# Patient Record
Sex: MALE | Race: WHITE | NOT HISPANIC OR LATINO | Employment: FULL TIME | URBAN - METROPOLITAN AREA
[De-identification: names, ages, dates, MRNs, and addresses within clinical notes are randomized per-mention and may not be internally consistent; named-entity substitution may affect disease eponyms.]

---

## 2019-10-03 ENCOUNTER — HOSPITAL ENCOUNTER (EMERGENCY)
Facility: HOSPITAL | Age: 54
Discharge: HOME | End: 2019-10-03
Attending: EMERGENCY MEDICINE
Payer: OTHER GOVERNMENT

## 2019-10-03 VITALS
DIASTOLIC BLOOD PRESSURE: 84 MMHG | OXYGEN SATURATION: 100 % | HEART RATE: 77 BPM | WEIGHT: 185 LBS | SYSTOLIC BLOOD PRESSURE: 125 MMHG | RESPIRATION RATE: 14 BRPM | TEMPERATURE: 98 F

## 2019-10-03 DIAGNOSIS — S93.401A SPRAIN OF RIGHT ANKLE, UNSPECIFIED LIGAMENT, INITIAL ENCOUNTER: Primary | ICD-10-CM

## 2019-10-03 DIAGNOSIS — M25.522 LEFT ELBOW PAIN: ICD-10-CM
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
## 2019-10-03 PROCEDURE — 99284 EMERGENCY DEPT VISIT MOD MDM: CPT | Mod: ,,, | Performed by: PHYSICIAN ASSISTANT

## 2019-10-03 PROCEDURE — 99284 PR EMERGENCY DEPT VISIT,LEVEL IV: ICD-10-PCS | Mod: ,,, | Performed by: PHYSICIAN ASSISTANT

## 2019-10-03 PROCEDURE — 99284 EMERGENCY DEPT VISIT MOD MDM: CPT | Mod: 25

## 2019-10-03 PROCEDURE — 29515 APPLICATION SHORT LEG SPLINT: CPT | Mod: RT

## 2019-10-03 RX ORDER — TELMISARTAN 80 MG/1
80 TABLET ORAL DAILY
COMMUNITY
End: 2022-01-14 | Stop reason: SDUPTHER

## 2019-10-03 RX ORDER — DEXTROAMPHETAMINE SACCHARATE, AMPHETAMINE ASPARTATE MONOHYDRATE, DEXTROAMPHETAMINE SULFATE AND AMPHETAMINE SULFATE 5; 5; 5; 5 MG/1; MG/1; MG/1; MG/1
20 CAPSULE, EXTENDED RELEASE ORAL
COMMUNITY
End: 2022-06-15 | Stop reason: SDUPTHER

## 2019-10-03 RX ORDER — AMLODIPINE BESYLATE 10 MG/1
10 TABLET ORAL DAILY
Status: ON HOLD | COMMUNITY
End: 2021-11-23 | Stop reason: HOSPADM

## 2019-10-04 NOTE — ED NOTES
Patient identifiers for Brice Byrd 54 y.o. male checked and correct.  Chief Complaint   Patient presents with    Fall     Pt with right ankle and left elbow pain s/p trip and fall yesterday.  Pt denies hitting head, no LOC     No past medical history on file.  Allergies reported: Review of patient's allergies indicates:  No Known Allergies      LOC: Patient is awake, alert, and aware of environment with an appropriate affect. Patient is oriented x 4 and speaking appropriately.  APPEARANCE: Patient resting comfortably and in no acute distress. Patient is clean and well groomed, patient's clothing is properly fastened.  SKIN: The skin is warm and dry. Patient has normal skin turgor and moist mucus membranes. Skin abrasion to left elbow.   MUSKULOSKELETAL: Patient is moving all extremities well, no obvious deformities noted. Pulses intact. Patient reports stepping wrong and falling yesterday after twisting his right ankle; states he heard a snap. Reports pain 8/10. Swelling noted to ankle and bruising to sides of foot. Patient also hit his left elbow when he fell; reports tenderness. Also reports tenderness to left hip and ankle as well.   RESPIRATORY: Airway is open and patent. Respirations are spontaneous and non-labored with normal effort and rate. Denies SOB.  CARDIAC: Patient has a normal rate and rhythm. No peripheral edema noted. Denies chest pain.  ABDOMEN: No distention noted.  Soft and non-tender upon palpation. Denies nausea and vomiting.  NEUROLOGICAL: PERRL. Facial expression is symmetrical. Hand grasps are equal bilaterally. Normal sensation in all extremities when touched with finger. Denies headache, feeling light-headed, dizzy. Denies hitting his head with fall.

## 2019-10-04 NOTE — DISCHARGE INSTRUCTIONS
Use Ice for ankle and elbow.  Weight-bearing as tolerated with splint to right ankle.  Follow-up with primary provider as needed for repeat films if pain continues.  Keep ankle elevated.  Do not run until ankles completely healed.  Return to the emergency department immediately if he develops significant swelling, fever or new numbness.    Our goal in the emergency department is to always give you outstanding care and exceptional service. You may receive a survey by mail or e-mail in the next week regarding your experience in our ED. We would greatly appreciate your completing and returning the survey. Your feedback provides us with a way to recognize our staff who give very good care and it helps us learn how to improve when your experience was below our aspiration of excellence.

## 2019-10-04 NOTE — ED PROVIDER NOTES
Encounter Date: 10/3/2019       History     Chief Complaint   Patient presents with    Fall     Pt with right ankle and left elbow pain s/p trip and fall yesterday.  Pt denies hitting head, no LOC     Mr Byrd is a 54yoM who presents for right ankle left elbow pain after fall; pertinent PMHx HTN.  Patient was running yesterday and tripped, causing forced inversion injury to right ankle.  Also landed on left elbow.  Endorses worsening pain to right ankle and left elbow today.  He is able to ambulate though limps due to pain. He Is taking Tylenol for his pain. Denies history of right ankle surgery or prior injury. He did not injure his head or neck.  Denies distal numbness or weakness, other musculoskeletal injury, fever, nausea or vomiting.  The patients available PMH, PSH, Social History, medications, allergies, and triage vital signs were reviewed just prior to their medical evaluation.    Please be advised this text was dictated with Twinklr software and may contain errors due to translation.           Review of patient's allergies indicates:  No Known Allergies  Past Medical History:   Diagnosis Date    Hypertension      History reviewed. No pertinent surgical history.  History reviewed. No pertinent family history.  Social History     Tobacco Use    Smoking status: Former Smoker    Smokeless tobacco: Never Used   Substance Use Topics    Alcohol use: Never     Frequency: Never    Drug use: Never     Review of Systems   Constitutional: Negative for chills and fever.   Gastrointestinal: Negative for nausea and vomiting.   Musculoskeletal: Positive for arthralgias, gait problem and joint swelling. Negative for neck pain.   Skin: Negative for pallor and rash.   Neurological: Negative for weakness and numbness.       Physical Exam     Initial Vitals [10/03/19 1824]   BP Pulse Resp Temp SpO2   (!) 142/93 92 16 97.9 °F (36.6 °C) 98 %      MAP       --         Physical Exam    Vitals reviewed.  Constitutional: He  appears well-developed and well-nourished. He is not diaphoretic. No distress.   HENT:   Head: Normocephalic and atraumatic.   Eyes: Conjunctivae and EOM are normal. Pupils are equal, round, and reactive to light.   Cardiovascular: Normal rate, regular rhythm and intact distal pulses.   Pulmonary/Chest: Breath sounds normal.   Musculoskeletal: He exhibits edema and tenderness.   Right ankle:  TTP anterior aspects bilateral malleoli, 4th metatarsal.  Mild edema with ecchymosis lateral malleoli.  No tenderness to other aspects of ankle and foot exam nor proximal fibula.  DP intact, good cap refill.  FROM of ankle limited secondary to pain, though able to perform all motions to an extent.  No sensory deficit    Left elbow.  Mild swelling of bursa with minimal localized erythema.  Tender only to olecranon.  Remainder of elbow exam is unremarkable, F ROM.  Radial pulses strong with good cap refill.  No tenderness to bursa.   Neurological: He is alert and oriented to person, place, and time. No cranial nerve deficit or sensory deficit.   Skin: Skin is warm and dry. Capillary refill takes less than 2 seconds. No rash noted. No erythema. No pallor.   Psychiatric: He has a normal mood and affect. His behavior is normal. Judgment and thought content normal.         ED Course   Procedures  Labs Reviewed - No data to display       Imaging Results          X-Ray Foot Complete Right (Final result)  Result time 10/03/19 20:33:54    Final result by Isaias Sarkar MD (10/03/19 20:33:54)                 Impression:      No acute bony abnormality.      Electronically signed by: Isaias Sarkar MD  Date:    10/03/2019  Time:    20:33             Narrative:    EXAMINATION:  XR FOOT COMPLETE 3 VIEW RIGHT    CLINICAL HISTORY:  Unspecified fall, initial encounter.    TECHNIQUE:  AP, lateral, and oblique views of the right foot were performed.    COMPARISON:  None.    FINDINGS:  No evidence of acute fracture or dislocation.  Small  calcified focus noted at the lateral aspect of the midfoot, likely chronic.  Soft tissues are symmetric.  No radiopaque foreign body.                               X-Ray Elbow Complete Left (Final result)  Result time 10/03/19 20:35:21    Final result by Isaias Sarkar MD (10/03/19 20:35:21)                 Impression:      No acute bony abnormality.      Electronically signed by: Isaias Sarkar MD  Date:    10/03/2019  Time:    20:35             Narrative:    EXAMINATION:  XR ELBOW COMPLETE 3 VIEW LEFT    CLINICAL HISTORY:  Unspecified fall, initial encounter    TECHNIQUE:  AP, lateral, and oblique views of the left elbow were performed.    COMPARISON:  None    FINDINGS:  Suboptimal positioning on the oblique radiograph somewhat limits evaluation.  No displaced fracture is identified.  No large joint effusion.  There is mild soft tissue prominence posteriorly.  Small calcifications at the olecranon may reflect enthesopathic changes.  No radiopaque foreign body.                               X-Ray Ankle Complete Right (Final result)  Result time 10/03/19 20:26:23    Final result by Michael Miranda MD (10/03/19 20:26:23)                 Impression:      1. No acute displaced fracture or dislocation of the ankle.      Electronically signed by: Michael Miranda MD  Date:    10/03/2019  Time:    20:26             Narrative:    EXAMINATION:  XR ANKLE COMPLETE 3 VIEW RIGHT    CLINICAL HISTORY:  Unspecified fall, initial encounter    TECHNIQUE:  AP, lateral, and oblique images of the right ankle were performed.    COMPARISON:  None    FINDINGS:  Three views.    There is edema about the ankle.  The ankle mortise is intact.  No acute displaced fracture or dislocation of the ankle.  No radiopaque foreign body.                                 Medical Decision Making:   History:   Old Medical Records: I decided to obtain old medical records.  Old Records Summarized: records from clinic visits and records from previous  admission(s).  Initial Assessment:   Patient presents for forced inversion injury R ankle and blunt fall injury L elbow s/p fall yesterday. Extremities neurovascularly intact, see exam for MSK detail, VSS, afebrile  Differential Diagnosis:   DDx includes ankle sprain, mild bursitis of elbow, bony fracture. Physical exam and history taking lower clinical suspicion for  Joint dislocation, significant vascular or neurologic injury, high ankle sprain, septic bursitis, septic arthritis.   Clinical Tests:   Radiological Study: Ordered and Reviewed  ED Management:  Plain films unremarkable for bony fracture or acute findings. Exam consistent with mild/mod ankle Sprain, possible traumatic bursitis though I do not suspect infection at this time. Given RICE instructions with air splint. Patient agreed to plan of care and voiced understanding. Discharged in stable condition with strict ED return precautions.    .mmed  10/04/2019    I discussed the following case, diagnosis and plan of care with attending physician.                        Clinical Impression:       ICD-10-CM ICD-9-CM   1. Sprain of right ankle, unspecified ligament, initial encounter S93.401A 845.00   2. Fall W19.XXXA E888.9   3. Left elbow pain M25.522 719.42         Disposition:   Disposition: Discharged  Condition: Stable                        Paige Brenner PA-C  10/04/19 0917

## 2019-11-20 ENCOUNTER — OFFICE VISIT (OUTPATIENT)
Dept: URGENT CARE | Facility: CLINIC | Age: 54
End: 2019-11-20
Payer: OTHER GOVERNMENT

## 2019-11-20 VITALS
OXYGEN SATURATION: 98 % | SYSTOLIC BLOOD PRESSURE: 123 MMHG | HEIGHT: 69 IN | BODY MASS INDEX: 27.4 KG/M2 | DIASTOLIC BLOOD PRESSURE: 89 MMHG | HEART RATE: 70 BPM | TEMPERATURE: 97 F | WEIGHT: 185 LBS | RESPIRATION RATE: 19 BRPM

## 2019-11-20 DIAGNOSIS — K52.9 GASTROENTERITIS: Primary | ICD-10-CM

## 2019-11-20 DIAGNOSIS — R52 BODY ACHES: ICD-10-CM

## 2019-11-20 DIAGNOSIS — R19.7 DIARRHEA, UNSPECIFIED TYPE: ICD-10-CM

## 2019-11-20 LAB
CTP QC/QA: YES
FLUAV AG NPH QL: NEGATIVE
FLUBV AG NPH QL: NEGATIVE

## 2019-11-20 PROCEDURE — 87804 POCT INFLUENZA A/B: ICD-10-PCS | Mod: QW,S$GLB,, | Performed by: NURSE PRACTITIONER

## 2019-11-20 PROCEDURE — 99203 OFFICE O/P NEW LOW 30 MIN: CPT | Mod: S$GLB,,, | Performed by: NURSE PRACTITIONER

## 2019-11-20 PROCEDURE — 99203 PR OFFICE/OUTPT VISIT, NEW, LEVL III, 30-44 MIN: ICD-10-PCS | Mod: S$GLB,,, | Performed by: NURSE PRACTITIONER

## 2019-11-20 PROCEDURE — 87804 INFLUENZA ASSAY W/OPTIC: CPT | Mod: QW,S$GLB,, | Performed by: NURSE PRACTITIONER

## 2019-11-20 RX ORDER — ONDANSETRON 4 MG/1
4 TABLET, ORALLY DISINTEGRATING ORAL EVERY 6 HOURS PRN
Qty: 12 TABLET | Refills: 0 | Status: SHIPPED | OUTPATIENT
Start: 2019-11-20 | End: 2022-01-14

## 2019-11-20 RX ORDER — DICYCLOMINE HYDROCHLORIDE 20 MG/1
20 TABLET ORAL EVERY 6 HOURS PRN
Qty: 20 TABLET | Refills: 0 | Status: SHIPPED | OUTPATIENT
Start: 2019-11-20 | End: 2019-11-25

## 2019-11-20 NOTE — PATIENT INSTRUCTIONS
PLEASE READ YOUR DISCHARGE INSTRUCTIONS ENTIRELY AS IT CONTAINS IMPORTANT INFORMATION.     Take the zofran for nausea (it dissolves under your tongue) and the bentyl for stomach cramping (may cause drowsiness).      Use gatorade/pedialyte or rehydration packets to help stay hydrated. Vitamin water and plain water do not contain rehydrating electrolytes.  Increase clear liquids (water, gatorade, pedialyte, broths, jello, etc) Hold off on solids for 12-18 hours. Then advance to BRAT diet (banana, rice, applesauce, tea, toast/crackers), then advance further as tolerated. Avoid spicy or fatty foods.   Use Peptobismol to help alleviate your diarrhea symptoms.      Avoid imodium unless you have more than 6 loose stools in 24 hours.      PLEASE CALL [phone] TO SCHEDULE YOUR GI APPT        Wash hands frequently while sick. Avoid ibuprofen or other NSAIDS until you are well.      Please go to the ER if you experience worsening pain, blood in your vomit or stool, high fever, dizziness, fainting, swelling of your abdomen, inability to pass gas or stool.      You must understand that you have received an Urgent Care treatment only and that you may be released before all of your medical problems are known or treated.      [place] Diet  Your healthcare provider may recommend a bland diet if you have an upset stomach. It consists of foods that are mild and easy to digest. It is better to eat small frequent meals rather than 3 large meals a day.    Beverages  OK: Fruit juices, non-caffeinated teas and coffee, non-carbonated ellsworth  Avoid: Carbonated beverage, caffeinated tea and coffee, all alcoholic beverages  Bread  OK: Refined white, wheat or rye bread, bolivar or soda crackers, Denise toast, plain rolls, bagels  Avoid: Whole-grain bread  Cereal  OK: Refined cereals: cooked or ready to eat  Avoid: Whole-grain cereals and granola, or those containing bran, seeds or nuts  Desserts  OK: Peanut butter and all others except those to  ""avoid"  Avoid: Chocolate, cocoa, coconut, popcorn, nuts, seeds, jam, marmalade  Fruits  OK: Canned, cooked, frozen or fresh fruits without seeds or tough skin  Avoid: Olives, skin and seeds of fruit  Meats  OK: All fresh or preserved meat, fish and fowl  Avoid: Any that are prepared with those spices to "avoid"  Cheese and eggs  OK: Eggs, cottage cheese, cream cheese, other cheeses  Avoid: All cheeses made with those spices to "avoid"  Potatoes and pasta  OK: Potato, rice, macaroni, noodles, spaghetti  Avoid: None  Soups  OK: All soups without heavy seasoning  Avoid: Soups made with those spices to "avoid"  Vegetables  OK: Canned, cooked, fresh or frozen mildly flavored vegetables without seeds, skins or coarse fiber  Avoid: Vegetables prepared with those spices to "avoid"; skin and seeds of vegetables and those with coarse fiber  Spices  OK: Salt, lemon and lime juice, vinegar, all extracts, blas, cinnamon, thyme, mace, allspice, paprika  Avoid: Chili powder, cloves, pepper, seed spices, garlic, gravy pickles, highly seasoned salad dressings  Date Last Reviewed: 11/20/2015  © 5463-9283 [employer]. [address] [place] [id number]. All rights reserved. This information is not intended as a substitute for professional medical care. Always follow your healthcare professional's instructions.        "

## 2021-11-22 ENCOUNTER — HOSPITAL ENCOUNTER (OUTPATIENT)
Facility: HOSPITAL | Age: 56
Discharge: HOME | End: 2021-11-23
Attending: EMERGENCY MEDICINE | Admitting: INTERNAL MEDICINE
Payer: OTHER GOVERNMENT

## 2021-11-22 DIAGNOSIS — R55 SYNCOPE AND COLLAPSE: Primary | ICD-10-CM

## 2021-11-22 DIAGNOSIS — R74.01 TRANSAMINITIS: ICD-10-CM

## 2021-11-22 DIAGNOSIS — R07.9 CHEST PAIN: ICD-10-CM

## 2021-11-22 DIAGNOSIS — R55 SYNCOPE: ICD-10-CM

## 2021-11-22 DIAGNOSIS — R07.89 CHEST PAIN, MIDSTERNAL: ICD-10-CM

## 2021-11-22 DIAGNOSIS — R55 SYNCOPAL EPISODES: ICD-10-CM

## 2021-11-22 LAB
ALBUMIN SERPL BCP-MCNC: 3.8 G/DL (ref 3.5–5.2)
ALP SERPL-CCNC: 115 U/L (ref 55–135)
ALT SERPL W/O P-5'-P-CCNC: 171 U/L (ref 10–44)
ANION GAP SERPL CALC-SCNC: 10 MMOL/L (ref 8–16)
AST SERPL-CCNC: 80 U/L (ref 10–40)
BASOPHILS # BLD AUTO: 0.08 K/UL (ref 0–0.2)
BASOPHILS NFR BLD: 0.9 % (ref 0–1.9)
BILIRUB SERPL-MCNC: 0.3 MG/DL (ref 0.1–1)
BILIRUB UR QL STRIP: NEGATIVE
BILIRUB UR QL STRIP: NEGATIVE
BNP SERPL-MCNC: 14 PG/ML (ref 0–99)
BUN SERPL-MCNC: 15 MG/DL (ref 6–20)
CALCIUM SERPL-MCNC: 10 MG/DL (ref 8.7–10.5)
CHLORIDE SERPL-SCNC: 101 MMOL/L (ref 95–110)
CLARITY UR REFRACT.AUTO: CLEAR
CLARITY UR REFRACT.AUTO: CLEAR
CO2 SERPL-SCNC: 27 MMOL/L (ref 23–29)
COLOR UR AUTO: YELLOW
COLOR UR AUTO: YELLOW
CREAT SERPL-MCNC: 0.9 MG/DL (ref 0.5–1.4)
CTP QC/QA: YES
DIFFERENTIAL METHOD: ABNORMAL
EOSINOPHIL # BLD AUTO: 0.1 K/UL (ref 0–0.5)
EOSINOPHIL NFR BLD: 1.3 % (ref 0–8)
ERYTHROCYTE [DISTWIDTH] IN BLOOD BY AUTOMATED COUNT: 11.6 % (ref 11.5–14.5)
EST. GFR  (AFRICAN AMERICAN): >60 ML/MIN/1.73 M^2
EST. GFR  (NON AFRICAN AMERICAN): >60 ML/MIN/1.73 M^2
GLUCOSE SERPL-MCNC: 84 MG/DL (ref 70–110)
GLUCOSE UR QL STRIP: NEGATIVE
GLUCOSE UR QL STRIP: NEGATIVE
HCT VFR BLD AUTO: 41.8 % (ref 40–54)
HGB BLD-MCNC: 14.2 G/DL (ref 14–18)
HGB UR QL STRIP: NEGATIVE
HGB UR QL STRIP: NEGATIVE
IMM GRANULOCYTES # BLD AUTO: 0.06 K/UL (ref 0–0.04)
IMM GRANULOCYTES NFR BLD AUTO: 0.7 % (ref 0–0.5)
KETONES UR QL STRIP: NEGATIVE
KETONES UR QL STRIP: NEGATIVE
LEUKOCYTE ESTERASE UR QL STRIP: NEGATIVE
LEUKOCYTE ESTERASE UR QL STRIP: NEGATIVE
LIPASE SERPL-CCNC: 27 U/L (ref 4–60)
LYMPHOCYTES # BLD AUTO: 2.4 K/UL (ref 1–4.8)
LYMPHOCYTES NFR BLD: 27.2 % (ref 18–48)
MAGNESIUM SERPL-MCNC: 2 MG/DL (ref 1.6–2.6)
MCH RBC QN AUTO: 30.9 PG (ref 27–31)
MCHC RBC AUTO-ENTMCNC: 34 G/DL (ref 32–36)
MCV RBC AUTO: 91 FL (ref 82–98)
MICROSCOPIC COMMENT: NORMAL
MICROSCOPIC COMMENT: NORMAL
MONOCYTES # BLD AUTO: 0.6 K/UL (ref 0.3–1)
MONOCYTES NFR BLD: 7.1 % (ref 4–15)
NEUTROPHILS # BLD AUTO: 5.5 K/UL (ref 1.8–7.7)
NEUTROPHILS NFR BLD: 62.8 % (ref 38–73)
NITRITE UR QL STRIP: NEGATIVE
NITRITE UR QL STRIP: NEGATIVE
NRBC BLD-RTO: 0 /100 WBC
PH UR STRIP: 6 [PH] (ref 5–8)
PH UR STRIP: 6 [PH] (ref 5–8)
PLATELET # BLD AUTO: 264 K/UL (ref 150–450)
PMV BLD AUTO: 10.1 FL (ref 9.2–12.9)
POCT GLUCOSE: 101 MG/DL (ref 70–110)
POTASSIUM SERPL-SCNC: 3.7 MMOL/L (ref 3.5–5.1)
PROT SERPL-MCNC: 6.8 G/DL (ref 6–8.4)
PROT UR QL STRIP: NEGATIVE
PROT UR QL STRIP: NEGATIVE
RBC # BLD AUTO: 4.6 M/UL (ref 4.6–6.2)
RBC #/AREA URNS AUTO: 1 /HPF (ref 0–4)
RBC #/AREA URNS AUTO: 4 /HPF (ref 0–4)
SARS-COV-2 RDRP RESP QL NAA+PROBE: NEGATIVE
SODIUM SERPL-SCNC: 138 MMOL/L (ref 136–145)
SP GR UR STRIP: 1.02 (ref 1–1.03)
SP GR UR STRIP: 1.02 (ref 1–1.03)
TROPONIN I SERPL DL<=0.01 NG/ML-MCNC: 0.01 NG/ML (ref 0–0.03)
TROPONIN I SERPL DL<=0.01 NG/ML-MCNC: 0.03 NG/ML (ref 0–0.03)
URN SPEC COLLECT METH UR: NORMAL
URN SPEC COLLECT METH UR: NORMAL
WBC # BLD AUTO: 8.71 K/UL (ref 3.9–12.7)

## 2021-11-22 PROCEDURE — 99285 EMERGENCY DEPT VISIT HI MDM: CPT | Mod: 25

## 2021-11-22 PROCEDURE — 99220 PR INITIAL OBSERVATION CARE,LEVL III: CPT | Mod: ,,, | Performed by: PHYSICIAN ASSISTANT

## 2021-11-22 PROCEDURE — 25000242 PHARM REV CODE 250 ALT 637 W/ HCPCS: Performed by: EMERGENCY MEDICINE

## 2021-11-22 PROCEDURE — 93005 ELECTROCARDIOGRAM TRACING: CPT

## 2021-11-22 PROCEDURE — 93010 ELECTROCARDIOGRAM REPORT: CPT | Mod: ,,, | Performed by: INTERNAL MEDICINE

## 2021-11-22 PROCEDURE — 83880 ASSAY OF NATRIURETIC PEPTIDE: CPT | Performed by: EMERGENCY MEDICINE

## 2021-11-22 PROCEDURE — U0002 COVID-19 LAB TEST NON-CDC: HCPCS | Performed by: EMERGENCY MEDICINE

## 2021-11-22 PROCEDURE — 86803 HEPATITIS C AB TEST: CPT | Performed by: EMERGENCY MEDICINE

## 2021-11-22 PROCEDURE — 81001 URINALYSIS AUTO W/SCOPE: CPT | Performed by: EMERGENCY MEDICINE

## 2021-11-22 PROCEDURE — 83735 ASSAY OF MAGNESIUM: CPT | Performed by: EMERGENCY MEDICINE

## 2021-11-22 PROCEDURE — 93010 EKG 12-LEAD: ICD-10-PCS | Mod: ,,, | Performed by: INTERNAL MEDICINE

## 2021-11-22 PROCEDURE — 84484 ASSAY OF TROPONIN QUANT: CPT | Mod: 91 | Performed by: EMERGENCY MEDICINE

## 2021-11-22 PROCEDURE — 25000003 PHARM REV CODE 250: Performed by: EMERGENCY MEDICINE

## 2021-11-22 PROCEDURE — 25000003 PHARM REV CODE 250: Performed by: PHYSICIAN ASSISTANT

## 2021-11-22 PROCEDURE — G0378 HOSPITAL OBSERVATION PER HR: HCPCS

## 2021-11-22 PROCEDURE — 36415 COLL VENOUS BLD VENIPUNCTURE: CPT | Performed by: PHYSICIAN ASSISTANT

## 2021-11-22 PROCEDURE — 36000 PLACE NEEDLE IN VEIN: CPT

## 2021-11-22 PROCEDURE — 82962 GLUCOSE BLOOD TEST: CPT

## 2021-11-22 PROCEDURE — 83690 ASSAY OF LIPASE: CPT | Performed by: PHYSICIAN ASSISTANT

## 2021-11-22 PROCEDURE — 99285 PR EMERGENCY DEPT VISIT,LEVEL V: ICD-10-PCS | Mod: CR,CS,, | Performed by: EMERGENCY MEDICINE

## 2021-11-22 PROCEDURE — 85025 COMPLETE CBC W/AUTO DIFF WBC: CPT | Performed by: EMERGENCY MEDICINE

## 2021-11-22 PROCEDURE — 81001 URINALYSIS AUTO W/SCOPE: CPT | Mod: 91 | Performed by: PHYSICIAN ASSISTANT

## 2021-11-22 PROCEDURE — 99285 EMERGENCY DEPT VISIT HI MDM: CPT | Mod: CR,CS,, | Performed by: EMERGENCY MEDICINE

## 2021-11-22 PROCEDURE — 84484 ASSAY OF TROPONIN QUANT: CPT | Mod: 91 | Performed by: PHYSICIAN ASSISTANT

## 2021-11-22 PROCEDURE — 99220 PR INITIAL OBSERVATION CARE,LEVL III: ICD-10-PCS | Mod: ,,, | Performed by: PHYSICIAN ASSISTANT

## 2021-11-22 PROCEDURE — 87389 HIV-1 AG W/HIV-1&-2 AB AG IA: CPT | Performed by: EMERGENCY MEDICINE

## 2021-11-22 PROCEDURE — 80053 COMPREHEN METABOLIC PANEL: CPT | Performed by: EMERGENCY MEDICINE

## 2021-11-22 RX ORDER — IBUPROFEN 200 MG
16 TABLET ORAL
Status: DISCONTINUED | OUTPATIENT
Start: 2021-11-22 | End: 2021-11-23 | Stop reason: HOSPADM

## 2021-11-22 RX ORDER — POTASSIUM CHLORIDE 20 MEQ/1
20 TABLET, EXTENDED RELEASE ORAL ONCE
Status: COMPLETED | OUTPATIENT
Start: 2021-11-22 | End: 2021-11-22

## 2021-11-22 RX ORDER — IPRATROPIUM BROMIDE AND ALBUTEROL SULFATE 2.5; .5 MG/3ML; MG/3ML
3 SOLUTION RESPIRATORY (INHALATION) EVERY 4 HOURS PRN
Status: DISCONTINUED | OUTPATIENT
Start: 2021-11-22 | End: 2021-11-23 | Stop reason: HOSPADM

## 2021-11-22 RX ORDER — IBUPROFEN 200 MG
24 TABLET ORAL
Status: DISCONTINUED | OUTPATIENT
Start: 2021-11-22 | End: 2021-11-23 | Stop reason: HOSPADM

## 2021-11-22 RX ORDER — POLYETHYLENE GLYCOL 3350 17 G/17G
17 POWDER, FOR SOLUTION ORAL DAILY PRN
Status: DISCONTINUED | OUTPATIENT
Start: 2021-11-22 | End: 2021-11-23 | Stop reason: HOSPADM

## 2021-11-22 RX ORDER — ACETAMINOPHEN 325 MG/1
650 TABLET ORAL EVERY 4 HOURS PRN
Status: DISCONTINUED | OUTPATIENT
Start: 2021-11-22 | End: 2021-11-23 | Stop reason: HOSPADM

## 2021-11-22 RX ORDER — ASPIRIN 325 MG
325 TABLET ORAL
Status: COMPLETED | OUTPATIENT
Start: 2021-11-22 | End: 2021-11-22

## 2021-11-22 RX ORDER — GLUCAGON 1 MG
1 KIT INJECTION
Status: DISCONTINUED | OUTPATIENT
Start: 2021-11-22 | End: 2021-11-23 | Stop reason: HOSPADM

## 2021-11-22 RX ORDER — BISACODYL 10 MG
10 SUPPOSITORY, RECTAL RECTAL DAILY PRN
Status: DISCONTINUED | OUTPATIENT
Start: 2021-11-22 | End: 2021-11-23 | Stop reason: HOSPADM

## 2021-11-22 RX ORDER — NITROGLYCERIN 0.4 MG/1
0.4 TABLET SUBLINGUAL EVERY 5 MIN PRN
Status: DISCONTINUED | OUTPATIENT
Start: 2021-11-22 | End: 2021-11-23 | Stop reason: HOSPADM

## 2021-11-22 RX ORDER — TALC
6 POWDER (GRAM) TOPICAL NIGHTLY PRN
Status: DISCONTINUED | OUTPATIENT
Start: 2021-11-22 | End: 2021-11-23 | Stop reason: HOSPADM

## 2021-11-22 RX ADMIN — ASPIRIN 325 MG ORAL TABLET 325 MG: 325 PILL ORAL at 04:11

## 2021-11-22 RX ADMIN — NITROGLYCERIN 0.4 MG: 0.4 TABLET, ORALLY DISINTEGRATING SUBLINGUAL at 08:11

## 2021-11-22 RX ADMIN — POTASSIUM CHLORIDE 20 MEQ: 1500 TABLET, EXTENDED RELEASE ORAL at 08:11

## 2021-11-23 ENCOUNTER — CLINICAL SUPPORT (OUTPATIENT)
Dept: CARDIOLOGY | Facility: HOSPITAL | Age: 56
End: 2021-11-23
Attending: PHYSICIAN ASSISTANT
Payer: OTHER GOVERNMENT

## 2021-11-23 VITALS
RESPIRATION RATE: 18 BRPM | TEMPERATURE: 98 F | WEIGHT: 180 LBS | HEIGHT: 69 IN | BODY MASS INDEX: 26.66 KG/M2 | SYSTOLIC BLOOD PRESSURE: 110 MMHG | DIASTOLIC BLOOD PRESSURE: 72 MMHG | OXYGEN SATURATION: 95 % | HEART RATE: 61 BPM

## 2021-11-23 DIAGNOSIS — R55 SYNCOPE AND COLLAPSE: ICD-10-CM

## 2021-11-23 LAB
ALBUMIN SERPL BCP-MCNC: 3.3 G/DL (ref 3.5–5.2)
ALP SERPL-CCNC: 110 U/L (ref 55–135)
ALT SERPL W/O P-5'-P-CCNC: 133 U/L (ref 10–44)
ANION GAP SERPL CALC-SCNC: 6 MMOL/L (ref 8–16)
ASCENDING AORTA: 2.78 CM
AST SERPL-CCNC: 65 U/L (ref 10–40)
AV INDEX (PROSTH): 0.91
AV MEAN GRADIENT: 3 MMHG
AV PEAK GRADIENT: 7 MMHG
AV VALVE AREA: 2.81 CM2
AV VELOCITY RATIO: 0.9
BASOPHILS # BLD AUTO: 0.11 K/UL (ref 0–0.2)
BASOPHILS NFR BLD: 1.5 % (ref 0–1.9)
BILIRUB SERPL-MCNC: 0.2 MG/DL (ref 0.1–1)
BSA FOR ECHO PROCEDURE: 1.99 M2
BUN SERPL-MCNC: 18 MG/DL (ref 6–20)
CALCIUM SERPL-MCNC: 9.3 MG/DL (ref 8.7–10.5)
CHLORIDE SERPL-SCNC: 103 MMOL/L (ref 95–110)
CHOLEST SERPL-MCNC: 232 MG/DL (ref 120–199)
CHOLEST/HDLC SERPL: 6.3 {RATIO} (ref 2–5)
CO2 SERPL-SCNC: 30 MMOL/L (ref 23–29)
CREAT SERPL-MCNC: 0.9 MG/DL (ref 0.5–1.4)
CV ECHO LV RWT: 0.33 CM
DIFFERENTIAL METHOD: ABNORMAL
DOP CALC AO PEAK VEL: 1.28 M/S
DOP CALC AO VTI: 26.95 CM
DOP CALC LVOT AREA: 3.1 CM2
DOP CALC LVOT DIAMETER: 1.99 CM
DOP CALC LVOT PEAK VEL: 1.15 M/S
DOP CALC LVOT STROKE VOLUME: 75.82 CM3
DOP CALCLVOT PEAK VEL VTI: 24.39 CM
E WAVE DECELERATION TIME: 215.02 MSEC
E/A RATIO: 0.92
E/E' RATIO: 5.55 M/S
ECHO LV POSTERIOR WALL: 0.7 CM (ref 0.6–1.1)
EJECTION FRACTION: 60 %
EOSINOPHIL # BLD AUTO: 0.3 K/UL (ref 0–0.5)
EOSINOPHIL NFR BLD: 3.7 % (ref 0–8)
ERYTHROCYTE [DISTWIDTH] IN BLOOD BY AUTOMATED COUNT: 11.8 % (ref 11.5–14.5)
EST. GFR  (AFRICAN AMERICAN): >60 ML/MIN/1.73 M^2
EST. GFR  (NON AFRICAN AMERICAN): >60 ML/MIN/1.73 M^2
ESTIMATED AVG GLUCOSE: 108 MG/DL (ref 68–131)
FRACTIONAL SHORTENING: 36 % (ref 28–44)
GLUCOSE SERPL-MCNC: 98 MG/DL (ref 70–110)
HBA1C MFR BLD: 5.4 % (ref 4–5.6)
HCT VFR BLD AUTO: 37.9 % (ref 40–54)
HCV AB SERPL QL IA: NEGATIVE
HDLC SERPL-MCNC: 37 MG/DL (ref 40–75)
HDLC SERPL: 15.9 % (ref 20–50)
HGB BLD-MCNC: 13.1 G/DL (ref 14–18)
HIV 1+2 AB+HIV1 P24 AG SERPL QL IA: NEGATIVE
IMM GRANULOCYTES # BLD AUTO: 0.06 K/UL (ref 0–0.04)
IMM GRANULOCYTES NFR BLD AUTO: 0.8 % (ref 0–0.5)
INR PPP: 0.9 (ref 0.8–1.2)
INTERVENTRICULAR SEPTUM: 0.92 CM (ref 0.6–1.1)
IVRT: 105.61 MSEC
LA MAJOR: 5.48 CM
LA MINOR: 5.49 CM
LA WIDTH: 4.06 CM
LDLC SERPL CALC-MCNC: ABNORMAL MG/DL (ref 63–159)
LEFT ATRIUM SIZE: 3.5 CM
LEFT ATRIUM VOLUME INDEX MOD: 26.4 ML/M2
LEFT ATRIUM VOLUME INDEX: 33.5 ML/M2
LEFT ATRIUM VOLUME MOD: 52.19 CM3
LEFT ATRIUM VOLUME: 66.25 CM3
LEFT INTERNAL DIMENSION IN SYSTOLE: 2.71 CM (ref 2.1–4)
LEFT VENTRICLE DIASTOLIC VOLUME INDEX: 39.85 ML/M2
LEFT VENTRICLE DIASTOLIC VOLUME: 78.9 ML
LEFT VENTRICLE MASS INDEX: 52 G/M2
LEFT VENTRICLE SYSTOLIC VOLUME INDEX: 13.8 ML/M2
LEFT VENTRICLE SYSTOLIC VOLUME: 27.39 ML
LEFT VENTRICULAR INTERNAL DIMENSION IN DIASTOLE: 4.21 CM (ref 3.5–6)
LEFT VENTRICULAR MASS: 103.38 G
LV LATERAL E/E' RATIO: 4.69 M/S
LV SEPTAL E/E' RATIO: 6.78 M/S
LYMPHOCYTES # BLD AUTO: 2.7 K/UL (ref 1–4.8)
LYMPHOCYTES NFR BLD: 36.4 % (ref 18–48)
MCH RBC QN AUTO: 31.5 PG (ref 27–31)
MCHC RBC AUTO-ENTMCNC: 34.6 G/DL (ref 32–36)
MCV RBC AUTO: 91 FL (ref 82–98)
MONOCYTES # BLD AUTO: 0.6 K/UL (ref 0.3–1)
MONOCYTES NFR BLD: 8 % (ref 4–15)
MV A" WAVE DURATION": 11.42 MSEC
MV PEAK A VEL: 0.66 M/S
MV PEAK E VEL: 0.61 M/S
MV STENOSIS PRESSURE HALF TIME: 62.36 MS
MV VALVE AREA P 1/2 METHOD: 3.53 CM2
NEUTROPHILS # BLD AUTO: 3.7 K/UL (ref 1.8–7.7)
NEUTROPHILS NFR BLD: 49.6 % (ref 38–73)
NONHDLC SERPL-MCNC: 195 MG/DL
NRBC BLD-RTO: 0 /100 WBC
PISA TR MAX VEL: 2.14 M/S
PLATELET # BLD AUTO: 236 K/UL (ref 150–450)
PMV BLD AUTO: 9.8 FL (ref 9.2–12.9)
POTASSIUM SERPL-SCNC: 3.8 MMOL/L (ref 3.5–5.1)
PROT SERPL-MCNC: 5.8 G/DL (ref 6–8.4)
PROTHROMBIN TIME: 9.9 SEC (ref 9–12.5)
PULM VEIN S/D RATIO: 2.5
PV PEAK D VEL: 0.22 M/S
PV PEAK S VEL: 0.55 M/S
RA MAJOR: 5.14 CM
RA PRESSURE: 3 MMHG
RA WIDTH: 3.65 CM
RBC # BLD AUTO: 4.16 M/UL (ref 4.6–6.2)
RIGHT VENTRICULAR END-DIASTOLIC DIMENSION: 3.25 CM
RV TISSUE DOPPLER FREE WALL SYSTOLIC VELOCITY 1 (APICAL 4 CHAMBER VIEW): 12.92 CM/S
SINUS: 3.08 CM
SODIUM SERPL-SCNC: 139 MMOL/L (ref 136–145)
STJ: 2.53 CM
TDI LATERAL: 0.13 M/S
TDI SEPTAL: 0.09 M/S
TDI: 0.11 M/S
TR MAX PG: 18 MMHG
TRICUSPID ANNULAR PLANE SYSTOLIC EXCURSION: 2 CM
TRIGL SERPL-MCNC: 516 MG/DL (ref 30–150)
TROPONIN I SERPL DL<=0.01 NG/ML-MCNC: 0.01 NG/ML (ref 0–0.03)
TSH SERPL DL<=0.005 MIU/L-ACNC: 2.42 UIU/ML (ref 0.4–4)
TV REST PULMONARY ARTERY PRESSURE: 21 MMHG
WBC # BLD AUTO: 7.39 K/UL (ref 3.9–12.7)

## 2021-11-23 PROCEDURE — 85025 COMPLETE CBC W/AUTO DIFF WBC: CPT | Performed by: PHYSICIAN ASSISTANT

## 2021-11-23 PROCEDURE — 99217 PR OBSERVATION CARE DISCHARGE: ICD-10-PCS | Mod: ,,, | Performed by: PHYSICIAN ASSISTANT

## 2021-11-23 PROCEDURE — 63600175 PHARM REV CODE 636 W HCPCS: Performed by: PHYSICIAN ASSISTANT

## 2021-11-23 PROCEDURE — 93270 REMOTE 30 DAY ECG REV/REPORT: CPT

## 2021-11-23 PROCEDURE — 99217 PR OBSERVATION CARE DISCHARGE: CPT | Mod: ,,, | Performed by: PHYSICIAN ASSISTANT

## 2021-11-23 PROCEDURE — 93272 ECG/REVIEW INTERPRET ONLY: CPT | Mod: ,,, | Performed by: INTERNAL MEDICINE

## 2021-11-23 PROCEDURE — 93272 CARDIAC EVENT MONITOR (CUPID ONLY): ICD-10-PCS | Mod: ,,, | Performed by: INTERNAL MEDICINE

## 2021-11-23 PROCEDURE — 84443 ASSAY THYROID STIM HORMONE: CPT | Performed by: PHYSICIAN ASSISTANT

## 2021-11-23 PROCEDURE — 80053 COMPREHEN METABOLIC PANEL: CPT | Performed by: PHYSICIAN ASSISTANT

## 2021-11-23 PROCEDURE — 36415 COLL VENOUS BLD VENIPUNCTURE: CPT | Performed by: PHYSICIAN ASSISTANT

## 2021-11-23 PROCEDURE — 85610 PROTHROMBIN TIME: CPT | Performed by: PHYSICIAN ASSISTANT

## 2021-11-23 PROCEDURE — G0378 HOSPITAL OBSERVATION PER HR: HCPCS

## 2021-11-23 PROCEDURE — 83036 HEMOGLOBIN GLYCOSYLATED A1C: CPT | Performed by: PHYSICIAN ASSISTANT

## 2021-11-23 PROCEDURE — 80061 LIPID PANEL: CPT | Performed by: PHYSICIAN ASSISTANT

## 2021-11-23 RX ADMIN — SODIUM CHLORIDE, SODIUM LACTATE, POTASSIUM CHLORIDE, AND CALCIUM CHLORIDE 1000 ML: .6; .31; .03; .02 INJECTION, SOLUTION INTRAVENOUS at 01:11

## 2021-12-10 ENCOUNTER — OFFICE VISIT (OUTPATIENT)
Dept: CARDIOLOGY | Facility: CLINIC | Age: 56
End: 2021-12-10
Payer: OTHER GOVERNMENT

## 2021-12-10 ENCOUNTER — TELEPHONE (OUTPATIENT)
Dept: CARDIOLOGY | Facility: CLINIC | Age: 56
End: 2021-12-10
Payer: OTHER GOVERNMENT

## 2021-12-10 VITALS
BODY MASS INDEX: 28.54 KG/M2 | DIASTOLIC BLOOD PRESSURE: 74 MMHG | SYSTOLIC BLOOD PRESSURE: 156 MMHG | HEART RATE: 87 BPM | WEIGHT: 192.69 LBS | HEIGHT: 69 IN

## 2021-12-10 DIAGNOSIS — I47.10 SVT (SUPRAVENTRICULAR TACHYCARDIA): Primary | ICD-10-CM

## 2021-12-10 DIAGNOSIS — I10 HYPERTENSION, UNSPECIFIED TYPE: ICD-10-CM

## 2021-12-10 DIAGNOSIS — R55 SYNCOPE AND COLLAPSE: ICD-10-CM

## 2021-12-10 PROCEDURE — 99203 PR OFFICE/OUTPT VISIT, NEW, LEVL III, 30-44 MIN: ICD-10-PCS | Mod: S$PBB,,, | Performed by: INTERNAL MEDICINE

## 2021-12-10 PROCEDURE — 99214 OFFICE O/P EST MOD 30 MIN: CPT | Mod: PBBFAC | Performed by: INTERNAL MEDICINE

## 2021-12-10 PROCEDURE — 99203 OFFICE O/P NEW LOW 30 MIN: CPT | Mod: S$PBB,,, | Performed by: INTERNAL MEDICINE

## 2021-12-10 PROCEDURE — 99999 PR PBB SHADOW E&M-EST. PATIENT-LVL IV: ICD-10-PCS | Mod: PBBFAC,,, | Performed by: INTERNAL MEDICINE

## 2021-12-10 PROCEDURE — 99999 PR PBB SHADOW E&M-EST. PATIENT-LVL IV: CPT | Mod: PBBFAC,,, | Performed by: INTERNAL MEDICINE

## 2021-12-10 RX ORDER — AMLODIPINE BESYLATE 5 MG/1
5 TABLET ORAL DAILY
COMMUNITY
End: 2022-01-14

## 2021-12-30 RX ORDER — CHLORTHALIDONE 25 MG/1
12.5 TABLET ORAL DAILY
COMMUNITY
Start: 2021-10-25 | End: 2022-01-14 | Stop reason: SDUPTHER

## 2021-12-30 RX ORDER — DEXTROAMPHETAMINE SACCHARATE, AMPHETAMINE ASPARTATE MONOHYDRATE, DEXTROAMPHETAMINE SULFATE AND AMPHETAMINE SULFATE 2.5; 2.5; 2.5; 2.5 MG/1; MG/1; MG/1; MG/1
CAPSULE, EXTENDED RELEASE ORAL EVERY MORNING
COMMUNITY
Start: 2021-10-19 | End: 2022-01-14

## 2022-01-06 ENCOUNTER — HOSPITAL ENCOUNTER (OUTPATIENT)
Dept: CARDIOLOGY | Facility: CLINIC | Age: 57
Discharge: HOME | End: 2022-01-06
Payer: OTHER GOVERNMENT

## 2022-01-06 ENCOUNTER — OFFICE VISIT (OUTPATIENT)
Dept: ELECTROPHYSIOLOGY | Facility: CLINIC | Age: 57
End: 2022-01-06
Payer: OTHER GOVERNMENT

## 2022-01-06 VITALS
HEART RATE: 90 BPM | WEIGHT: 190.69 LBS | SYSTOLIC BLOOD PRESSURE: 130 MMHG | HEIGHT: 69 IN | DIASTOLIC BLOOD PRESSURE: 80 MMHG | BODY MASS INDEX: 28.24 KG/M2

## 2022-01-06 DIAGNOSIS — R55 SYNCOPE AND COLLAPSE: Primary | ICD-10-CM

## 2022-01-06 DIAGNOSIS — I47.10 SVT (SUPRAVENTRICULAR TACHYCARDIA): ICD-10-CM

## 2022-01-06 PROCEDURE — 99999 PR PBB SHADOW E&M-EST. PATIENT-LVL IV: CPT | Mod: PBBFAC,,, | Performed by: INTERNAL MEDICINE

## 2022-01-06 PROCEDURE — 93010 ELECTROCARDIOGRAM REPORT: CPT | Mod: S$PBB,,, | Performed by: INTERNAL MEDICINE

## 2022-01-06 PROCEDURE — 93010 RHYTHM STRIP: ICD-10-PCS | Mod: S$PBB,,, | Performed by: INTERNAL MEDICINE

## 2022-01-06 PROCEDURE — 99205 PR OFFICE/OUTPT VISIT, NEW, LEVL V, 60-74 MIN: ICD-10-PCS | Mod: S$PBB,,, | Performed by: INTERNAL MEDICINE

## 2022-01-06 PROCEDURE — 93005 ELECTROCARDIOGRAM TRACING: CPT | Mod: PBBFAC | Performed by: INTERNAL MEDICINE

## 2022-01-06 PROCEDURE — 99999 PR PBB SHADOW E&M-EST. PATIENT-LVL IV: ICD-10-PCS | Mod: PBBFAC,,, | Performed by: INTERNAL MEDICINE

## 2022-01-06 PROCEDURE — 99205 OFFICE O/P NEW HI 60 MIN: CPT | Mod: S$PBB,,, | Performed by: INTERNAL MEDICINE

## 2022-01-06 PROCEDURE — 99214 OFFICE O/P EST MOD 30 MIN: CPT | Mod: PBBFAC | Performed by: INTERNAL MEDICINE

## 2022-01-06 NOTE — PROGRESS NOTES
Subjective:    Patient ID:  Brice Byrd is a 56 y.o. male who presents for evaluation of SVT and Loss of Consciousness      56 yoM here for SVT management. He has history of adenosine sensitive SVT with rates in the 180s/190s. Recently he had a syncopal episode that is consistent with a vagal spell. He had a prodrome with dizziness. He got to a chair and had LOC about 30s according to family. No prior or subsequent events. Normal EF on echo and SVT noted on event monitor. He takes adderall.     Echo 11/21:  · The left ventricle is normal in size with normal systolic function. The estimated ejection fraction is 60%.  · Normal right ventricular size with normal right ventricular systolic function.  · Normal left ventricular diastolic function.  · The estimated PA systolic pressure is 21 mmHg.  · Normal central venous pressure (3 mmHg).    Event monitor 12/21:  · Sinus rhythm corresponds to symptoms of light-headedness.  · Sinus rhythm with a run of a nonsustained SVT, likely an atrial tachycardia, corresponds to symptoms of heart racing.    Past Medical History:  No date: Hypertension  No date: SVT (supraventricular tachycardia)    Past Surgical History:  No date: VASECTOMY    Social History    Socioeconomic History      Marital status:     Tobacco Use      Smoking status: Former Smoker      Smokeless tobacco: Never Used    Substance and Sexual Activity      Alcohol use: Never      Drug use: Never      Sexual activity: Yes        Partners: Female      No family history on file.      Current Outpatient Medications:  amLODIPine (NORVASC) 5 MG tablet, Take 5 mg by mouth once daily., Disp: , Rfl:   chlorthalidone (HYGROTEN) 25 MG Tab, Take 25 mg by mouth once daily., Disp: , Rfl:   dextroamphetamine-amphetamine (ADDERALL XR) 10 MG 24 hr capsule, Take by mouth every morning., Disp: , Rfl:   dextroamphetamine-amphetamine (ADDERALL XR) 20 MG 24 hr capsule, Take 20 mg by mouth as needed., Disp: , Rfl:    ondansetron (ZOFRAN-ODT) 4 MG TbDL, Take 1 tablet (4 mg total) by mouth every 6 (six) hours as needed. (Patient not taking: Reported on 12/10/2021), Disp: 12 tablet, Rfl: 0  telmisartan (MICARDIS) 80 MG Tab, Take 80 mg by mouth once daily., Disp: , Rfl:     No current facility-administered medications for this visit.          Review of Systems   Constitutional: Negative.   HENT: Negative.    Eyes: Negative.    Cardiovascular: Positive for syncope. Negative for chest pain, dyspnea on exertion, leg swelling, near-syncope and palpitations.   Respiratory: Negative.  Negative for shortness of breath.    Endocrine: Negative.    Hematologic/Lymphatic: Negative.    Skin: Negative.    Musculoskeletal: Negative.    Gastrointestinal: Negative.    Genitourinary: Negative.    Neurological: Negative for dizziness and light-headedness.   Psychiatric/Behavioral: Negative.    Allergic/Immunologic: Negative.         Objective:    Physical Exam  Vitals reviewed.   Constitutional:       General: He is not in acute distress.     Appearance: He is well-developed and well-nourished.   HENT:      Head: Normocephalic and atraumatic.   Eyes:      Extraocular Movements: EOM normal.      Pupils: Pupils are equal, round, and reactive to light.   Neck:      Thyroid: No thyromegaly.      Vascular: No JVD.   Cardiovascular:      Rate and Rhythm: Normal rate and regular rhythm.      Chest Wall: PMI is not displaced.      Heart sounds: Normal heart sounds, S1 normal and S2 normal. No murmur heard.  No friction rub. No gallop.    Pulmonary:      Effort: Pulmonary effort is normal. No respiratory distress.      Breath sounds: Normal breath sounds. No wheezing or rales.   Abdominal:      General: Bowel sounds are normal. There is no distension.      Palpations: Abdomen is soft.      Tenderness: There is no abdominal tenderness. There is no guarding or rebound.   Musculoskeletal:         General: No tenderness or edema. Normal range of motion.       Cervical back: Normal range of motion.   Skin:     General: Skin is warm and dry.      Findings: No erythema or rash.   Neurological:      Mental Status: He is alert and oriented to person, place, and time.      Cranial Nerves: No cranial nerve deficit.   Psychiatric:         Mood and Affect: Mood and affect normal.         Behavior: Behavior normal.         Thought Content: Thought content normal.         Judgment: Judgment normal.     ECG: NSR nl GA, QRS, QTC      Assessment:       1. Syncope and collapse    2. SVT (supraventricular tachycardia)         Plan:       56 yoM here for SVT management. He has recurrent, adenosine sensitive SVT. He also had a recent syncopal spell. I suspect vasovagal episode for his syncope. I recommend EPS/RFA for definitive therapy. He wishes to consider his options. RTC 3m

## 2022-01-14 ENCOUNTER — OFFICE VISIT (OUTPATIENT)
Dept: CARDIOLOGY | Facility: CLINIC | Age: 57
End: 2022-01-14
Payer: OTHER GOVERNMENT

## 2022-01-14 VITALS
SYSTOLIC BLOOD PRESSURE: 127 MMHG | HEIGHT: 69 IN | WEIGHT: 188.5 LBS | DIASTOLIC BLOOD PRESSURE: 79 MMHG | BODY MASS INDEX: 27.92 KG/M2 | HEART RATE: 85 BPM

## 2022-01-14 DIAGNOSIS — E78.2 MIXED HYPERLIPIDEMIA: ICD-10-CM

## 2022-01-14 DIAGNOSIS — E78.5 HYPERLIPIDEMIA, UNSPECIFIED HYPERLIPIDEMIA TYPE: Primary | ICD-10-CM

## 2022-01-14 DIAGNOSIS — I10 PRIMARY HYPERTENSION: ICD-10-CM

## 2022-01-14 DIAGNOSIS — I47.10 SVT (SUPRAVENTRICULAR TACHYCARDIA): ICD-10-CM

## 2022-01-14 PROCEDURE — 99213 PR OFFICE/OUTPT VISIT, EST, LEVL III, 20-29 MIN: ICD-10-PCS | Mod: S$PBB,,, | Performed by: INTERNAL MEDICINE

## 2022-01-14 PROCEDURE — 99213 OFFICE O/P EST LOW 20 MIN: CPT | Mod: S$PBB,,, | Performed by: INTERNAL MEDICINE

## 2022-01-14 PROCEDURE — 99213 OFFICE O/P EST LOW 20 MIN: CPT | Mod: PBBFAC | Performed by: INTERNAL MEDICINE

## 2022-01-14 PROCEDURE — 99999 PR PBB SHADOW E&M-EST. PATIENT-LVL III: CPT | Mod: PBBFAC,,, | Performed by: INTERNAL MEDICINE

## 2022-01-14 PROCEDURE — 99999 PR PBB SHADOW E&M-EST. PATIENT-LVL III: ICD-10-PCS | Mod: PBBFAC,,, | Performed by: INTERNAL MEDICINE

## 2022-01-14 RX ORDER — CHLORTHALIDONE 25 MG/1
12.5 TABLET ORAL DAILY
Qty: 15 TABLET | Refills: 3 | Status: SHIPPED | OUTPATIENT
Start: 2022-01-14 | End: 2022-06-15 | Stop reason: SDUPTHER

## 2022-01-14 RX ORDER — TELMISARTAN 80 MG/1
80 TABLET ORAL DAILY
Qty: 90 TABLET | Refills: 3 | Status: SHIPPED | OUTPATIENT
Start: 2022-01-14 | End: 2022-06-15 | Stop reason: SDUPTHER

## 2022-01-14 RX ORDER — METOPROLOL TARTRATE 25 MG/1
25 TABLET, FILM COATED ORAL 2 TIMES DAILY PRN
Qty: 60 TABLET | Refills: 11 | Status: ON HOLD | OUTPATIENT
Start: 2022-01-14 | End: 2022-02-18 | Stop reason: HOSPADM

## 2022-01-14 RX ORDER — SIMVASTATIN 5 MG/1
5 TABLET, FILM COATED ORAL NIGHTLY
Qty: 90 TABLET | Refills: 3 | Status: SHIPPED | OUTPATIENT
Start: 2022-01-14 | End: 2022-03-31

## 2022-01-14 NOTE — PROGRESS NOTES
PCP - Andrade Avalos DO  Referring Physician:     Subjective:   Patient ID:  Brice Byrd is a 56 y.o. y.o. male who presents for follow up on his atrial tachycardia.  Patient had a cardiac event monitor showing sinus rhythm with 1 episode of nonsustained SVT reaching a heart rate of 150s.  Rhythm likely due to an atrial tachycardia.  He did see EP (Dr. Nash) recently who recommended a EP study with possible ablation.  At that time patient stated that he wanted to think about it however is now amenable to having the procedure.  He has not had any episodes of lightheadedness, dizziness, or syncope since his episode on his cardiac event monitor which was noted to be on December 27th 2021 at 3:00 p.m..  He did exercise yesterday whereby before run and was asymptomatic.  He denies any chest pain, tightness, pressure, or dyspnea at rest as well as on exertion.  He is compliant with his medications and is asking for refill.    Previous clinic note on 12/10/2021:  Brice Byrd is a 56 y.o. y.o. male with a history significant for hypertension, and SVT dating back to 2015 who presents to the clinic for evaluation of SVT with recent hospitalization for syncope.  In 2015 patient first reported symptoms of nausea, and lightheadedness and near-syncope.  His symptoms were short-lived however he did go to the ED at that time and his heart rate was noted to be in the 190s.  He reports being given adenosine with resolution of his arrhythmia as well as symptoms.  In 2018, patient presented to [facility] due to similar symptoms and was found to be in SVT with a heart rate of 164. Per chart, patient did drink approximately 4 cups of coffee that day and was taking Vyvanse at that time.  He was given adenosine with again resolution of his arrhythmia and symptoms.  During his recent hospital admission on 11/22/2021, patient presents to the ED secondary to syncope associated with presyncopal  lightheadedness and dizziness followed by substernal chest pain approximately 15 minutes after his syncopal episode.  He denies any diaphoresis, chest pain, dyspnea, vomiting, abdominal pain, headaches, or paresthesias prior to his event.  He does not recall losing consciousness.  At the lost consciousness he went to the store approximately 15 minutes later and noticed nonradiating substernal chest pain which prompted him to go to the ED.  patient's symptoms have resolved by the time he reached the ER.  EKG was normal sinus rhythm with PVC.  Troponins mildly elevated however down trending 0.029>>0.010. BP slightly hypertensive.  Patient is currently active in the  and exercises approximately 4 times a week without any limitations such as chest pain, dyspnea, dizziness, palpitations, lightheadedness, dizziness, or syncope.  He does report having a normal SPECT stress test a few years ago from his cardiologist Dr. Montoya.  He recently had echo that was also grossly unremarkable.  Patient denies any family history of arrhythmias/channelopathies, CAD, or sudden cardiac death.  No history of illicit drug use.  Patient drinks approximately 1 cup of coffee per day.  He is currently wearing a cardiac event monitor, which will be completed around December 25th.     Echo 11/23/21:  · The left ventricle is normal in size with normal systolic function. The estimated ejection fraction is 60%.  · Normal right ventricular size with normal right ventricular systolic function.  · Normal left ventricular diastolic function.  · The estimated PA systolic pressure is 21 mmHg.  · Normal central venous pressure (3 mmHg).    Cardiac event monitor 12/27/2021:  The baseline transmission corresponded to sinus rhythm with a rate of 67 bpm.  There was 1 activation for light-headedness corresponding to sinus rhythm with a rate of 71 bpm.  There was 1 activation for heart racing that corresponded to sinus rhythm and a 22 second run of  "a narrow complex tachycardia that accelerates to a peak rate at a rate of 150 bpm then decelerates prior to termination. Termination is with a QRS. This is most consistent with a nonsustained atrial tachycardia.  There was 1 asymptomatic activation corresponding to sinus rhythm with a rate of 100 bpm.  There were no auto-activations.      History:     Social History     Tobacco Use    Smoking status: Former Smoker    Smokeless tobacco: Never Used   Substance Use Topics    Alcohol use: Never     No family history on file.    Meds:   Review of patient's allergies indicates:  No Known Allergies    Current Outpatient Medications:     dextroamphetamine-amphetamine (ADDERALL XR) 20 MG 24 hr capsule, Take 20 mg by mouth as needed., Disp: , Rfl:     chlorthalidone (HYGROTEN) 25 MG Tab, Take 0.5 tablets (12.5 mg total) by mouth once daily., Disp: 15 tablet, Rfl: 3    metoprolol tartrate (LOPRESSOR) 25 MG tablet, Take 1 tablet (25 mg total) by mouth 2 (two) times daily as needed (Palpitations)., Disp: 60 tablet, Rfl: 11    simvastatin (ZOCOR) 5 MG tablet, Take 1 tablet (5 mg total) by mouth every evening., Disp: 90 tablet, Rfl: 3    telmisartan (MICARDIS) 80 MG Tab, Take 1 tablet (80 mg total) by mouth once daily., Disp: 90 tablet, Rfl: 3    Review of Systems   Constitutional: Negative for fever.   Eyes: Negative for blurred vision and double vision.   Respiratory: Negative for shortness of breath.    Cardiovascular: Negative for chest pain, palpitations, orthopnea, claudication, leg swelling and PND.   Gastrointestinal: Negative for abdominal pain, nausea and vomiting.   Neurological: Negative for loss of consciousness and weakness.       Objective:   /79 (BP Location: Left arm, Patient Position: Sitting, BP Method: Large (Automatic))   Pulse 85   Ht 5' 9" (1.753 m)   Wt 85.5 kg (188 lb 7.9 oz)   BMI 27.84 kg/m²   Physical Exam  Constitutional:       General: He is not in acute distress.     Appearance: He " is not diaphoretic.   HENT:      Head: Normocephalic and atraumatic.      Right Ear: External ear normal.      Left Ear: External ear normal.   Neck:      Thyroid: No thyromegaly.      Trachea: No tracheal deviation.   Cardiovascular:      Rate and Rhythm: Normal rate.      Pulses:           Carotid pulses are 2+ on the right side and 2+ on the left side.       Radial pulses are 2+ on the right side and 2+ on the left side.        Dorsalis pedis pulses are 2+ on the right side and 2+ on the left side.        Posterior tibial pulses are 2+ on the right side and 2+ on the left side.      Heart sounds: Normal heart sounds. No murmur heard.  No friction rub. No gallop.    Pulmonary:      Effort: No respiratory distress.      Breath sounds: No wheezing.   Abdominal:      General: There is no distension.      Tenderness: There is no abdominal tenderness. There is no rebound.   Musculoskeletal:         General: No tenderness, deformity or edema. Normal range of motion.   Lymphadenopathy:      Cervical: No cervical adenopathy.   Skin:     Findings: No erythema or rash.   Neurological:      Mental Status: He is alert and oriented to person, place, and time.         Labs:     Lab Results   Component Value Date     11/23/2021    K 3.8 11/23/2021     11/23/2021    CO2 30 (H) 11/23/2021    BUN 18 11/23/2021    CREATININE 0.9 11/23/2021    ANIONGAP 6 (L) 11/23/2021     Lab Results   Component Value Date    HGBA1C 5.4 11/23/2021     Lab Results   Component Value Date    BNP 14 11/22/2021       Lab Results   Component Value Date    WBC 7.39 11/23/2021    HGB 13.1 (L) 11/23/2021    HCT 37.9 (L) 11/23/2021     11/23/2021    GRAN 3.7 11/23/2021    GRAN 49.6 11/23/2021     Lab Results   Component Value Date    CHOL 232 (H) 11/23/2021    HDL 37 (L) 11/23/2021    LDLCALC Invalid, Trig>400.0 11/23/2021    TRIG 516 (H) 11/23/2021       Lab Results   Component Value Date     11/23/2021    K 3.8 11/23/2021      11/23/2021    CO2 30 (H) 11/23/2021    BUN 18 11/23/2021    CREATININE 0.9 11/23/2021    ANIONGAP 6 (L) 11/23/2021     Lab Results   Component Value Date    HGBA1C 5.4 11/23/2021     Lab Results   Component Value Date    BNP 14 11/22/2021    Lab Results   Component Value Date    WBC 7.39 11/23/2021    HGB 13.1 (L) 11/23/2021    HCT 37.9 (L) 11/23/2021     11/23/2021    GRAN 3.7 11/23/2021    GRAN 49.6 11/23/2021     Lab Results   Component Value Date    CHOL 232 (H) 11/23/2021    HDL 37 (L) 11/23/2021    LDLCALC Invalid, Trig>400.0 11/23/2021    TRIG 516 (H) 11/23/2021            Assessment & Plan:     Atrial Tachycardia  -instructed patient to follow-up with EP for schedule his procedure.  -will add metoprolol tartrate 25 mg b.i.d. p.r.n. palpitations, lightheadedness.  Patient has Apple watch, did instruct him to capture his rhythm should he notice any of these symptoms.     Syncope:  -as stated above  -if another occurrence happens, patient is instructed to go to the ER.     Hypertension:  -patient instructed to keep a log and bring with him next time he comes in.  -will continue current medication regimen.  Refills sent to his pharmacy.    Hyperlipidemia:  -simvastatin 5 mg daily       Signed:  Ric Delaney M.D.  Page # [phone]  Cardiovascular Fellow  [facility]

## 2022-01-19 ENCOUNTER — TELEPHONE (OUTPATIENT)
Dept: ELECTROPHYSIOLOGY | Facility: CLINIC | Age: 57
End: 2022-01-19
Payer: OTHER GOVERNMENT

## 2022-01-19 NOTE — TELEPHONE ENCOUNTER
----- Message from Mamta Montano sent at 1/19/2022 10:49 AM CST -----  Regarding: [facility] procedure  Pt [phone] says he's returning a missed call in ref to [facility] his procedure.    Thanks

## 2022-01-19 NOTE — TELEPHONE ENCOUNTER
Spoke with Mr. Byrd and scheduled his procedure for    2/18/22. Procedure details reviewed and instructions will be sent via patient portal as requested.

## 2022-01-24 DIAGNOSIS — I47.10 SVT (SUPRAVENTRICULAR TACHYCARDIA): Primary | ICD-10-CM

## 2022-01-31 ENCOUNTER — PATIENT MESSAGE (OUTPATIENT)
Dept: ELECTROPHYSIOLOGY | Facility: CLINIC | Age: 57
End: 2022-01-31
Payer: OTHER GOVERNMENT

## 2022-02-15 ENCOUNTER — LAB VISIT (OUTPATIENT)
Dept: LAB | Facility: HOSPITAL | Age: 57
End: 2022-02-15
Payer: OTHER GOVERNMENT

## 2022-02-15 DIAGNOSIS — I47.10 SVT (SUPRAVENTRICULAR TACHYCARDIA): ICD-10-CM

## 2022-02-15 DIAGNOSIS — E78.5 HYPERLIPIDEMIA, UNSPECIFIED HYPERLIPIDEMIA TYPE: ICD-10-CM

## 2022-02-15 LAB
ANION GAP SERPL CALC-SCNC: 9 MMOL/L (ref 8–16)
APTT BLDCRRT: 26.8 SEC (ref 21–32)
BUN SERPL-MCNC: 17 MG/DL (ref 6–20)
CALCIUM SERPL-MCNC: 10.3 MG/DL (ref 8.7–10.5)
CHLORIDE SERPL-SCNC: 100 MMOL/L (ref 95–110)
CHOLEST SERPL-MCNC: 303 MG/DL (ref 120–199)
CHOLEST/HDLC SERPL: 4.8 {RATIO} (ref 2–5)
CO2 SERPL-SCNC: 30 MMOL/L (ref 23–29)
CREAT SERPL-MCNC: 1 MG/DL (ref 0.5–1.4)
ERYTHROCYTE [DISTWIDTH] IN BLOOD BY AUTOMATED COUNT: 11.8 % (ref 11.5–14.5)
EST. GFR  (AFRICAN AMERICAN): >60 ML/MIN/1.73 M^2
EST. GFR  (NON AFRICAN AMERICAN): >60 ML/MIN/1.73 M^2
GLUCOSE SERPL-MCNC: 95 MG/DL (ref 70–110)
HCT VFR BLD AUTO: 44.8 % (ref 40–54)
HDLC SERPL-MCNC: 63 MG/DL (ref 40–75)
HDLC SERPL: 20.8 % (ref 20–50)
HGB BLD-MCNC: 14.9 G/DL (ref 14–18)
INR PPP: 0.9 (ref 0.8–1.2)
LDLC SERPL CALC-MCNC: 215.4 MG/DL (ref 63–159)
MCH RBC QN AUTO: 30.4 PG (ref 27–31)
MCHC RBC AUTO-ENTMCNC: 33.3 G/DL (ref 32–36)
MCV RBC AUTO: 91 FL (ref 82–98)
NONHDLC SERPL-MCNC: 240 MG/DL
PLATELET # BLD AUTO: 295 K/UL (ref 150–450)
PMV BLD AUTO: 10 FL (ref 9.2–12.9)
POTASSIUM SERPL-SCNC: 4.2 MMOL/L (ref 3.5–5.1)
PROTHROMBIN TIME: 9.8 SEC (ref 9–12.5)
RBC # BLD AUTO: 4.9 M/UL (ref 4.6–6.2)
SODIUM SERPL-SCNC: 139 MMOL/L (ref 136–145)
TRIGL SERPL-MCNC: 123 MG/DL (ref 30–150)
WBC # BLD AUTO: 7.38 K/UL (ref 3.9–12.7)

## 2022-02-15 PROCEDURE — 85027 COMPLETE CBC AUTOMATED: CPT | Performed by: INTERNAL MEDICINE

## 2022-02-15 PROCEDURE — 85610 PROTHROMBIN TIME: CPT | Performed by: INTERNAL MEDICINE

## 2022-02-15 PROCEDURE — 85730 THROMBOPLASTIN TIME PARTIAL: CPT | Performed by: INTERNAL MEDICINE

## 2022-02-15 PROCEDURE — 80061 LIPID PANEL: CPT | Performed by: INTERNAL MEDICINE

## 2022-02-15 PROCEDURE — 36415 COLL VENOUS BLD VENIPUNCTURE: CPT | Performed by: INTERNAL MEDICINE

## 2022-02-15 PROCEDURE — 80048 BASIC METABOLIC PNL TOTAL CA: CPT | Performed by: INTERNAL MEDICINE

## 2022-02-16 ENCOUNTER — PATIENT MESSAGE (OUTPATIENT)
Dept: PSYCHIATRY | Facility: CLINIC | Age: 57
End: 2022-02-16
Payer: OTHER GOVERNMENT

## 2022-02-17 ENCOUNTER — TELEPHONE (OUTPATIENT)
Dept: ELECTROPHYSIOLOGY | Facility: CLINIC | Age: 57
End: 2022-02-17
Payer: OTHER GOVERNMENT

## 2022-02-17 NOTE — TELEPHONE ENCOUNTER
Attempted to contact patient to confirm procedure details. No answer. Left detailed voicemail including: arrival time, NPO after midnight and medication instructions, along with callback number.     Labs done, no alerts    Covid on admit

## 2022-02-18 ENCOUNTER — HOSPITAL ENCOUNTER (OUTPATIENT)
Facility: HOSPITAL | Age: 57
Discharge: HOME | End: 2022-02-18
Attending: INTERNAL MEDICINE | Admitting: INTERNAL MEDICINE
Payer: OTHER GOVERNMENT

## 2022-02-18 ENCOUNTER — ANESTHESIA (OUTPATIENT)
Dept: MEDSURG UNIT | Facility: HOSPITAL | Age: 57
End: 2022-02-18
Payer: OTHER GOVERNMENT

## 2022-02-18 ENCOUNTER — ANESTHESIA EVENT (OUTPATIENT)
Dept: MEDSURG UNIT | Facility: HOSPITAL | Age: 57
End: 2022-02-18
Payer: OTHER GOVERNMENT

## 2022-02-18 VITALS
TEMPERATURE: 98 F | HEART RATE: 85 BPM | HEIGHT: 69 IN | SYSTOLIC BLOOD PRESSURE: 112 MMHG | BODY MASS INDEX: 27.4 KG/M2 | OXYGEN SATURATION: 97 % | DIASTOLIC BLOOD PRESSURE: 72 MMHG | WEIGHT: 185 LBS | RESPIRATION RATE: 16 BRPM

## 2022-02-18 DIAGNOSIS — I47.10 SVT (SUPRAVENTRICULAR TACHYCARDIA): ICD-10-CM

## 2022-02-18 DIAGNOSIS — I49.9 ARRHYTHMIA: ICD-10-CM

## 2022-02-18 LAB
CTP QC/QA: YES
SARS-COV-2 AG RESP QL IA.RAPID: NEGATIVE

## 2022-02-18 PROCEDURE — 63600175 PHARM REV CODE 636 W HCPCS: Performed by: STUDENT IN AN ORGANIZED HEALTH CARE EDUCATION/TRAINING PROGRAM

## 2022-02-18 PROCEDURE — 94761 N-INVAS EAR/PLS OXIMETRY MLT: CPT | Mod: 59

## 2022-02-18 PROCEDURE — 00537 ANES CARDIAC EP PROCEDURES: CPT | Performed by: INTERNAL MEDICINE

## 2022-02-18 PROCEDURE — 25000003 PHARM REV CODE 250: Performed by: INTERNAL MEDICINE

## 2022-02-18 PROCEDURE — 93010 ELECTROCARDIOGRAM REPORT: CPT | Mod: ,,, | Performed by: INTERNAL MEDICINE

## 2022-02-18 PROCEDURE — C1730 CATH, EP, 19 OR FEW ELECT: HCPCS | Performed by: INTERNAL MEDICINE

## 2022-02-18 PROCEDURE — 93653 COMPRE EP EVAL TX SVT: CPT | Mod: ,,, | Performed by: INTERNAL MEDICINE

## 2022-02-18 PROCEDURE — 37000008 HC ANESTHESIA 1ST 15 MINUTES: Performed by: INTERNAL MEDICINE

## 2022-02-18 PROCEDURE — 93010 EKG 12-LEAD: ICD-10-PCS | Mod: ,,, | Performed by: INTERNAL MEDICINE

## 2022-02-18 PROCEDURE — 37000009 HC ANESTHESIA EA ADD 15 MINS: Performed by: INTERNAL MEDICINE

## 2022-02-18 PROCEDURE — D9220A PRA ANESTHESIA: ICD-10-PCS | Mod: ANES,,, | Performed by: STUDENT IN AN ORGANIZED HEALTH CARE EDUCATION/TRAINING PROGRAM

## 2022-02-18 PROCEDURE — 93005 ELECTROCARDIOGRAM TRACING: CPT

## 2022-02-18 PROCEDURE — 93653 COMPRE EP EVAL TX SVT: CPT | Performed by: INTERNAL MEDICINE

## 2022-02-18 PROCEDURE — C1894 INTRO/SHEATH, NON-LASER: HCPCS | Performed by: INTERNAL MEDICINE

## 2022-02-18 PROCEDURE — D9220A PRA ANESTHESIA: Mod: ANES,,, | Performed by: STUDENT IN AN ORGANIZED HEALTH CARE EDUCATION/TRAINING PROGRAM

## 2022-02-18 PROCEDURE — C1733 CATH, EP, OTHR THAN COOL-TIP: HCPCS | Performed by: INTERNAL MEDICINE

## 2022-02-18 PROCEDURE — 93653 PR ELECTROPHYS EVAL, COMPREHEN, W/SUPRAVENT TACHYCARD TRMT: ICD-10-PCS | Mod: ,,, | Performed by: INTERNAL MEDICINE

## 2022-02-18 PROCEDURE — D9220A PRA ANESTHESIA: Mod: CRNA,,, | Performed by: NURSE ANESTHETIST, CERTIFIED REGISTERED

## 2022-02-18 PROCEDURE — 27201423 OPTIME MED/SURG SUP & DEVICES STERILE SUPPLY: Performed by: INTERNAL MEDICINE

## 2022-02-18 PROCEDURE — 93005 ELECTROCARDIOGRAM TRACING: CPT | Mod: 59

## 2022-02-18 PROCEDURE — 25000003 PHARM REV CODE 250: Performed by: STUDENT IN AN ORGANIZED HEALTH CARE EDUCATION/TRAINING PROGRAM

## 2022-02-18 PROCEDURE — D9220A PRA ANESTHESIA: ICD-10-PCS | Mod: CRNA,,, | Performed by: NURSE ANESTHETIST, CERTIFIED REGISTERED

## 2022-02-18 RX ORDER — ASPIRIN 81 MG/1
81 TABLET ORAL DAILY
Qty: 30 TABLET | Refills: 0 | Status: SHIPPED | OUTPATIENT
Start: 2022-02-18 | End: 2022-03-31

## 2022-02-18 RX ORDER — ACETAMINOPHEN 325 MG/1
650 TABLET ORAL EVERY 4 HOURS PRN
Status: DISCONTINUED | OUTPATIENT
Start: 2022-02-18 | End: 2022-02-18 | Stop reason: HOSPADM

## 2022-02-18 RX ORDER — LIDOCAINE HYDROCHLORIDE 20 MG/ML
INJECTION, SOLUTION INFILTRATION; PERINEURAL
Status: DISCONTINUED | OUTPATIENT
Start: 2022-02-18 | End: 2022-02-18 | Stop reason: HOSPADM

## 2022-02-18 RX ORDER — PROPOFOL 10 MG/ML
VIAL (ML) INTRAVENOUS CONTINUOUS PRN
Status: DISCONTINUED | OUTPATIENT
Start: 2022-02-18 | End: 2022-02-18

## 2022-02-18 RX ORDER — SODIUM CHLORIDE 0.9 % (FLUSH) 0.9 %
3 SYRINGE (ML) INJECTION
Status: DISCONTINUED | OUTPATIENT
Start: 2022-02-18 | End: 2022-02-18 | Stop reason: HOSPADM

## 2022-02-18 RX ORDER — PROPOFOL 10 MG/ML
VIAL (ML) INTRAVENOUS
Status: DISCONTINUED | OUTPATIENT
Start: 2022-02-18 | End: 2022-02-18

## 2022-02-18 RX ORDER — HEPARIN SOD,PORCINE/0.9 % NACL 1000/500ML
INTRAVENOUS SOLUTION INTRAVENOUS
Status: DISCONTINUED | OUTPATIENT
Start: 2022-02-18 | End: 2022-02-18 | Stop reason: HOSPADM

## 2022-02-18 RX ADMIN — PROPOFOL 150 MCG/KG/MIN: 10 INJECTION, EMULSION INTRAVENOUS at 01:02

## 2022-02-18 RX ADMIN — SODIUM CHLORIDE: 9 INJECTION, SOLUTION INTRAVENOUS at 01:02

## 2022-02-18 RX ADMIN — ACETAMINOPHEN 650 MG: 325 TABLET ORAL at 06:02

## 2022-02-18 RX ADMIN — PROPOFOL 40 MG: 10 INJECTION, EMULSION INTRAVENOUS at 01:02

## 2022-02-18 NOTE — DISCHARGE INSTRUCTIONS
Start and continue aspirin 81 mg for 30 days   Do not strain or lift anything greater than 5 lb for 1 week.  Do not drive or operate any dangerous machinery for 24 hours.   Clean the area with mild soap and water and then cover it with a bandage.   Once the skin has healed, bathing in a tub or swimming is allowed.   Inspect the groin site daily and report to the physician any swelling at the site that   cannot be controlled with manual pressure for 10 minutes, unusual pain at the   access site or affected extremity, unusual swelling at the access site, or signs or   symptoms of infection such as redness, pain, or fever.   Call 911 if you have:   Bleeding from the puncture site that you cannot stop by doing the following:   Relax and lie down right away. Keep your leg flat and apply firm pressure to the site using your fingers and a gauze pad. Keep the pressure on for 20 minutes. Continue this until the bleeding stops. This may take awhile. When bleeding stops, cover the site with a sterile bandage and keep your leg still as much as possible.

## 2022-02-18 NOTE — Clinical Note
Manual pressure was applied to the right femoral vein sheath insertion site. Gauze and tegaderm dressing applied to bilateral groins after hemostasis achieved

## 2022-02-18 NOTE — H&P
Electrophysiology Pre Procedure H&P  Reason for visit: Elective EP study and ablation     HPI:   Brice Byrd is a 56 year old male with a history significant for hypertension, and SVT dating back to 2015 .  In 2015 patient first reported symptoms of nausea, and lightheadedness and near-syncope.  His symptoms were short-lived however he did go to the ED at that time and his heart rate was noted to be in the 190s.  He reports being given adenosine with resolution of his arrhythmia as well as symptoms.  In 2018, patient presented to [facility] due to similar symptoms and was found to be in SVT with a heart rate of 164. He has had  recent hospital admission on 11/22/2021 for  syncope associated with presyncopal lightheadedness and dizziness thought to be vasovegal. He followed up with arrhythmia service and has opted for EP study and possible catheter ablation.     Today she reports feeling well. He wore a monitor that captured SVT. He has had a short spell that terminated with vagal maneuver at home. He is clinically stable. Procedure was explained to him and he expressed understanding and consented to the planned procedure.   ECG today shows normal sinus rhythm and intervals.     Past Medical History:   Diagnosis Date    Hypertension     SVT (supraventricular tachycardia)         Social History     Socioeconomic History    Marital status:    Tobacco Use    Smoking status: Former Smoker    Smokeless tobacco: Never Used   Substance and Sexual Activity    Alcohol use: Never    Drug use: Never    Sexual activity: Yes     Partners: Female        No family history on file.     No current facility-administered medications for this encounter.     Current Outpatient Medications   Medication Sig Dispense Refill    chlorthalidone (HYGROTEN) 25 MG Tab Take 0.5 tablets (12.5 mg total) by mouth once daily. 15 tablet 3    dextroamphetamine-amphetamine (ADDERALL XR) 20 MG 24 hr capsule Take 20  mg by mouth as needed.      metoprolol tartrate (LOPRESSOR) 25 MG tablet Take 1 tablet (25 mg total) by mouth 2 (two) times daily as needed (Palpitations). 60 tablet 11    simvastatin (ZOCOR) 5 MG tablet Take 1 tablet (5 mg total) by mouth every evening. 90 tablet 3    telmisartan (MICARDIS) 80 MG Tab Take 1 tablet (80 mg total) by mouth once daily. 90 tablet 3      ROS:  Constitutional: (-)fevers, (-)chills, (-)night sweats  HEENT: (-) headaches (-) lightheadedness (-) blurry vision  Cardiovascular: (-)chest pain (-)paroxysmal nocturnal dyspnea (-)orthopnea  Respiratory: (-)shortness of breath, (-)dyspnea on exertion (-)hemoptysis  Gastrointestinal: (-)abdominal pain (-)nausea (-)vomiting (-)tarry stools  Musculoskeletal: (-)arthralgias (-)limited range of motion  Neurologic: (-)parasthesias (-)mood disorder (-)anxiety  Endo: (-)polyuria (-)polydipsia (-)heat/cold intolerance  Skin: (-)rash       Physical Exam:   Gen: no acute distress, pleasant patient answering questions appropriately  HEENT: extra-ocular muscles intact, normocephalic-atraumatic  Neck: no jugular venous distension, no lymphadenopathy, no thyromegaly, no carotid bruits  CVS: regular rate and rhythm, S1S2 normal, no murmurs/rubs/gallops  CHEST: clear to auscultation bilaterally, no wheezes/rales/ronchi  ABD: Soft, non-tender, nondistended, bowel sounds present  EXT: No Edema, 2+ pulses bilaterally  NEURO: awake, alert, and oriented   Skin: No rash     Review of Labs:   Lab Results   Component Value Date    WBC 7.38 02/15/2022    HGB 14.9 02/15/2022    HCT 44.8 02/15/2022    MCV 91 02/15/2022     02/15/2022       CMP  Sodium   Date Value Ref Range Status   02/15/2022 139 136 - 145 mmol/L Final     Potassium   Date Value Ref Range Status   02/15/2022 4.2 3.5 - 5.1 mmol/L Final     Chloride   Date Value Ref Range Status   02/15/2022 100 95 - 110 mmol/L Final     CO2   Date Value Ref Range Status   02/15/2022 30 (H) 23 - 29 mmol/L Final      Glucose   Date Value Ref Range Status   02/15/2022 95 70 - 110 mg/dL Final     BUN   Date Value Ref Range Status   02/15/2022 17 6 - 20 mg/dL Final     Creatinine   Date Value Ref Range Status   02/15/2022 1.0 0.5 - 1.4 mg/dL Final     Calcium   Date Value Ref Range Status   02/15/2022 10.3 8.7 - 10.5 mg/dL Final     Total Protein   Date Value Ref Range Status   11/23/2021 5.8 (L) 6.0 - 8.4 g/dL Final     Albumin   Date Value Ref Range Status   11/23/2021 3.3 (L) 3.5 - 5.2 g/dL Final     Total Bilirubin   Date Value Ref Range Status   11/23/2021 0.2 0.1 - 1.0 mg/dL Final     Comment:     For infants and newborns, interpretation of results should be based  on gestational age, weight and in agreement with clinical  observations.    Premature Infant recommended reference ranges:  Up to 24 hours.............<8.0 mg/dL  Up to 48 hours............<12.0 mg/dL  3-5 days..................<15.0 mg/dL  6-29 days.................<15.0 mg/dL       Alkaline Phosphatase   Date Value Ref Range Status   11/23/2021 110 55 - 135 U/L Final     AST   Date Value Ref Range Status   11/23/2021 65 (H) 10 - 40 U/L Final     ALT   Date Value Ref Range Status   11/23/2021 133 (H) 10 - 44 U/L Final     Anion Gap   Date Value Ref Range Status   02/15/2022 9 8 - 16 mmol/L Final     eGFR if    Date Value Ref Range Status   02/15/2022 >60.0 >60 mL/min/1.73 m^2 Final     eGFR if non    Date Value Ref Range Status   02/15/2022 >60.0 >60 mL/min/1.73 m^2 Final     Comment:     Calculation used to obtain the estimated glomerular filtration  rate (eGFR) is the CKD-EPI equation.        Lab Results   Component Value Date    INR 0.9 02/15/2022    INR 0.9 11/23/2021         Most recent ECG  Sinus rhythm, no preexcitation    Summary:  56 year old male here for SVT management. He has recurrent, adenosine sensitive SVT. He also had a recent syncopal spell. He was seen in arrhythmia clinic and has now opted to EP study and  catheter ablation. He is clinically stable today. Procedure for EP study and ablation were discussed with him including potential risks of the procedure. He expressed understanding and has consented to the planned procedure.       Plan:  Proceed to the planned EP study and possible SVT ablation.

## 2022-02-18 NOTE — TRANSFER OF CARE
"0Anesthesia Transfer of Care Note    Patient: Brice Byrd    Procedure(s) Performed: Procedure(s) (LRB):  Ablation (N/A)    Patient location: PACU    Anesthesia Type: general    Transport from OR: Transported from OR on 6-10 L/min O2 by face mask with adequate spontaneous ventilation    Post pain: adequate analgesia    Post assessment: no apparent anesthetic complications and tolerated procedure well    Post vital signs: stable    Level of consciousness: awake and alert    Nausea/Vomiting: no nausea/vomiting    Complications: none    Transfer of care protocol was followed      Last vitals:   Visit Vitals  /82 (BP Location: Right arm, Patient Position: Lying)   Pulse 76   Temp 36.2 °C (97.1 °F) (Oral)   Resp 18   Ht 5' 9" (1.753 m)   Wt 83.9 kg (185 lb)   SpO2 99%   BMI 27.32 kg/m²     "

## 2022-02-18 NOTE — HOSPITAL COURSE
Brice Byrd is a 56 year old male with a history significant for hypertension, and SVT dating back to 2015 .  In 2015 patient first reported symptoms of nausea, and lightheadedness and near-syncope.  His symptoms were short-lived however he did go to the ED at that time and his heart rate was noted to be in the 190s.  He reports being given adenosine with resolution of his arrhythmia as well as symptoms.  In 2018, patient presented to [facility] due to similar symptoms and was found to be in SVT with a heart rate of 164. He has had  recent hospital admission on 11/22/2021 for  syncope associated with presyncopal lightheadedness and dizziness thought to be vasovegal. He followed up with arrhythmia service and has opted for EP study and possible catheter ablation.      Today she reports feeling well. He wore a monitor that captured SVT. He has had a short spell that terminated with vagal maneuver at home. He is clinically stable. Procedure was explained to him and he expressed understanding and consented to the planned procedure.   ECG today shows normal sinus rhythm and intervals.     After consenting to the procedure, Mr. 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 He tolerated the procedure well and was discharged following bedrest in good condition.

## 2022-02-18 NOTE — ANESTHESIA PREPROCEDURE EVALUATION
02/18/2022  Brice Byrd is a 56 y.o., male.    Anesthesia Evaluation    I have reviewed the Patient Summary Reports.      I have reviewed the Medications.     Review of Systems  Anesthesia Hx:  History of prior surgery of interest to airway management or planning:  Denies Personal Hx of Anesthesia complications.   Social:  Former Smoker    Cardiovascular:   Hypertension Dysrhythmias hyperlipidemia NL BiV Fxn   Hepatic/GI:   Liver Disease,        Physical Exam  General:  Well nourished    Airway/Jaw/Neck:  Airway Findings: Mouth Opening: Normal Tongue: Normal  General Airway Assessment: Adult  Mallampati: III  Improves to II with phonation.  TM Distance: Normal, at least 6 cm  Jaw/Neck Findings:  Neck ROM: Normal ROM       Chest/Lungs:  Chest/Lungs Findings: Normal Respiratory Rate     Heart/Vascular:  Heart Findings: Rate: Normal        Mental Status:  Mental Status Findings:  Alert and Oriented         Anesthesia Plan  Type of Anesthesia, risks & benefits discussed:  Anesthesia Type:  general    Patient's Preference:   Plan Factors:          Intra-op Monitoring Plan: standard ASA monitors  Intra-op Monitoring Plan Comments:   Post Op Pain Control Plan: multimodal analgesia  Post Op Pain Control Plan Comments:     Induction:   IV  Beta Blocker:         Informed Consent: Patient understands risks and agrees with Anesthesia plan.  Questions answered. Anesthesia consent signed with patient.  ASA Score: 3     Day of Surgery Review of History & Physical:    H&P update referred to the surgeon.     Anesthesia Plan Notes: NPO confirmed.   No history of anesthesia problems.         Ready For Surgery From Anesthesia Perspective.

## 2022-02-18 NOTE — BRIEF OP NOTE
: Al Guerra M.D  Date of procedure: 2/18/2022  Post-operative Diagnosis: AVNRT    Procedure Performed: RFA to the region of the slow pathway for treatment of AVNRT.     Description of Procedure: The patient was brought to the EP lab in the fasting state. Prepped and draped in sterile fashion. Safety timeout was performed. Sedation administered by anesthesia staff. Ultrasound guided venous access of the bilateral femoral veins was performed. HRA, HIS, and RV catheters placed via left femoral vein access. CS and Ablation catheters via right femoral vein access. Diagnostic EP study confirmed AVNRT. RFA to the slow pathway for treatment of AVNRT. Non inducible at end of study.     EBL: <10 mL    Specimens Removed: None  Complications: no immediate      Plan   Post op orders including bedrest x 4 hours  Resume routine home medications     Jordin Mota  EP Fellow

## 2022-02-18 NOTE — DISCHARGE SUMMARY
[facility] - [facility]iology  Cardiac Electrophysiology  Discharge Summary      Patient Name: Brice Byrd  MRN: [id number]  Admission Date: 2/18/2022  Hospital Length of Stay: 0 days  Discharge Date and Time:  02/18/2022 4:50 PM  Attending Physician: Al Guerra MD    Discharging Provider: Jordin Mota MD  Primary Care Physician: Andrade Avalos DO      Procedure(s) (LRB):  Ablation (N/A)     Indwelling Lines/Drains at time of discharge:  Lines/Drains/Airways     None                 Hospital Course:    Brice Byrd is a 56 year old male with a history significant for hypertension, and SVT dating back to 2015 .  In 2015 patient first reported symptoms of nausea, and lightheadedness and near-syncope.  His symptoms were short-lived however he did go to the ED at that time and his heart rate was noted to be in the 190s.  He reports being given adenosine with resolution of his arrhythmia as well as symptoms.  In 2018, patient presented to [facility] due to similar symptoms and was found to be in SVT with a heart rate of 164. He has had  recent hospital admission on 11/22/2021 for  syncope associated with presyncopal lightheadedness and dizziness thought to be vasovegal. He followed up with arrhythmia service and has opted for EP study and possible catheter ablation.      Today she reports feeling well. He wore a monitor that captured SVT. He has had a short spell that terminated with vagal maneuver at home. He is clinically stable. Procedure was explained to him and he expressed understanding and consented to the planned procedure.   ECG today shows normal sinus rhythm and intervals.     After consenting to the procedure, Mr. 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 He tolerated the procedure well and was discharged following bedrest in good condition.       Goals of Care Treatment Preferences:  Code Status: Full  Code      Patient instructions:  Start and continue aspirin 81 mg for 30 days   Do not strain or lift anything greater than 5 lb for 1 week.  Do not drive or operate any dangerous machinery for 24 hours.   Clean the area with mild soap and water and then cover it with a bandage.   Once the skin has healed, bathing in a tub or swimming is allowed.   Inspect the groin site daily and report to the physician any swelling at the site that   cannot be controlled with manual pressure for 10 minutes, unusual pain at the   access site or affected extremity, unusual swelling at the access site, or signs or   symptoms of infection such as redness, pain, or fever.   Call 911 if you have:   Bleeding from the puncture site that you cannot stop by doing the following:   Relax and lie down right away. Keep your leg flat and apply firm pressure to the site using your fingers and a gauze pad. Keep the pressure on for 20 minutes. Continue this until the bleeding stops. This may take awhile. When bleeding stops, cover the site with a sterile bandage and keep your leg still as much as possible.        Consults: None     Significant Diagnostic Studies:   Lab Results   Component Value Date    WBC 7.38 02/15/2022    HGB 14.9 02/15/2022    HCT 44.8 02/15/2022    MCV 91 02/15/2022     02/15/2022       CMP  Sodium   Date Value Ref Range Status   02/15/2022 139 136 - 145 mmol/L Final     Potassium   Date Value Ref Range Status   02/15/2022 4.2 3.5 - 5.1 mmol/L Final     Chloride   Date Value Ref Range Status   02/15/2022 100 95 - 110 mmol/L Final     CO2   Date Value Ref Range Status   02/15/2022 30 (H) 23 - 29 mmol/L Final     Glucose   Date Value Ref Range Status   02/15/2022 95 70 - 110 mg/dL Final     BUN   Date Value Ref Range Status   02/15/2022 17 6 - 20 mg/dL Final     Creatinine   Date Value Ref Range Status   02/15/2022 1.0 0.5 - 1.4 mg/dL Final     Calcium   Date Value Ref Range Status   02/15/2022 10.3 8.7 - 10.5 mg/dL Final      Total Protein   Date Value Ref Range Status   11/23/2021 5.8 (L) 6.0 - 8.4 g/dL Final     Albumin   Date Value Ref Range Status   11/23/2021 3.3 (L) 3.5 - 5.2 g/dL Final     Total Bilirubin   Date Value Ref Range Status   11/23/2021 0.2 0.1 - 1.0 mg/dL Final     Comment:     For infants and newborns, interpretation of results should be based  on gestational age, weight and in agreement with clinical  observations.    Premature Infant recommended reference ranges:  Up to 24 hours.............<8.0 mg/dL  Up to 48 hours............<12.0 mg/dL  3-5 days..................<15.0 mg/dL  6-29 days.................<15.0 mg/dL       Alkaline Phosphatase   Date Value Ref Range Status   11/23/2021 110 55 - 135 U/L Final     AST   Date Value Ref Range Status   11/23/2021 65 (H) 10 - 40 U/L Final     ALT   Date Value Ref Range Status   11/23/2021 133 (H) 10 - 44 U/L Final     Anion Gap   Date Value Ref Range Status   02/15/2022 9 8 - 16 mmol/L Final     eGFR if    Date Value Ref Range Status   02/15/2022 >60.0 >60 mL/min/1.73 m^2 Final     eGFR if non    Date Value Ref Range Status   02/15/2022 >60.0 >60 mL/min/1.73 m^2 Final     Comment:     Calculation used to obtain the estimated glomerular filtration  rate (eGFR) is the CKD-EPI equation.            Pending Diagnostic Studies:     None          Final Active Diagnoses:    Diagnosis Date Noted POA    PRINCIPAL PROBLEM:  SVT (supraventricular tachycardia) [I47.1]  Yes      Problems Resolved During this Admission:     No new Assessment & Plan notes have been filed under this hospital service since the last note was generated.  Service: Arrhythmia      Discharged Condition: good    Disposition: home     Follow Up:   Follow-up Information     Al Guerra MD In 3 months.    Specialties: Electrophysiology, Cardiovascular Disease  Contact information:  [address] [place] [id number] [phone]                       Patient  Instructions:   No discharge procedures on file.  Medications:  Reconciled Home Medications:      Medication List      START taking these medications    aspirin 81 MG EC tablet  Commonly known as: ECOTRIN  Take 1 tablet (81 mg total) by mouth once daily.        CONTINUE taking these medications    chlorthalidone 25 MG Tab  Commonly known as: HYGROTEN  Take 0.5 tablets (12.5 mg total) by mouth once daily.     dextroamphetamine-amphetamine 20 MG 24 hr capsule  Commonly known as: ADDERALL XR  Take 20 mg by mouth as needed.     simvastatin 5 MG tablet  Commonly known as: ZOCOR  Take 1 tablet (5 mg total) by mouth every evening.     telmisartan 80 MG Tab  Commonly known as: MICARDIS  Take 1 tablet (80 mg total) by mouth once daily.        STOP taking these medications    metoprolol tartrate 25 MG tablet  Commonly known as: LOPRESSOR            Time spent on the discharge of patient: 20 minutes    Jordin Mota MD  Cardiac Electrophysiology  [facility] - Cardiology

## 2022-02-19 NOTE — NURSING TRANSFER
Nursing Transfer Note      2/18/2022     Reason patient is being transferred: Discharge    Transfer To: Parking Garage    Transfer via wheelchair    Transfer with : pair of eyeglasses with patient, personal belongings with wife    Transported by : PCT    Medicines sent: aspirin    Any special needs or follow-up needed: discharge instruction given    Chart send with patient: No    Notified: spouse--at bedside, will take patient home    Patient reassessed at: 2-18-22

## 2022-02-19 NOTE — PLAN OF CARE
Recovered from anesthesia,alert and oriented. VS stable in NSR. Bilateral groin dressing intact and dry, soft on palpation, no hematoma. Completed 4 hours post procedure bedsrest. Had jello and crackers and juice, no nausea. Walked along PACU corridor, no bleeding or hematoma post ambulation. Patient urinated. Wife at bedside, discharge instruction discussed, questions answered. IV cannula removed.

## 2022-02-21 NOTE — ANESTHESIA POSTPROCEDURE EVALUATION
Anesthesia Post Evaluation    Patient: Brice Byrd    Procedure(s) Performed: Procedure(s) (LRB):  Ablation (N/A)    Final Anesthesia Type: general      Patient location during evaluation: PACU  Patient participation: Yes- Able to Participate  Level of consciousness: awake and alert  Post-procedure vital signs: reviewed and stable  Pain management: adequate  Airway patency: patent  GISSELLE mitigation strategies: Extubation while patient is awake  PONV status at discharge: No PONV  Anesthetic complications: no      Cardiovascular status: stable  Respiratory status: spontaneous ventilation and face mask  Hydration status: euvolemic  Follow-up not needed.          Vitals Value Taken Time   /72 02/18/22 1907   Temp 36.7 °C (98.1 °F) 02/18/22 1700   Pulse 91 02/18/22 1920   Resp 25 02/18/22 1920   SpO2 97 % 02/18/22 1920   Vitals shown include unvalidated device data.      Event Time   Out of Recovery 19:33:00         Pain/Linh Score: No data recorded

## 2022-02-23 ENCOUNTER — TELEPHONE (OUTPATIENT)
Dept: ELECTROPHYSIOLOGY | Facility: CLINIC | Age: 57
End: 2022-02-23
Payer: OTHER GOVERNMENT

## 2022-02-23 NOTE — TELEPHONE ENCOUNTER
Spoke to: Brice Felipe    Does the patient have any concerns, questions about post op instructions? No     Does the patient have any concerns, questions about wound site? no just reports some soreness and bruising, no redness or swelling.       Has the patient resumed medications and/or picked up new prescriptions ordered at discharge? Yes taking 81 mg asa daily    Does the patient have any other questions regarding their procedure? No    Patient reports HR at night in the 50's.  Maximum HR during the day 100 with activity.   Needs letter for the [facility] regarding medication.  Will discuss with Dr. Guerra and send letter to patient portal for patient to send from there.       Patient doing well and had no further questions. He appreciated the call.

## 2022-02-24 ENCOUNTER — PATIENT MESSAGE (OUTPATIENT)
Dept: ELECTROPHYSIOLOGY | Facility: CLINIC | Age: 57
End: 2022-02-24
Payer: OTHER GOVERNMENT

## 2022-02-24 ENCOUNTER — TELEPHONE (OUTPATIENT)
Dept: ELECTROPHYSIOLOGY | Facility: CLINIC | Age: 57
End: 2022-02-24
Payer: OTHER GOVERNMENT

## 2022-02-24 NOTE — TELEPHONE ENCOUNTER
----- Message from Al Guerra MD sent at 2/23/2022  7:39 PM CST -----  Regarding: RE: needs letter for [place]  Yes fine with me thanks  ----- Message -----  From: Katie Barlow RN  Sent: 2/23/2022  11:26 AM CST  To: Al Guerra MD  Subject: needs letter for [employer]                            Patient states he needs a letter from you to send to the [facility] stating that it is ok for him to continue taking his adderall 20 mg PRN  and that it will not effect his SVT.    I can draft a letter if you are OK with this.    Please advise.    Thank you, Katie

## 2022-03-14 ENCOUNTER — OFFICE VISIT (OUTPATIENT)
Dept: URGENT CARE | Facility: CLINIC | Age: 57
End: 2022-03-14
Payer: OTHER GOVERNMENT

## 2022-03-14 VITALS
OXYGEN SATURATION: 98 % | SYSTOLIC BLOOD PRESSURE: 108 MMHG | BODY MASS INDEX: 27.4 KG/M2 | RESPIRATION RATE: 18 BRPM | DIASTOLIC BLOOD PRESSURE: 69 MMHG | HEIGHT: 69 IN | WEIGHT: 185 LBS | TEMPERATURE: 99 F | HEART RATE: 92 BPM

## 2022-03-14 DIAGNOSIS — R19.7 DIARRHEA, UNSPECIFIED TYPE: ICD-10-CM

## 2022-03-14 DIAGNOSIS — J06.9 VIRAL URI: Primary | ICD-10-CM

## 2022-03-14 DIAGNOSIS — R50.9 FEVER, UNSPECIFIED FEVER CAUSE: ICD-10-CM

## 2022-03-14 DIAGNOSIS — H92.03 OTALGIA OF BOTH EARS: ICD-10-CM

## 2022-03-14 DIAGNOSIS — Z11.52 ENCOUNTER FOR SCREENING LABORATORY TESTING FOR COVID-19 VIRUS: ICD-10-CM

## 2022-03-14 DIAGNOSIS — R53.83 FATIGUE, UNSPECIFIED TYPE: ICD-10-CM

## 2022-03-14 LAB
CTP QC/QA: YES
CTP QC/QA: YES
POC MOLECULAR INFLUENZA A AGN: NEGATIVE
POC MOLECULAR INFLUENZA B AGN: NEGATIVE
SARS-COV-2 RDRP RESP QL NAA+PROBE: NEGATIVE

## 2022-03-14 PROCEDURE — 87502 INFLUENZA DNA AMP PROBE: CPT | Mod: QW,S$GLB,, | Performed by: NURSE PRACTITIONER

## 2022-03-14 PROCEDURE — 87502 POCT INFLUENZA A/B MOLECULAR: ICD-10-PCS | Mod: QW,S$GLB,, | Performed by: NURSE PRACTITIONER

## 2022-03-14 PROCEDURE — U0002: ICD-10-PCS | Mod: QW,S$GLB,, | Performed by: NURSE PRACTITIONER

## 2022-03-14 PROCEDURE — U0002 COVID-19 LAB TEST NON-CDC: HCPCS | Mod: QW,S$GLB,, | Performed by: NURSE PRACTITIONER

## 2022-03-14 PROCEDURE — 99213 OFFICE O/P EST LOW 20 MIN: CPT | Mod: S$GLB,,, | Performed by: NURSE PRACTITIONER

## 2022-03-14 PROCEDURE — 99213 PR OFFICE/OUTPT VISIT, EST, LEVL III, 20-29 MIN: ICD-10-PCS | Mod: S$GLB,,, | Performed by: NURSE PRACTITIONER

## 2022-03-14 RX ORDER — FLUTICASONE PROPIONATE 50 MCG
1 SPRAY, SUSPENSION (ML) NASAL DAILY PRN
Qty: 16 G | Refills: 0 | Status: SHIPPED | OUTPATIENT
Start: 2022-03-14 | End: 2022-06-30

## 2022-03-14 NOTE — PATIENT INSTRUCTIONS
INSTRUCTIONS:  - Rest.  - Drink plenty of fluids.  - Take Tylenol and/or Ibuprofen as directed as needed for fever/pain.  Do not take more than the recommended dose.  - follow up with your PCP within the next 1-2 weeks as needed.  - You must understand that you have received an Urgent Care treatment only and that you may be released before all of your medical problems are known or treated.   - You, the patient, will arrange for follow up care as instructed.   - If your condition worsens or fails to improve we recommend that you receive another evaluation at the ER immediately or contact your PCP to discuss your concerns.   - You can call [phone] or [phone] to help schedule an appointment with the appropriate provider.       OVER THE COUNTER RECOMMENDATIONS/SUGGESTIONS.     Make sure to stay well hydrated.     Use Nasal Saline to mechanically move any post nasal drip from your eustachian tube or from the back of your throat.     Use warm salt water gargles to ease your throat pain. Warm salt water gargles as needed for sore throat-  1/2 tsp salt to 1 cup warm water, gargle as desired.     Use an antihistamine such as Claritin, Zyrtec or Allegra to dry you out.      Use pseudoephedrine (behind the counter) to decongest. Pseudoephedrine  30 mg up to 240 mg /day. It can raise your blood pressure and give you palpitations.     Use mucinex (guaifenisin) to break up mucous up to 2400mg/day to loosen any mucous.   The mucinex DM pill has a cough suppressant that can be sedating. It can be used at night to stop the tickle at the back of your throat.  You can use Mucinex D (it has guaifenesin and a high dose of pseudoephedrine) in the mornings to help decongest.        Use Afrin in each nare for no longer than 3 days, as it is addictive. It can also dry out your mucous membranes and cause elevated blood pressure. This is especially useful if you are flying.     Use Flonase 1-2 sprays/nostril per day. It is a  local acting steroid nasal spray, if you develop a bloody nose, stop using the medication immediately.     Sometimes Nyquil at night is beneficial to help you get some rest, however it is sedating and it does have an antihistamine, and tylenol.     Honey is a natural cough suppressant that can be used.     Tylenol up to 4,000 mg a day is safe for short periods and can be used for body aches, pain, and fever. However in high doses and prolonged use it can cause liver irritation.     Ibuprofen is a non-steroidal anti-inflammatory that can be used for body aches, pain, and fever.However it can also cause stomach irritation if over used.

## 2022-03-14 NOTE — PROGRESS NOTES
"Subjective:       Patient ID: Brice Byrd is a 56 y.o. male.    Vitals:  height is 5' 9" (1.753 m) and weight is 83.9 kg (185 lb). His oral temperature is 98.7 °F (37.1 °C). His blood pressure is 108/69 and his pulse is 92. His respiration is 18 and oxygen saturation is 98%.     Chief Complaint: Sinus Problem    Patient reports chills, nasal congestion/pressure, bilateral ear pain, body aches, fatigue, shortness of breath, sneezing, and headaches for 2 days.  Patient also reports diarrhea since last night.  Patient reports to taking Advil for his discomfort at home.  Denies fever, vomiting, chest pain, dysuria, or abdominal pain.  Patient reports that he is in the  and was out in the woods over the weekend.    Sinus Problem  This is a new problem. The current episode started yesterday. The problem has been gradually worsening since onset. There has been no fever. His pain is at a severity of 9/10. The pain is severe. Associated symptoms include chills, congestion, ear pain, headaches, neck pain, shortness of breath, sinus pressure and sneezing. Pertinent negatives include no coughing or sore throat. Treatments tried: advil. The treatment provided no relief.       Constitution: Positive for chills. Negative for fatigue and fever.   HENT: Positive for ear pain, congestion and sinus pressure. Negative for ear discharge, facial swelling and sore throat.    Neck: Positive for neck pain. Negative for neck stiffness and painful lymph nodes.   Cardiovascular: Negative.  Negative for chest pain and sob on exertion.   Eyes: Negative.  Negative for eye itching, eye pain and eye redness.   Respiratory: Positive for shortness of breath. Negative for chest tightness, cough, wheezing and asthma.    Gastrointestinal: Negative.  Negative for abdominal pain, nausea and vomiting.   Endocrine: negative. excessive thirst.   Genitourinary: Negative.  Negative for dysuria, frequency, urgency and flank pain.   Musculoskeletal: " Negative for pain, trauma, joint pain and joint swelling.   Skin: Negative.  Negative for rash, wound, lesion and hives.   Allergic/Immunologic: Negative.  Positive for sneezing. Negative for eczema, asthma, hives and itching.   Neurological: Positive for headaches. Negative for dizziness, passing out, disorientation and altered mental status.   Hematologic/Lymphatic: Negative.  Negative for swollen lymph nodes.   Psychiatric/Behavioral: Negative.  Negative for altered mental status, disorientation and confusion.       Objective:      Physical Exam   Constitutional: He is oriented to person, place, and time. He appears well-developed. He is cooperative.  Non-toxic appearance. He does not appear ill. No distress.   HENT:   Head: Normocephalic and atraumatic.   Ears:   Right Ear: Hearing, tympanic membrane, external ear and ear canal normal.   Left Ear: Hearing, tympanic membrane, external ear and ear canal normal.   Nose: Rhinorrhea and congestion present. No mucosal edema or nasal deformity. No epistaxis. Right sinus exhibits no maxillary sinus tenderness and no frontal sinus tenderness. Left sinus exhibits no maxillary sinus tenderness and no frontal sinus tenderness.   Mouth/Throat: Uvula is midline, oropharynx is clear and moist and mucous membranes are normal. No trismus in the jaw. Normal dentition. No uvula swelling. No oropharyngeal exudate, posterior oropharyngeal edema or posterior oropharyngeal erythema.   Eyes: Conjunctivae and lids are normal. No scleral icterus.   Neck: Trachea normal and phonation normal. Neck supple. No edema present. No erythema present. No neck rigidity present.   Cardiovascular: Normal rate, regular rhythm, normal heart sounds and normal pulses.   Pulmonary/Chest: Effort normal and breath sounds normal. No stridor. No respiratory distress. He has no decreased breath sounds. He has no wheezes. He has no rhonchi. He has no rales. He exhibits no tenderness.   Abdominal: Normal  appearance. Soft. flat abdomenThere is no abdominal tenderness. There is no guarding, no left CVA tenderness and no right CVA tenderness.   Musculoskeletal: Normal range of motion.         General: No deformity. Normal range of motion.   Lymphadenopathy:     He has no cervical adenopathy.   Neurological: no focal deficit. He is alert and oriented to person, place, and time. He exhibits normal muscle tone. Coordination normal.   Skin: Skin is warm, dry, intact, not diaphoretic and not pale.   Psychiatric: His speech is normal and behavior is normal. Mood, judgment and thought content normal.   Nursing note and vitals reviewed.     The following results have been reviewed with the patient:  LABS-  Results for orders placed or performed in visit on 03/14/22   POCT COVID-19 Rapid Screening   Result Value Ref Range    POC Rapid COVID Negative Negative     Acceptable Yes    POCT Influenza A/B Molecular   Result Value Ref Range    POC Molecular Influenza A Ag Negative Negative, Not Reported    POC Molecular Influenza B Ag Negative Negative, Not Reported     Acceptable Yes           Assessment:       1. Viral URI    2. Fever, unspecified fever cause    3. Encounter for screening laboratory testing for COVID-19 virus    4. Otalgia of both ears    5. Fatigue, unspecified type    6. Diarrhea, unspecified type          Plan:       FOLLOWUP  Follow up if symptoms worsen or fail to improve, for PLEASE CONTACT PCP OR CONTACT THE EMERGENCY ROOM..     PATIENT INSTRUCTIONS  Patient Instructions   INSTRUCTIONS:  - Rest.  - Drink plenty of fluids.  - Take Tylenol and/or Ibuprofen as directed as needed for fever/pain.  Do not take more than the recommended dose.  - follow up with your PCP within the next 1-2 weeks as needed.  - You must understand that you have received an Urgent Care treatment only and that you may be released before all of your medical problems are known or treated.   - You, the patient,  will arrange for follow up care as instructed.   - If your condition worsens or fails to improve we recommend that you receive another evaluation at the ER immediately or contact your PCP to discuss your concerns.   - You can call [phone] or [phone] to help schedule an appointment with the appropriate provider.       OVER THE COUNTER RECOMMENDATIONS/SUGGESTIONS.     Make sure to stay well hydrated.     Use Nasal Saline to mechanically move any post nasal drip from your eustachian tube or from the back of your throat.     Use warm salt water gargles to ease your throat pain. Warm salt water gargles as needed for sore throat-  1/2 tsp salt to 1 cup warm water, gargle as desired.     Use an antihistamine such as Claritin, Zyrtec or Allegra to dry you out.      Use pseudoephedrine (behind the counter) to decongest. Pseudoephedrine  30 mg up to 240 mg /day. It can raise your blood pressure and give you palpitations.     Use mucinex (guaifenisin) to break up mucous up to 2400mg/day to loosen any mucous.   The mucinex DM pill has a cough suppressant that can be sedating. It can be used at night to stop the tickle at the back of your throat.  You can use Mucinex D (it has guaifenesin and a high dose of pseudoephedrine) in the mornings to help decongest.        Use Afrin in each nare for no longer than 3 days, as it is addictive. It can also dry out your mucous membranes and cause elevated blood pressure. This is especially useful if you are flying.     Use Flonase 1-2 sprays/nostril per day. It is a local acting steroid nasal spray, if you develop a bloody nose, stop using the medication immediately.     Sometimes Nyquil at night is beneficial to help you get some rest, however it is sedating and it does have an antihistamine, and tylenol.     Honey is a natural cough suppressant that can be used.     Tylenol up to 4,000 mg a day is safe for short periods and can be used for body aches, pain, and fever.  However in high doses and prolonged use it can cause liver irritation.     Ibuprofen is a non-steroidal anti-inflammatory that can be used for body aches, pain, and fever.However it can also cause stomach irritation if over used.           THANK YOU FOR ALLOWING ME TO PARTICIPATE IN YOUR HEALTHCARE,     Al Esparza, NP   Viral URI  -     fluticasone propionate (FLONASE) 50 mcg/actuation nasal spray; 1 spray (50 mcg total) by Each Nostril route daily as needed for Rhinitis.  Dispense: 16 g; Refill: 0    Fever, unspecified fever cause  -     POCT Influenza A/B Molecular    Encounter for screening laboratory testing for COVID-19 virus  -     POCT COVID-19 Rapid Screening    Otalgia of both ears    Fatigue, unspecified type    Diarrhea, unspecified type
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
## 2022-03-31 ENCOUNTER — OFFICE VISIT (OUTPATIENT)
Dept: PSYCHIATRY | Facility: CLINIC | Age: 57
End: 2022-03-31
Payer: OTHER GOVERNMENT

## 2022-03-31 VITALS
BODY MASS INDEX: 27.36 KG/M2 | WEIGHT: 184.75 LBS | SYSTOLIC BLOOD PRESSURE: 123 MMHG | HEART RATE: 91 BPM | DIASTOLIC BLOOD PRESSURE: 85 MMHG | HEIGHT: 69 IN

## 2022-03-31 DIAGNOSIS — F41.1 GENERALIZED ANXIETY DISORDER: ICD-10-CM

## 2022-03-31 DIAGNOSIS — F32.A DEPRESSION, UNSPECIFIED DEPRESSION TYPE: ICD-10-CM

## 2022-03-31 DIAGNOSIS — F43.10 PTSD (POST-TRAUMATIC STRESS DISORDER): Primary | ICD-10-CM

## 2022-03-31 PROCEDURE — 90792 PR PSYCHIATRIC DIAGNOSTIC EVALUATION W/MEDICAL SERVICES: ICD-10-PCS | Mod: ,,, | Performed by: NURSE PRACTITIONER

## 2022-03-31 PROCEDURE — 99999 PR PBB SHADOW E&M-EST. PATIENT-LVL III: ICD-10-PCS | Mod: PBBFAC,,, | Performed by: NURSE PRACTITIONER

## 2022-03-31 PROCEDURE — 99999 PR PBB SHADOW E&M-EST. PATIENT-LVL III: CPT | Mod: PBBFAC,,, | Performed by: NURSE PRACTITIONER

## 2022-03-31 PROCEDURE — 99213 OFFICE O/P EST LOW 20 MIN: CPT | Mod: PBBFAC | Performed by: NURSE PRACTITIONER

## 2022-03-31 PROCEDURE — 90792 PSYCH DIAG EVAL W/MED SRVCS: CPT | Mod: ,,, | Performed by: NURSE PRACTITIONER

## 2022-05-17 PROBLEM — Z86.79 STATUS POST CATHETER ABLATION OF SLOW PATHWAY: Status: ACTIVE | Noted: 2022-05-17

## 2022-05-17 PROBLEM — Z98.890 STATUS POST CATHETER ABLATION OF SLOW PATHWAY: Status: ACTIVE | Noted: 2022-05-17

## 2022-05-17 NOTE — PROGRESS NOTES
"Mr. Byrd is a patient of Dr. Guerra and was last seen in clinic 1/26/2022.      Subjective:   Patient ID:  Brice Byrd is a 56 y.o. male who presents for follow-up of SVT  .     HPI:    Mr. Byrd is a 56 y.o. male with SVT, syncope, HTN here for follow up after ablation.     Background:    56 yoM here for SVT management. He has history of adenosine sensitive SVT with rates in the 180s/190s. Recently he had a syncopal episode that is consistent with a vagal spell. He had a prodrome with dizziness. He got to a chair and had LOC about 30s according to family. No prior or subsequent events. Normal EF on echo and SVT noted on event monitor. He takes adderall.  56 yoM here for SVT management. He has recurrent, adenosine sensitive SVT. He also had a recent syncopal spell. I suspect vasovagal episode for his syncope. I recommend EPS/RFA for definitive therapy. He wishes to consider his options. [facility]m    Update (05/26/2022):    2/18/2022:  · Induction of typical AVNRT  · SVT ablation (slow pathway modification).    Today he says he feels great! "Haven't felt this good in years." No cardiac complaints. Mr. Byrd reports no chest pain with exertion or at rest, palpitations, SOB, BURDEN, dizziness, or syncope.    I have personally reviewed the patient's EKG today, which shows sinus rhythm at 74bpm. SC interval is 140. QRS is 96. QTc is 410.    Relevant Cardiac Test Results:    2D Echo (11/23/2021):  · The left ventricle is normal in size with normal systolic function. The estimated ejection fraction is 60%.  · Normal right ventricular size with normal right ventricular systolic function.  · Normal left ventricular diastolic function.  · The estimated PA systolic pressure is 21 mmHg.  · Normal central venous pressure (3 mmHg).    Current Outpatient Medications   Medication Sig    chlorthalidone (HYGROTEN) 25 MG Tab Take 0.5 tablets (12.5 mg total) by mouth once daily.    COVID-19 vacc,mRNA,Moderna,/PF (MODERNA COVID-19 " "VACCINE, EUA, IM) MODERNA COVID-19 VACCINE (EUA) (COVID-19 vaccine, mRNA, [id number], LNP-S (Moderna)/PF), 100MCG/0.5,  Start Date: 12/12/21  Status: Ordered    dextroamphetamine-amphetamine (ADDERALL XR) 20 MG 24 hr capsule Take 20 mg by mouth as needed.    flu vacc [id number] 6mos up,PF, (FLUARIX QUAD 1510-5900, PF,) 60 mcg (15 mcg x 4)/0.5 mL Syrg FLUARIX QUAD [id number] (influenza virus vaccine quadrival 5780-8435(6 mos and up)/PF), 60MCG/.5ML,  Start Date: 10/13/21  Status: Ordered    telmisartan (MICARDIS) 80 MG Tab Take 1 tablet (80 mg total) by mouth once daily.    fluticasone propionate (FLONASE) 50 mcg/actuation nasal spray 1 spray (50 mcg total) by Each Nostril route daily as needed for Rhinitis.     No current facility-administered medications for this visit.     Facility-Administered Medications Ordered in Other Visits   Medication    sodium chloride 0.9% bolus 1,000 mL       Review of Systems   Constitutional: Negative for malaise/fatigue.   Cardiovascular: Negative for chest pain, dyspnea on exertion, irregular heartbeat, leg swelling and palpitations.   Respiratory: Negative for shortness of breath.    Hematologic/Lymphatic: Negative for bleeding problem.   Skin: Negative for rash.   Musculoskeletal: Negative for myalgias.   Gastrointestinal: Negative for hematemesis, hematochezia and nausea.   Genitourinary: Negative for hematuria.   Neurological: Negative for light-headedness.   Psychiatric/Behavioral: Negative for altered mental status.   Allergic/Immunologic: Negative for persistent infections.       Objective:          /82   Pulse 74   Ht 5' 9" (1.753 m)   Wt 82.7 kg (182 lb 5.1 oz)   BMI 26.92 kg/m²     Physical Exam  Vitals and nursing note reviewed.   Constitutional:       Appearance: Normal appearance. He is well-developed.   HENT:      Head: Normocephalic.      Nose: Nose normal.   Eyes:      Pupils: Pupils are equal, round, and reactive to light.   Cardiovascular:      Rate and " Rhythm: Normal rate and regular rhythm.   Pulmonary:      Effort: No respiratory distress.      Breath sounds: Normal breath sounds.   Musculoskeletal:         General: Normal range of motion.   Skin:     General: Skin is warm and dry.      Findings: No erythema.   Neurological:      Mental Status: He is alert and oriented to person, place, and time.   Psychiatric:         Speech: Speech normal.         Behavior: Behavior normal.           Lab Results   Component Value Date     02/15/2022    K 4.2 02/15/2022    MG 2.0 11/22/2021    BUN 17 02/15/2022    CREATININE 1.0 02/15/2022     (H) 11/23/2021    AST 65 (H) 11/23/2021    HGB 14.9 02/15/2022    HCT 44.8 02/15/2022    TSH 2.419 11/23/2021    LDLCALC 215.4 (H) 02/15/2022       Recent Labs   Lab 11/23/21  0548 02/15/22  0800   INR 0.9 0.9       Assessment:     1. Status post catheter ablation of slow pathway    2. SVT (supraventricular tachycardia)    3. Primary hypertension    4. Encounter to establish care      Plan:     In summary, Mr. Byrd is a 56 y.o. male with SVT, syncope here for follow up after ablation.   He is 3 months s/p slow pathway modification. He is doing well from a rhythm standpoint, with no documented or symptomatic recurrence of arrhythmia since procedure.  Feels great. Retiring soon and would like to establish care with PCP. Referral placed.    Internal medicine referral  RTC PRN    *A copy of this note has been sent to Dr. Guerra*    Follow up if symptoms worsen or fail to improve.    ------------------------------------------------------------------    JINNY Sloan, NP-C  Cardiac Electrophysiology

## 2022-05-26 ENCOUNTER — HOSPITAL ENCOUNTER (OUTPATIENT)
Dept: CARDIOLOGY | Facility: CLINIC | Age: 57
Discharge: HOME | End: 2022-05-26
Payer: OTHER GOVERNMENT

## 2022-05-26 ENCOUNTER — OFFICE VISIT (OUTPATIENT)
Dept: ELECTROPHYSIOLOGY | Facility: CLINIC | Age: 57
End: 2022-05-26
Payer: OTHER GOVERNMENT

## 2022-05-26 VITALS
SYSTOLIC BLOOD PRESSURE: 122 MMHG | WEIGHT: 182.31 LBS | BODY MASS INDEX: 27 KG/M2 | HEIGHT: 69 IN | DIASTOLIC BLOOD PRESSURE: 82 MMHG | HEART RATE: 74 BPM

## 2022-05-26 DIAGNOSIS — I47.10 SVT (SUPRAVENTRICULAR TACHYCARDIA): ICD-10-CM

## 2022-05-26 DIAGNOSIS — I10 PRIMARY HYPERTENSION: ICD-10-CM

## 2022-05-26 DIAGNOSIS — Z98.890 STATUS POST CATHETER ABLATION OF SLOW PATHWAY: Primary | ICD-10-CM

## 2022-05-26 DIAGNOSIS — Z86.79 STATUS POST CATHETER ABLATION OF SLOW PATHWAY: Primary | ICD-10-CM

## 2022-05-26 DIAGNOSIS — Z76.89 ENCOUNTER TO ESTABLISH CARE: ICD-10-CM

## 2022-05-26 PROCEDURE — 99214 PR OFFICE/OUTPT VISIT, EST, LEVL IV, 30-39 MIN: ICD-10-PCS | Mod: S$PBB,,, | Performed by: NURSE PRACTITIONER

## 2022-05-26 PROCEDURE — 99999 PR PBB SHADOW E&M-EST. PATIENT-LVL III: CPT | Mod: PBBFAC,,, | Performed by: NURSE PRACTITIONER

## 2022-05-26 PROCEDURE — 99999 PR PBB SHADOW E&M-EST. PATIENT-LVL III: ICD-10-PCS | Mod: PBBFAC,,, | Performed by: NURSE PRACTITIONER

## 2022-05-26 PROCEDURE — 93010 RHYTHM STRIP: ICD-10-PCS | Mod: S$PBB,,, | Performed by: INTERNAL MEDICINE

## 2022-05-26 PROCEDURE — 93005 ELECTROCARDIOGRAM TRACING: CPT | Mod: PBBFAC | Performed by: INTERNAL MEDICINE

## 2022-05-26 PROCEDURE — 99214 OFFICE O/P EST MOD 30 MIN: CPT | Mod: S$PBB,,, | Performed by: NURSE PRACTITIONER

## 2022-05-26 PROCEDURE — 93010 ELECTROCARDIOGRAM REPORT: CPT | Mod: S$PBB,,, | Performed by: INTERNAL MEDICINE

## 2022-05-26 PROCEDURE — 99213 OFFICE O/P EST LOW 20 MIN: CPT | Mod: PBBFAC | Performed by: NURSE PRACTITIONER

## 2022-05-26 RX ORDER — INFLUENZA VIRUS VACCINE 15; 15; 15; 15 UG/.5ML; UG/.5ML; UG/.5ML; UG/.5ML
SUSPENSION INTRAMUSCULAR
COMMUNITY
Start: 2021-10-13 | End: 2022-05-26

## 2022-06-15 ENCOUNTER — OFFICE VISIT (OUTPATIENT)
Dept: INTERNAL MEDICINE | Facility: CLINIC | Age: 57
End: 2022-06-15
Payer: OTHER GOVERNMENT

## 2022-06-15 VITALS
HEART RATE: 84 BPM | HEIGHT: 69 IN | SYSTOLIC BLOOD PRESSURE: 122 MMHG | DIASTOLIC BLOOD PRESSURE: 84 MMHG | BODY MASS INDEX: 26.97 KG/M2 | OXYGEN SATURATION: 99 % | WEIGHT: 182.13 LBS

## 2022-06-15 DIAGNOSIS — Z98.890 STATUS POST CATHETER ABLATION OF SLOW PATHWAY: ICD-10-CM

## 2022-06-15 DIAGNOSIS — E78.2 MIXED HYPERLIPIDEMIA: ICD-10-CM

## 2022-06-15 DIAGNOSIS — F90.2 ATTENTION DEFICIT HYPERACTIVITY DISORDER (ADHD), COMBINED TYPE: ICD-10-CM

## 2022-06-15 DIAGNOSIS — Z86.79 STATUS POST CATHETER ABLATION OF SLOW PATHWAY: ICD-10-CM

## 2022-06-15 DIAGNOSIS — Z76.89 ESTABLISHING CARE WITH NEW DOCTOR, ENCOUNTER FOR: Primary | ICD-10-CM

## 2022-06-15 DIAGNOSIS — I47.10 SVT (SUPRAVENTRICULAR TACHYCARDIA): ICD-10-CM

## 2022-06-15 DIAGNOSIS — R74.01 TRANSAMINITIS: ICD-10-CM

## 2022-06-15 DIAGNOSIS — I10 PRIMARY HYPERTENSION: ICD-10-CM

## 2022-06-15 DIAGNOSIS — Z76.89 ENCOUNTER TO ESTABLISH CARE: ICD-10-CM

## 2022-06-15 PROBLEM — K75.81 NONALCOHOLIC STEATOHEPATITIS: Status: ACTIVE | Noted: 2022-06-15

## 2022-06-15 PROBLEM — H40.013 OPEN ANGLE WITH BORDERLINE FINDINGS, LOW RISK, BILATERAL: Status: ACTIVE | Noted: 2018-12-03

## 2022-06-15 PROCEDURE — 99205 OFFICE O/P NEW HI 60 MIN: CPT | Mod: S$PBB,,, | Performed by: FAMILY MEDICINE

## 2022-06-15 PROCEDURE — 99214 OFFICE O/P EST MOD 30 MIN: CPT | Mod: PBBFAC | Performed by: FAMILY MEDICINE

## 2022-06-15 PROCEDURE — 99999 PR PBB SHADOW E&M-EST. PATIENT-LVL IV: ICD-10-PCS | Mod: PBBFAC,,, | Performed by: FAMILY MEDICINE

## 2022-06-15 PROCEDURE — 99205 PR OFFICE/OUTPT VISIT, NEW, LEVL V, 60-74 MIN: ICD-10-PCS | Mod: S$PBB,,, | Performed by: FAMILY MEDICINE

## 2022-06-15 PROCEDURE — 99999 PR PBB SHADOW E&M-EST. PATIENT-LVL IV: CPT | Mod: PBBFAC,,, | Performed by: FAMILY MEDICINE

## 2022-06-15 RX ORDER — DEXTROAMPHETAMINE SACCHARATE, AMPHETAMINE ASPARTATE MONOHYDRATE, DEXTROAMPHETAMINE SULFATE AND AMPHETAMINE SULFATE 2.5; 2.5; 2.5; 2.5 MG/1; MG/1; MG/1; MG/1
CAPSULE, EXTENDED RELEASE ORAL
Qty: 30 CAPSULE | Refills: 0 | Status: SHIPPED | OUTPATIENT
Start: 2022-08-15 | End: 2022-09-14

## 2022-06-15 RX ORDER — DEXTROAMPHETAMINE SACCHARATE, AMPHETAMINE ASPARTATE MONOHYDRATE, DEXTROAMPHETAMINE SULFATE AND AMPHETAMINE SULFATE 5; 5; 5; 5 MG/1; MG/1; MG/1; MG/1
20 CAPSULE, EXTENDED RELEASE ORAL EVERY MORNING
Qty: 30 CAPSULE | Refills: 0 | Status: SHIPPED | OUTPATIENT
Start: 2022-08-15 | End: 2022-09-14

## 2022-06-15 RX ORDER — DEXTROAMPHETAMINE SACCHARATE, AMPHETAMINE ASPARTATE MONOHYDRATE, DEXTROAMPHETAMINE SULFATE AND AMPHETAMINE SULFATE 2.5; 2.5; 2.5; 2.5 MG/1; MG/1; MG/1; MG/1
CAPSULE, EXTENDED RELEASE ORAL
Qty: 30 CAPSULE | Refills: 0 | Status: SHIPPED | OUTPATIENT
Start: 2022-07-15 | End: 2022-06-30

## 2022-06-15 RX ORDER — CHLORTHALIDONE 25 MG/1
12.5 TABLET ORAL DAILY
Qty: 45 TABLET | Refills: 0 | Status: SHIPPED | OUTPATIENT
Start: 2022-06-15 | End: 2022-09-18

## 2022-06-15 RX ORDER — DEXTROAMPHETAMINE SACCHARATE, AMPHETAMINE ASPARTATE MONOHYDRATE, DEXTROAMPHETAMINE SULFATE AND AMPHETAMINE SULFATE 5; 5; 5; 5 MG/1; MG/1; MG/1; MG/1
20 CAPSULE, EXTENDED RELEASE ORAL EVERY MORNING
Qty: 30 CAPSULE | Refills: 0 | Status: SHIPPED | OUTPATIENT
Start: 2022-07-15 | End: 2022-06-30

## 2022-06-15 RX ORDER — TELMISARTAN 80 MG/1
80 TABLET ORAL DAILY
Qty: 90 TABLET | Refills: 3 | Status: SHIPPED | OUTPATIENT
Start: 2022-06-15 | End: 2023-03-01 | Stop reason: SDUPTHER

## 2022-06-15 NOTE — PROGRESS NOTES
"Subjective:       Patient ID: Brice Byrd is a 56 y.o. male.    Chief Complaint: Establish Care    Patient is here to [facility]  Retired from  recently  In his 40s had extensive testing and Dx ADHD and on adderall 20 AM, 10 afternoon with excellent results on thinking and QoL, has about 30d left on his terminal Rx form [employer]. So will give him 30d Rx to fill on 7/15 and another for 8/15. Then will see him on 9/15 and q 3 mo thereafter.  There is a 2/24/22 letter from [facility] saying:   "Mr. Brice Byrd is a patient of [facility] and his physician Dr. Al Guerra is aware that he takes Adderall 20 mg as needed and notes that this medication will not affect his SVT heart condition."   empty as he got this form [employer]    Major medical issues that need addressing are:  1. Elevated LFTs, fatty liver - will refer to hepatology. We discussed perhaps 1% eventually goes to liver cirrhosis/cancer needing transplant so important to be followed by hepatology. Hepatic impairment may also decrease adderall elimination, though his adderall dose is about half the maximum in a day so perhaps not an immediate issue unless hepatologist thinks differently.    2. Elevated EFE=463 on 2/15/22. should be on a statin drug but he said [employer] tried over and over, he could never tolerate them. We discussed he should see cardiology to consider repatha. In addition best if cardiology evaluates his cardiac risk with adderal and consider if he needs a CCS or stress echo or pet to assure he can safely continue stimulants as he ages. We also discussed that when he turns 65 in 9 more years the stimulants are generally not continued, and he should should consider either tapering off years before that or visit with psychiatry to consider alternatives or if a psychiatrist wanted to continue past 65 under their supervision. He has a particular cardiologist he is interested in seeing but forgets his name    3. ?Glaucoma " on DOD list as of 12/3/2018 listed as borderline, low risk, but not on any medications for it - he wants to hold off on an eye doc referral till his new eye insurance kicks in - pending eye doc evaluation this could be a eventual contraindication for adderall. DOD felt it was not a problem and they continued it.      Social History     Tobacco Use    Smoking status: Former Smoker    Smokeless tobacco: Never Used   Substance Use Topics    Alcohol use: Never    Drug use: Never       No family history on file.    Past Surgical History:   Procedure Laterality Date    ABLATION N/A 2/18/2022    Procedure: Ablation;  Surgeon: Al Guerra MD;  Location: [facility];  Service: Cardiology;  Laterality: N/A;  SVT, RFA, JERONIMO, anes, MB, 3prep    VASECTOMY         Patient Active Problem List   Diagnosis    Transaminitis    Syncope and collapse    Primary hypertension    SVT (supraventricular tachycardia)    Mixed hyperlipidemia    Status post catheter ablation of slow pathway    Attention deficit hyperactivity disorder (ADHD), combined type    Nonalcoholic steatohepatitis       Current Outpatient Medications on File Prior to Visit   Medication Sig Dispense Refill    dextroamphetamine-amphetamine (ADDERALL XR) 20 MG 24 hr capsule Take 20 mg by mouth as needed.      fluticasone propionate (FLONASE) 50 mcg/actuation nasal spray 1 spray (50 mcg total) by Each Nostril route daily as needed for Rhinitis. 16 g 0    chlorthalidone (HYGROTEN) 25 MG Tab Take 0.5 tablets (12.5 mg total) by mouth once daily. 15 tablet 3    telmisartan (MICARDIS) 80 MG Tab Take 1 tablet (80 mg total) by mouth once daily. 90 tablet 3     Current Facility-Administered Medications on File Prior to Visit   Medication Dose Route Frequency Provider Last Rate Last Admin    sodium chloride 0.9% bolus 1,000 mL  1,000 mL Intravenous Once Li Stephen NP               Review of Systems   Constitutional: Negative for chills and fever.   HENT:  "Negative for ear pain.    Eyes: Negative for pain.   Respiratory: Negative for chest tightness.    Cardiovascular: Negative for chest pain.   Gastrointestinal: Negative for abdominal pain.   Genitourinary: Negative for flank pain.   Musculoskeletal: Negative for gait problem.   Neurological: Negative for syncope.   Psychiatric/Behavioral: Negative for behavioral problems.       Objective:     /84 (BP Location: Left arm, Patient Position: Sitting, BP Method: Large (Manual))   Pulse 84   Ht 5' 9" (1.753 m)   Wt 82.6 kg (182 lb 1.6 oz)   SpO2 99%   BMI 26.89 kg/m²     Physical Exam  Constitutional:       Appearance: He is well-developed.   HENT:      Head: Normocephalic and atraumatic.   Cardiovascular:      Rate and Rhythm: Normal rate.      Heart sounds: Normal heart sounds.   Pulmonary:      Effort: No respiratory distress.      Breath sounds: Normal breath sounds. No wheezing or rales.   Abdominal:      Palpations: Abdomen is soft.   Musculoskeletal:      Cervical back: Neck supple.   Skin:     General: Skin is dry.   Neurological:      Mental Status: He is alert.   Psychiatric:         Behavior: Behavior normal.         Assessment:       1. Establishing care with new doctor, encounter for    2. Encounter to establish care    3. Primary hypertension    4. Mixed hyperlipidemia    5. SVT (supraventricular tachycardia)    6. Status post catheter ablation of slow pathway    7. Transaminitis    8. Attention deficit hyperactivity disorder (ADHD), combined type        Plan:       Brice was seen today for establish care.    Diagnoses and all orders for this visit:    Establishing care with new doctor, encounter for    Encounter to establish care  -     Ambulatory referral/consult to Internal Medicine  -     CBC Auto Differential; Future  -     Comprehensive Metabolic Panel; Future  -     PSA, Screening; Future  -     LDL Cholesterol, Direct Measurement; Future    Primary hypertension  -     CBC Auto " Differential; Future  -     Comprehensive Metabolic Panel; Future  -     LDL Cholesterol, Direct Measurement; Future  -     telmisartan (MICARDIS) 80 MG Tab; Take 1 tablet (80 mg total) by mouth once daily.  -     chlorthalidone (HYGROTEN) 25 MG Tab; Take 0.5 tablets (12.5 mg total) by mouth once daily.    Mixed hyperlipidemia  -     LDL Cholesterol, Direct Measurement; Future  -should see cardiology for Elevated TBA=007 on 2/15/22. should be on a statin drug but he said  tried over and over, he could never tolerate them. We discussed he should see cardiology to consider repatha. In addition best if cardiology evaluates his cardiac risk with adderal and consider if he needs a CCS or stress echo or pet to assure he can safely continue stimulants as he ages. We also discussed that when he turns 65 in 9 more years the stimulants are generally not continued, and he should should consider either tapering off years before that or visit with psychiatry to consider alternatives or if a psychiatrist wanted to continue past 65 under their supervision. He has a particular cardiologist he is interested in seeing but forgets his name    SVT (supraventricular tachycardia)  Status post catheter ablation of slow pathway  -followed by [facility]    Transaminitis  -     Ambulatory referral/consult to Hepatology; Future  -     Comprehensive Metabolic Panel; Future    Attention deficit hyperactivity disorder (ADHD), combined type  -     dextroamphetamine-amphetamine (ADDERALL XR) 20 MG 24 hr capsule; Take 1 capsule (20 mg total) by mouth every morning.  -     dextroamphetamine-amphetamine (ADDERALL XR) 20 MG 24 hr capsule; Take 1 capsule (20 mg total) by mouth every morning.  -     dextroamphetamine-amphetamine (ADDERALL XR) 10 MG 24 hr capsule; 1 capsule every afternoon  -     dextroamphetamine-amphetamine (ADDERALL XR) 10 MG 24 hr capsule; 1 capsule every afternoon  has about 30d left on his terminal Rx form [employer]. So will give  him 30d Rx to fill on 7/15 and another for 8/15. Then will see him on 9/15 and q 3 mo thereafter.    Follow up in about 13 weeks (around 9/14/2022) for adhd.  60 min spent in this visit 90% of which was in discussion with pt about all the above and documenting it

## 2022-06-20 ENCOUNTER — PATIENT MESSAGE (OUTPATIENT)
Dept: ADMINISTRATIVE | Facility: HOSPITAL | Age: 57
End: 2022-06-20
Payer: OTHER GOVERNMENT

## 2022-06-22 ENCOUNTER — OFFICE VISIT (OUTPATIENT)
Dept: PSYCHIATRY | Facility: CLINIC | Age: 57
End: 2022-06-22
Payer: OTHER GOVERNMENT

## 2022-06-22 VITALS
DIASTOLIC BLOOD PRESSURE: 95 MMHG | WEIGHT: 185.19 LBS | SYSTOLIC BLOOD PRESSURE: 126 MMHG | HEART RATE: 92 BPM | BODY MASS INDEX: 27.35 KG/M2

## 2022-06-22 DIAGNOSIS — Z63.4 GRIEF AT LOSS OF CHILD: Primary | ICD-10-CM

## 2022-06-22 DIAGNOSIS — F43.21 GRIEF AT LOSS OF CHILD: Primary | ICD-10-CM

## 2022-06-22 PROCEDURE — 99999 PR PBB SHADOW E&M-EST. PATIENT-LVL III: CPT | Mod: PBBFAC,,, | Performed by: NURSE PRACTITIONER

## 2022-06-22 PROCEDURE — 99999 PR PBB SHADOW E&M-EST. PATIENT-LVL III: ICD-10-PCS | Mod: PBBFAC,,, | Performed by: NURSE PRACTITIONER

## 2022-06-22 PROCEDURE — 99213 PR OFFICE/OUTPT VISIT, EST, LEVL III, 20-29 MIN: ICD-10-PCS | Mod: S$PBB,,, | Performed by: NURSE PRACTITIONER

## 2022-06-22 PROCEDURE — 99213 OFFICE O/P EST LOW 20 MIN: CPT | Mod: S$PBB,,, | Performed by: NURSE PRACTITIONER

## 2022-06-22 PROCEDURE — 90833 PR PSYCHOTHERAPY W/PATIENT W/E&M, 30 MIN (ADD ON): ICD-10-PCS | Mod: S$PBB,,, | Performed by: NURSE PRACTITIONER

## 2022-06-22 PROCEDURE — 90833 PSYTX W PT W E/M 30 MIN: CPT | Mod: S$PBB,,, | Performed by: NURSE PRACTITIONER

## 2022-06-22 PROCEDURE — 99213 OFFICE O/P EST LOW 20 MIN: CPT | Mod: PBBFAC | Performed by: NURSE PRACTITIONER

## 2022-06-22 SDOH — SOCIAL DETERMINANTS OF HEALTH (SDOH): DISSAPEARANCE AND DEATH OF FAMILY MEMBER: Z63.4

## 2022-06-22 NOTE — PROGRESS NOTES
6/22/2022   Brice Byrd  1965  [id number]    Outpatient Psychiatry Follow-Up Visit (MD/NP)         Chief Complaint:  Brice Byrd, a 56 y.o. male,who presents today for follow up of grief.  Met with patient.        Interval History/Subjective Report/Content of Current Session:       Today the pt reports he thought he was here today for a psychotherapy session. States his appt with the LCSW was cancelled and he was rescheduled with me. States he is not interested in medication. Reports his daughter had a still birth at 7 months on June 11th. They had all just gotten back from a trip to [place]. He and his family have been grieving this. He for a referral for counseling for her. I gave him info on [facility] and [facility]. I also provided him with names of some of the therapists here.     Pt denies recurrent thoughts of death and denies any suicidal or homicidal plans or intentions.     Denies any sxs of linda or hypomania. Denies AVH, paranoia and delusions. No objective s/sx of psychosis or linda.       Psychotherapy:  · Target symptoms: grief  · Why chosen therapy is appropriate versus another modality: relevant to diagnosis, patient responds to this modality  · Outcome monitoring methods: self-report, observation  · Therapeutic intervention type: supportive psychotherapy  · Topics discussed/themes: illness/death of a loved one, building skills sets for symptom management  · The patient's response to the intervention is accepting. The patient's progress toward treatment goals is good.   · Duration of intervention: 22 minutes.            Review of Systems       Review of Systems   Constitutional: Negative for chills, fever and malaise/fatigue.   Respiratory: Negative for cough and shortness of breath.    Cardiovascular: Negative for chest pain and palpitations.   Gastrointestinal: Negative for abdominal pain, diarrhea and vomiting.   Genitourinary: Negative for dysuria and hematuria.    Musculoskeletal: Negative for falls and myalgias.   Skin: Negative for rash.   Neurological: Negative for tremors, seizures and headaches.   Psychiatric/Behavioral:        See HPI         Past Medical, Family and Social History: The patient's past medical, family and social history, allergies, current medications, past surgical history, and problem list have been reviewed and updated as appropriate within the electronic medical record.    Compliance: n/a    Side effects: n/a    Risk Parameters:  Patient reports no suicidal ideation  Patient reports no homicidal ideation  Patient reports no self-injurious behavior  Patient reports no violent behavior    Exam (detailed: at least 9 elements; comprehensive: all 15 elements)   Constitutional  Vitals:  Most recent vital signs, dated less than 90 days prior to this appointment, were reviewed.   Vitals:    06/22/22 1455   BP: (!) 126/95   Pulse: 92   Weight: 84 kg (185 lb 3 oz)        General:  unremarkable, age appropriate, well nourished, casually dressed, neatly groomed, overweight       Musculoskeletal  Muscle Strength/Tone:  no dyskinesia, no dystonia, no tremor, no tic   Gait & Station:  non-ataxic     Psychiatric      Appearance:  unremarkable, age appropriate, well nourished, casually dressed, neatly groomed, overweight   Behavior:  normal, friendly and cooperative, eye contact normal     Speech:  no latency; no press   Mood & Affect:  dysthymic  congruent and appropriate   Thought Process:  normal and logical   Associations:  intact   Thought Content:  normal, no suicidality, no homicidality, delusions, or paranoia   Insight:  intact   Judgement: behavior is adequate to circumstances, age appropriate   Orientation:  grossly intact   Memory: intact for content of interview   Language: grossly intact   Attention Span & Concentration:  able to focus   Fund of Knowledge:  intact and appropriate to age and level of education     Medications:  Outpatient Encounter  Medications as of 6/22/2022   Medication Sig Dispense Refill    chlorthalidone (HYGROTEN) 25 MG Tab Take 0.5 tablets (12.5 mg total) by mouth once daily. 45 tablet 0    [START ON 7/15/2022] dextroamphetamine-amphetamine (ADDERALL XR) 10 MG 24 hr capsule 1 capsule every afternoon 30 capsule 0    [START ON 8/15/2022] dextroamphetamine-amphetamine (ADDERALL XR) 10 MG 24 hr capsule 1 capsule every afternoon 30 capsule 0    [START ON 7/15/2022] dextroamphetamine-amphetamine (ADDERALL XR) 20 MG 24 hr capsule Take 1 capsule (20 mg total) by mouth every morning. 30 capsule 0    [START ON 8/15/2022] dextroamphetamine-amphetamine (ADDERALL XR) 20 MG 24 hr capsule Take 1 capsule (20 mg total) by mouth every morning. 30 capsule 0    fluticasone propionate (FLONASE) 50 mcg/actuation nasal spray 1 spray (50 mcg total) by Each Nostril route daily as needed for Rhinitis. 16 g 0    telmisartan (MICARDIS) 80 MG Tab Take 1 tablet (80 mg total) by mouth once daily. 90 tablet 3     Facility-Administered Encounter Medications as of 6/22/2022   Medication Dose Route Frequency Provider Last Rate Last Admin    sodium chloride 0.9% bolus 1,000 mL  1,000 mL Intravenous Once Li Stephen NP           Allergy:  Review of patient's allergies indicates:  No Known Allergies      Assessment and Diagnosis       General Impression:       ICD-10-CM ICD-9-CM   1. Grief at loss of child  F43.21 309.0    Z63.4        Intervention/Counseling/Treatment Plan     · Medication Management:  · Pt is not interested in any medication at this time.  · Refer for counsleing  · Labs: reviewed most recent  · The treatment plan and follow up plan were reviewed with the patient.  · Discussed with patient informed consent, risks vs. benefits, alternative treatments, side effect profile and the inherent unpredictability of individual responses to treatments and all medications prescribed. The patient expresses understanding of the above and displays the  capacity to agree with this current plan and had no other questions.  · Encouraged Patient to keep future appointments.   · Take medications as prescribed and abstain from substance abuse.   · Pt was told to present to ED or call 911 for SI/HI plan or intent, psychosis, or other psychiatric or medical emergency, and pt agrees to this and verbalized understanding.        Return to Clinic: as needed      Face-to-face time with patient:  22 minutes  Total time:  30 minutes of total time spent on the encounter, which includes face to face time and non-face to face time preparing to see the patient (eg, review of tests), Obtaining and/or reviewing separately obtained history, Documenting clinical information in the electronic or other health record, Independently interpreting results (not separately reported) and communicating results to the patient/family/caregiver, or Care coordination (not separately reported).       Lauryn Morales, MSN, APRN, PMHNP-BC [facility]

## 2022-06-23 ENCOUNTER — LAB VISIT (OUTPATIENT)
Dept: LAB | Facility: HOSPITAL | Age: 57
End: 2022-06-23
Attending: FAMILY MEDICINE
Payer: OTHER GOVERNMENT

## 2022-06-23 DIAGNOSIS — E78.2 MIXED HYPERLIPIDEMIA: ICD-10-CM

## 2022-06-23 DIAGNOSIS — I10 PRIMARY HYPERTENSION: ICD-10-CM

## 2022-06-23 DIAGNOSIS — R74.01 TRANSAMINITIS: ICD-10-CM

## 2022-06-23 DIAGNOSIS — Z76.89 ENCOUNTER TO ESTABLISH CARE: ICD-10-CM

## 2022-06-23 LAB
ALBUMIN SERPL BCP-MCNC: 3.9 G/DL (ref 3.5–5.2)
ALP SERPL-CCNC: 134 U/L (ref 55–135)
ALT SERPL W/O P-5'-P-CCNC: 101 U/L (ref 10–44)
ANION GAP SERPL CALC-SCNC: 8 MMOL/L (ref 8–16)
AST SERPL-CCNC: 42 U/L (ref 10–40)
BASOPHILS # BLD AUTO: 0.12 K/UL (ref 0–0.2)
BASOPHILS NFR BLD: 1.8 % (ref 0–1.9)
BILIRUB SERPL-MCNC: 0.5 MG/DL (ref 0.1–1)
BUN SERPL-MCNC: 18 MG/DL (ref 6–20)
CALCIUM SERPL-MCNC: 9.6 MG/DL (ref 8.7–10.5)
CHLORIDE SERPL-SCNC: 102 MMOL/L (ref 95–110)
CO2 SERPL-SCNC: 29 MMOL/L (ref 23–29)
COMPLEXED PSA SERPL-MCNC: 0.59 NG/ML (ref 0–4)
CREAT SERPL-MCNC: 1.1 MG/DL (ref 0.5–1.4)
DIFFERENTIAL METHOD: ABNORMAL
EOSINOPHIL # BLD AUTO: 0.2 K/UL (ref 0–0.5)
EOSINOPHIL NFR BLD: 2.4 % (ref 0–8)
ERYTHROCYTE [DISTWIDTH] IN BLOOD BY AUTOMATED COUNT: 12 % (ref 11.5–14.5)
EST. GFR  (AFRICAN AMERICAN): >60 ML/MIN/1.73 M^2
EST. GFR  (NON AFRICAN AMERICAN): >60 ML/MIN/1.73 M^2
GLUCOSE SERPL-MCNC: 97 MG/DL (ref 70–110)
HCT VFR BLD AUTO: 44.4 % (ref 40–54)
HGB BLD-MCNC: 14.5 G/DL (ref 14–18)
IMM GRANULOCYTES # BLD AUTO: 0.05 K/UL (ref 0–0.04)
IMM GRANULOCYTES NFR BLD AUTO: 0.7 % (ref 0–0.5)
LYMPHOCYTES # BLD AUTO: 2 K/UL (ref 1–4.8)
LYMPHOCYTES NFR BLD: 30.2 % (ref 18–48)
MCH RBC QN AUTO: 30.8 PG (ref 27–31)
MCHC RBC AUTO-ENTMCNC: 32.7 G/DL (ref 32–36)
MCV RBC AUTO: 94 FL (ref 82–98)
MONOCYTES # BLD AUTO: 0.5 K/UL (ref 0.3–1)
MONOCYTES NFR BLD: 7.8 % (ref 4–15)
NEUTROPHILS # BLD AUTO: 3.8 K/UL (ref 1.8–7.7)
NEUTROPHILS NFR BLD: 57.1 % (ref 38–73)
NRBC BLD-RTO: 0 /100 WBC
PLATELET # BLD AUTO: 298 K/UL (ref 150–450)
PMV BLD AUTO: 10.5 FL (ref 9.2–12.9)
POTASSIUM SERPL-SCNC: 3.9 MMOL/L (ref 3.5–5.1)
PROT SERPL-MCNC: 7.1 G/DL (ref 6–8.4)
RBC # BLD AUTO: 4.71 M/UL (ref 4.6–6.2)
SODIUM SERPL-SCNC: 139 MMOL/L (ref 136–145)
WBC # BLD AUTO: 6.68 K/UL (ref 3.9–12.7)

## 2022-06-23 PROCEDURE — 36415 COLL VENOUS BLD VENIPUNCTURE: CPT | Performed by: FAMILY MEDICINE

## 2022-06-23 PROCEDURE — 83701 LIPOPROTEIN BLD HR FRACTION: CPT | Performed by: FAMILY MEDICINE

## 2022-06-23 PROCEDURE — 85025 COMPLETE CBC W/AUTO DIFF WBC: CPT | Performed by: FAMILY MEDICINE

## 2022-06-23 PROCEDURE — 84153 ASSAY OF PSA TOTAL: CPT | Performed by: FAMILY MEDICINE

## 2022-06-23 PROCEDURE — 80053 COMPREHEN METABOLIC PANEL: CPT | Performed by: FAMILY MEDICINE

## 2022-06-26 LAB — LDLC SERPL-MCNC: 179 MG/DL

## 2022-06-28 ENCOUNTER — TELEPHONE (OUTPATIENT)
Dept: HEPATOLOGY | Facility: CLINIC | Age: 57
End: 2022-06-28
Payer: OTHER GOVERNMENT

## 2022-06-28 NOTE — TELEPHONE ENCOUNTER
Pt did not attend consult visit. Please contact pt to schedule consult visit with me for elevated liver enzymes    Thanks

## 2022-06-30 ENCOUNTER — LAB VISIT (OUTPATIENT)
Dept: LAB | Facility: HOSPITAL | Age: 57
End: 2022-06-30
Payer: OTHER GOVERNMENT

## 2022-06-30 ENCOUNTER — OFFICE VISIT (OUTPATIENT)
Dept: HEPATOLOGY | Facility: CLINIC | Age: 57
End: 2022-06-30
Payer: OTHER GOVERNMENT

## 2022-06-30 VITALS
OXYGEN SATURATION: 98 % | RESPIRATION RATE: 18 BRPM | BODY MASS INDEX: 27.2 KG/M2 | WEIGHT: 183.63 LBS | HEART RATE: 87 BPM | SYSTOLIC BLOOD PRESSURE: 121 MMHG | DIASTOLIC BLOOD PRESSURE: 81 MMHG | TEMPERATURE: 97 F | HEIGHT: 69 IN

## 2022-06-30 DIAGNOSIS — E78.2 MIXED HYPERLIPIDEMIA: ICD-10-CM

## 2022-06-30 DIAGNOSIS — I10 PRIMARY HYPERTENSION: ICD-10-CM

## 2022-06-30 DIAGNOSIS — R74.01 TRANSAMINITIS: ICD-10-CM

## 2022-06-30 DIAGNOSIS — R74.01 TRANSAMINITIS: Primary | ICD-10-CM

## 2022-06-30 PROBLEM — K75.81 NONALCOHOLIC STEATOHEPATITIS: Status: RESOLVED | Noted: 2022-06-15 | Resolved: 2022-06-30

## 2022-06-30 LAB
A1AT SERPL-MCNC: 124 MG/DL (ref 100–190)
ALBUMIN SERPL BCP-MCNC: 4.4 G/DL (ref 3.5–5.2)
ALP SERPL-CCNC: 136 U/L (ref 55–135)
ALT SERPL W/O P-5'-P-CCNC: 158 U/L (ref 10–44)
ANION GAP SERPL CALC-SCNC: 8 MMOL/L (ref 8–16)
AST SERPL-CCNC: 83 U/L (ref 10–40)
BILIRUB SERPL-MCNC: 0.6 MG/DL (ref 0.1–1)
BUN SERPL-MCNC: 20 MG/DL (ref 6–20)
CALCIUM SERPL-MCNC: 10.4 MG/DL (ref 8.7–10.5)
CERULOPLASMIN SERPL-MCNC: 29 MG/DL (ref 15–45)
CHLORIDE SERPL-SCNC: 101 MMOL/L (ref 95–110)
CK SERPL-CCNC: 240 U/L (ref 20–200)
CO2 SERPL-SCNC: 31 MMOL/L (ref 23–29)
CREAT SERPL-MCNC: 1 MG/DL (ref 0.5–1.4)
EST. GFR  (AFRICAN AMERICAN): >60 ML/MIN/1.73 M^2
EST. GFR  (NON AFRICAN AMERICAN): >60 ML/MIN/1.73 M^2
FERRITIN SERPL-MCNC: 179 NG/ML (ref 20–300)
GLUCOSE SERPL-MCNC: 91 MG/DL (ref 70–110)
IGG SERPL-MCNC: 1030 MG/DL (ref 650–1600)
IRON SERPL-MCNC: 105 UG/DL (ref 45–160)
POTASSIUM SERPL-SCNC: 3.9 MMOL/L (ref 3.5–5.1)
PROT SERPL-MCNC: 7.6 G/DL (ref 6–8.4)
SATURATED IRON: 21 % (ref 20–50)
SODIUM SERPL-SCNC: 140 MMOL/L (ref 136–145)
TOTAL IRON BINDING CAPACITY: 499 UG/DL (ref 250–450)
TRANSFERRIN SERPL-MCNC: 337 MG/DL (ref 200–375)

## 2022-06-30 PROCEDURE — 99999 PR PBB SHADOW E&M-EST. PATIENT-LVL V: ICD-10-PCS | Mod: PBBFAC,,, | Performed by: NURSE PRACTITIONER

## 2022-06-30 PROCEDURE — 99203 PR OFFICE/OUTPT VISIT, NEW, LEVL III, 30-44 MIN: ICD-10-PCS | Mod: S$PBB,,, | Performed by: NURSE PRACTITIONER

## 2022-06-30 PROCEDURE — 86038 ANTINUCLEAR ANTIBODIES: CPT | Performed by: NURSE PRACTITIONER

## 2022-06-30 PROCEDURE — 82728 ASSAY OF FERRITIN: CPT | Performed by: NURSE PRACTITIONER

## 2022-06-30 PROCEDURE — 99215 OFFICE O/P EST HI 40 MIN: CPT | Mod: PBBFAC | Performed by: NURSE PRACTITIONER

## 2022-06-30 PROCEDURE — 80321 ALCOHOLS BIOMARKERS 1OR 2: CPT | Performed by: NURSE PRACTITIONER

## 2022-06-30 PROCEDURE — 36415 COLL VENOUS BLD VENIPUNCTURE: CPT | Performed by: NURSE PRACTITIONER

## 2022-06-30 PROCEDURE — 86790 VIRUS ANTIBODY NOS: CPT | Performed by: NURSE PRACTITIONER

## 2022-06-30 PROCEDURE — 99999 PR PBB SHADOW E&M-EST. PATIENT-LVL V: CPT | Mod: PBBFAC,,, | Performed by: NURSE PRACTITIONER

## 2022-06-30 PROCEDURE — 86706 HEP B SURFACE ANTIBODY: CPT | Performed by: NURSE PRACTITIONER

## 2022-06-30 PROCEDURE — 82550 ASSAY OF CK (CPK): CPT | Performed by: NURSE PRACTITIONER

## 2022-06-30 PROCEDURE — 86235 NUCLEAR ANTIGEN ANTIBODY: CPT | Performed by: NURSE PRACTITIONER

## 2022-06-30 PROCEDURE — 80074 ACUTE HEPATITIS PANEL: CPT | Performed by: NURSE PRACTITIONER

## 2022-06-30 PROCEDURE — 80053 COMPREHEN METABOLIC PANEL: CPT | Performed by: NURSE PRACTITIONER

## 2022-06-30 PROCEDURE — 86704 HEP B CORE ANTIBODY TOTAL: CPT | Performed by: NURSE PRACTITIONER

## 2022-06-30 PROCEDURE — 99203 OFFICE O/P NEW LOW 30 MIN: CPT | Mod: S$PBB,,, | Performed by: NURSE PRACTITIONER

## 2022-06-30 PROCEDURE — 82390 ASSAY OF CERULOPLASMIN: CPT | Performed by: NURSE PRACTITIONER

## 2022-06-30 PROCEDURE — 82784 ASSAY IGA/IGD/IGG/IGM EACH: CPT | Performed by: NURSE PRACTITIONER

## 2022-06-30 PROCEDURE — 82103 ALPHA-1-ANTITRYPSIN TOTAL: CPT | Performed by: NURSE PRACTITIONER

## 2022-06-30 PROCEDURE — 86256 FLUORESCENT ANTIBODY TITER: CPT | Mod: 91 | Performed by: NURSE PRACTITIONER

## 2022-06-30 PROCEDURE — 84466 ASSAY OF TRANSFERRIN: CPT | Performed by: NURSE PRACTITIONER

## 2022-06-30 NOTE — PATIENT INSTRUCTIONS
1. Fibroscan to look for fat or scar tissue in the liver with return to clinic   2. Will check immunity markers for Hepatitis A and B and arrange for vaccination if needed  3. Labs today to  check for multiple causes of liver disease. These labs will release to you as soon as they are resulted but we will discuss them in detail at your upcoming visit to discuss what the lab results mean.   4.  Follow up in 1 week with fibroscan same day

## 2022-06-30 NOTE — PROGRESS NOTES
[facility] VISIT NEW PT NOTE    REFERRING PROVIDER:  Dr. Troy Heart  PCP: Troy Heart MD     CHIEF COMPLAINT: elevated liver enzymes     HPI: This is a 56 y.o.      White male with PMH noted below, presenting for evaluation of   elevated liver enzymes    Previous serologic w/u negative for viral hepatitis C - will obtain full sero w/u    Prior serologic workup:   Lab Results   Component Value Date    HEPCAB Negative 11/22/2021     Liver fibrosis staging:  -- fibroscan with RTC    Risk factors for fatty liver include overweight, HTN, HLD    Interval HPI: Presents today alone. No Herbal medications, No New medications or med changes   Previously taking Creatine for a few months, stopped January    Labs done 6/2022 show elevated transaminase levels (ALT > AST, elevated since at least 11/2021)  Platelets WNL, alk phos WNL  Synthetic liver functioning WNL    Lab Results   Component Value Date     (H) 06/23/2022    AST 42 (H) 06/23/2022    ALKPHOS 134 06/23/2022    BILITOT 0.5 06/23/2022    ALBUMIN 3.9 06/23/2022    INR 0.9 02/15/2022     06/23/2022     Abd U/S done in 2013 noted hepatomegaly - will repeat     Denies family history of liver disease . Denies alcohol consumption currently or in the past    Immunity to Hep A and B - will check with next labs        Allergy and medication list reviewed and updated     PMHX:  has a past medical history of ADHD (attention deficit hyperactivity disorder), Anxiety, psychiatric care, Hypertension, Psychiatric problem, PTSD (post-traumatic stress disorder), SVT (supraventricular tachycardia), and Therapy.    PSHX:  has a past surgical history that includes Vasectomy and Ablation (N/A, 2/18/2022).    FAMILY HISTORY: Updated and reviewed in [employer]    SOCIAL HISTORY:   Social History     Substance and Sexual Activity   Alcohol Use Never       Social History     Substance and Sexual Activity   Drug Use Never       ROS:   GENERAL: Denies  "fatigue  CARDIOVASCULAR: Denies edema  GI: Denies abdominal pain  SKIN: Denies rash, itching   NEURO: Denies confusion, memory loss, or mood changes    PHYSICAL EXAM:   Friendly      White male, in no acute distress; alert and oriented to person, place and time  VITALS: /81 (BP Location: Left arm, Patient Position: Sitting, BP Method: Medium (Automatic))   Pulse 87   Temp 97.1 °F (36.2 °C) (Oral)   Resp 18   Ht 5' 9" (1.753 m)   Wt 83.3 kg (183 lb 10.3 oz)   SpO2 98%   BMI 27.12 kg/m²   EYES: Sclerae anicteric  GI: Soft, non-tender, non-distended. No ascites.  EXTREMITIES:  No edema.  SKIN: Warm and dry. No jaundice. No telangectasias noted. No palmar erythema.  NEURO:  No asterixis.  PSYCH:  Thought and speech pattern appropriate. Behavior normal      EDUCATION:  See instructions discussed with patient in Instructions section of the After Visit Summary     ASSESSMENT & PLAN:  56 y.o.      White male with:  1. Elevated liver enzymes   -- Labs note elevated transaminase levels (ALT > AST), elevated since 11/2021  --- synthetic liver function WNL  --- Abd US will repeat   --- previous serological work up : will obtain   --- Hep A and B immunity: will check today, will arrange Hep A and B vaccines if needed    -- labs today  Orders Placed This Encounter   Procedures    FibroScan (Vibration Controlled Transient Elastography)    US Abdomen Complete    Alpha-1-Antitrypsin    CRISTOPHER Screen w/Reflex    Antimitochondrial Antibody    Anti-Smooth Muscle Antibody    Ceruloplasmin    CK    Comprehensive Metabolic Panel    Ferritin    Hepatitis Panel, Acute    IgG    Iron and TIBC    Phosphatidylethanol (PETH)    Hepatitis A antibody, IgG    Hepatitis B Core Antibody, Total    Hepatitis B Surface Ab, Qualitative      -- fibroscan with RTC  -- US soon    2. Overweight, HTN, HLD  Body mass index is 27.12 kg/m².   -- can increase fatty liver risk factors  -- recommend HLD treatment, pt to discuss " with PCP, reports he is statin intolerant in the past so may benefit from a PCSK-9        Follow up in about 1 week (around 7/7/2022). with US, labs and fibroscan before  Orders Placed This Encounter   Procedures    FibroScan (Vibration Controlled Transient Elastography)    US Abdomen Complete    Alpha-1-Antitrypsin    CRISTOPHER Screen w/Reflex    Antimitochondrial Antibody    Anti-Smooth Muscle Antibody    Ceruloplasmin    CK    Comprehensive Metabolic Panel    Ferritin    Hepatitis Panel, Acute    IgG    Iron and TIBC    Phosphatidylethanol (PETH)    Hepatitis A antibody, IgG    Hepatitis B Core Antibody, Total    Hepatitis B Surface Ab, Qualitative        Thank you for allowing me to participate in the care of Brice FelipeNANCY MirandaC    I spent a total of 30 minutes on the day of the visit.This includes face to face time and non-face to face time preparing to see the patient (eg, review of tests), obtaining and/or reviewing separately obtained history, documenting clinical information in the electronic or other health record, independently interpreting results and communicating results to the patient/family/caregiver, and coordinating care.         CC'ed note to:   Troy Heart MD

## 2022-07-01 LAB
ANA SER QL IF: NORMAL
HAV IGG SER QL IA: POSITIVE
HBV CORE AB SERPL QL IA: NEGATIVE
HBV SURFACE AB SER-ACNC: POSITIVE M[IU]/ML

## 2022-07-04 LAB
PETH 16:0/18.1 (POPETH): <10 NG/ML
PETH 16:0/18.2 (PLPETH): <10 NG/ML

## 2022-07-05 LAB
MITOCHONDRIA AB TITR SER IF: NORMAL {TITER}
SMOOTH MUSCLE AB TITR SER IF: NORMAL {TITER}

## 2022-07-08 LAB
HAV IGM SERPL QL IA: NEGATIVE
HBV CORE IGM SERPL QL IA: NEGATIVE
HBV SURFACE AG SERPL QL IA: NEGATIVE
HCV AB SERPL QL IA: NEGATIVE

## 2022-07-18 ENCOUNTER — HOSPITAL ENCOUNTER (OUTPATIENT)
Dept: RADIOLOGY | Facility: HOSPITAL | Age: 57
Discharge: HOME | End: 2022-07-18
Attending: NURSE PRACTITIONER
Payer: OTHER GOVERNMENT

## 2022-07-18 DIAGNOSIS — R74.01 TRANSAMINITIS: ICD-10-CM

## 2022-07-18 PROCEDURE — 76700 US EXAM ABDOM COMPLETE: CPT | Mod: 26,,, | Performed by: RADIOLOGY

## 2022-07-18 PROCEDURE — 76700 US EXAM ABDOM COMPLETE: CPT | Mod: TC

## 2022-07-18 PROCEDURE — 76700 US ABDOMEN COMPLETE: ICD-10-PCS | Mod: 26,,, | Performed by: RADIOLOGY

## 2022-07-19 ENCOUNTER — PROCEDURE VISIT (OUTPATIENT)
Dept: HEPATOLOGY | Facility: CLINIC | Age: 57
End: 2022-07-19
Payer: OTHER GOVERNMENT

## 2022-07-19 ENCOUNTER — OFFICE VISIT (OUTPATIENT)
Dept: HEPATOLOGY | Facility: CLINIC | Age: 57
End: 2022-07-19
Payer: OTHER GOVERNMENT

## 2022-07-19 VITALS
RESPIRATION RATE: 18 BRPM | WEIGHT: 185.88 LBS | HEART RATE: 78 BPM | TEMPERATURE: 97 F | OXYGEN SATURATION: 100 % | SYSTOLIC BLOOD PRESSURE: 140 MMHG | DIASTOLIC BLOOD PRESSURE: 85 MMHG | HEIGHT: 69 IN | BODY MASS INDEX: 27.53 KG/M2

## 2022-07-19 DIAGNOSIS — R74.01 TRANSAMINITIS: ICD-10-CM

## 2022-07-19 DIAGNOSIS — E78.2 MIXED HYPERLIPIDEMIA: ICD-10-CM

## 2022-07-19 DIAGNOSIS — I10 PRIMARY HYPERTENSION: ICD-10-CM

## 2022-07-19 DIAGNOSIS — K76.0 FATTY LIVER: Primary | ICD-10-CM

## 2022-07-19 PROCEDURE — 99214 PR OFFICE/OUTPT VISIT, EST, LEVL IV, 30-39 MIN: ICD-10-PCS | Mod: S$PBB,,, | Performed by: NURSE PRACTITIONER

## 2022-07-19 PROCEDURE — 91200 LIVER ELASTOGRAPHY: CPT | Mod: PBBFAC | Performed by: NURSE PRACTITIONER

## 2022-07-19 PROCEDURE — 99999 PR PBB SHADOW E&M-EST. PATIENT-LVL IV: CPT | Mod: PBBFAC,,, | Performed by: NURSE PRACTITIONER

## 2022-07-19 PROCEDURE — 91200 LIVER ELASTOGRAPHY: CPT | Mod: 26,S$PBB,, | Performed by: NURSE PRACTITIONER

## 2022-07-19 PROCEDURE — 99999 PR PBB SHADOW E&M-EST. PATIENT-LVL IV: ICD-10-PCS | Mod: PBBFAC,,, | Performed by: NURSE PRACTITIONER

## 2022-07-19 PROCEDURE — 99214 OFFICE O/P EST MOD 30 MIN: CPT | Mod: S$PBB,,, | Performed by: NURSE PRACTITIONER

## 2022-07-19 PROCEDURE — 91200 FIBROSCAN (VIBRATION CONTROLLED TRANSIENT ELASTOGRAPHY): ICD-10-PCS | Mod: 26,S$PBB,, | Performed by: NURSE PRACTITIONER

## 2022-07-19 PROCEDURE — 99214 OFFICE O/P EST MOD 30 MIN: CPT | Mod: PBBFAC | Performed by: NURSE PRACTITIONER

## 2022-07-19 NOTE — PATIENT INSTRUCTIONS
1. Fibroscan to look for fat or scar tissue in the liver showed >67% fat in the liver, no scar tissue damage   2.  Follow up in 1 year with labs a few days before, fibroscan same day     There is no FDA approved therapy for fatty liver disease. Therefore, these things are important:  Limit alcohol consumption, no more than 1-2 serving(s) of alcohol in any day (1 serving is 5 ounces of wine, 12 ounces of beer, or 1.5 ounces of liquor) and do NOT recommend any daily alcohol use    2 Weight loss goal of 5-10 lbs, referral for [facility] if interested. Also, if interested in a dietician visit to create a weight loss plan, contact the dietician team at [facility] at [email or web address] to schedule a visit to you can call [facility] in [place]: [phone] and the  will transfer the call to one of the dieticians to schedule an appointment. Or you can also call [phone] to schedule. They do offer video visits   3. Low carb/sugar, high fiber and protein diet.Try to limit your carb intake to LESS than 30-45 grams of carbs with a meal or LESS than 5-10 grams with any snack (total of any snack foods eaten during that time). Use [employer] or Lose It daniel to add up your carbs through the day. Do NOT drink any beverages with calories or carbs (this can lead to high blood sugar and weight gain). Also, some of our patients have been very successful with weight loss using the pre made/planned meal planning services that limit calories and portion size (one company in [place] is called [employer] but there are also many others out there. The main thing to look for is low calorie, high protein, low carb)  4. Exercise, 5 days per week, 30 minutes per day, as tolerated  5. Recommend good cholesterol, blood pressure, blood sugar levels       In some people, fatty liver can progress to steatohepatitis (inflamatory fatty liver) and possibly to cirrhosis, increasing the risk for liver  cancer, liver failure, and death. Therefore, the lifestyle changes are very important to decrease this risk.     Website with information about fatty liver and inflammation related to fatty liver (SEGURA) = [email or web address]  AND [email or web address]

## 2022-07-19 NOTE — PROCEDURES
FibroScan (Vibration Controlled Transient Elastography)    Date/Time: 7/19/2022 9:15 AM  Performed by: Neeru Luna NP  Authorized by: Neeru Luna NP     Diagnosis:  NAFLD    Probe:  M    Universal Protocol: Patient's identity, procedure and site were verified, confirmatory pause was performed.  Discussed procedure including risks and potential complications.  Questions answered.  Patient verbalizes understanding and wishes to proceed with VCTE.     Procedure: After providing explanations of the procedure, patient was placed in the supine position with right arm in maximum abduction to allow optimal exposure of right lateral abdomen.  Patient was briefly assessed, Testing was performed in the mid-axillary location, 50Hz Shear Wave pulses were applied and the resulting Shear Wave and Propagation Speed detected with a 3.5 MHz ultrasonic signal, using the FibroScan probe, Skin to liver capsule distance and liver parenchyma were accessed during the entire examination with the FibroScan probe, Patient was instructed to breathe normally and to abstain from sudden movements during the procedure, allowing for random measurements of liver stiffness. At least 10 Shear Waves were produced, Individual measurements of each Shear Wave were calculated.  Patient tolerated the procedure well with no complications.  Meets discharge criteria as was dismissed.  Rates pain 0 out of 10.  Patient will follow up with ordering provider to review results.      Findings  Median liver stiffness score:  6.3  CAP Reading: dB/m:  319    IQR/med %:  10  Interpretation  Fibrosis interpretation is based on medial liver stiffness - Kilopascal (kPa).    Fibrosis Stage:  F 0-1  Steatosis interpretation is based on controlled attenuation parameter - (dB/m).    Steatosis Grade:  S3

## 2022-07-19 NOTE — PROGRESS NOTES
[facility] VISIT FOLLOW UP NOTE    PCP: Troy Heart MD     CHIEF COMPLAINT: elevated liver enzymes, fatty liver     HPI: This is a 56 y.o.      White male with PMH noted below, presenting for follow up of fatty liver     Serological workup was negative for Won's, alpha-1 antitrypsin deficiency, hemochromatosis, autoimmune etiology, and viral hepatitis.   PETH negative    Prior serologic workup:   Lab Results   Component Value Date    SMOOTHMUSCAB Negative 1:40 06/30/2022    AMAIFA Negative 1:40 06/30/2022    IGGSERUM 1030 06/30/2022    ANASCREEN Negative <1:80 06/30/2022    FERRITIN 179 06/30/2022    FESATURATED 21 06/30/2022    CERULOPLSM 29.0 06/30/2022    HEPBSAG Negative 06/30/2022    HEPCAB Negative 06/30/2022    HEPAIGM Negative 06/30/2022     Liver fibrosis staging:  -- fibroscan 7/2022 noted F0, S3 (kPA 6.3, )    Risk factors for fatty liver include overweight, HTN, HLD    Interval HPI: Presents today alone. No Herbal medications, No New medications or med changes   Previously taking Creatine for a few months, stopped January  No DILI risk with micardis or chlorithalidone   Taking NSAIDs intermittently, does not exceed max daily dose   Did have a significant amount of stress/grief after loss of grandchild recently     Labs done 6/2022 show elevated transaminase levels (ALT > AST, elevated since at least 11/2021)  Platelets WNL, alk phos WNL  Synthetic liver functioning WNL    Lab Results   Component Value Date     (H) 06/30/2022    AST 83 (H) 06/30/2022    ALKPHOS 136 (H) 06/30/2022    BILITOT 0.6 06/30/2022    ALBUMIN 4.4 06/30/2022    INR 0.9 02/15/2022     06/23/2022     Abd U/S done 7/2022 noted hepatomegaly, fatty liver      Denies family history of liver disease . Denies alcohol consumption currently or in the past    + Immunity to Hep A and B        Allergy and medication list reviewed and updated     PMHX:  has a past medical history of ADHD  "(attention deficit hyperactivity disorder), Anxiety, psychiatric care, Hypertension, Psychiatric problem, PTSD (post-traumatic stress disorder), SVT (supraventricular tachycardia), and Therapy.    PSHX:  has a past surgical history that includes Vasectomy and Ablation (N/A, 2/18/2022).    FAMILY HISTORY: Updated and reviewed in [facility]    SOCIAL HISTORY:   Social History     Substance and Sexual Activity   Alcohol Use Never       Social History     Substance and Sexual Activity   Drug Use Never       ROS:   GENERAL: Denies fatigue  CARDIOVASCULAR: Denies edema  GI: Denies abdominal pain  SKIN: Denies rash, itching   NEURO: Denies confusion, memory loss, or mood changes    PHYSICAL EXAM:   Friendly male, in no acute distress; alert and oriented to person, place and time  VITALS: BP (!) 140/85 (BP Location: Right arm, Patient Position: Sitting, BP Method: Medium (Automatic))   Pulse 78   Temp 96.8 °F (36 °C) (Oral)   Resp 18   Ht 5' 9" (1.753 m)   Wt 84.3 kg (185 lb 13.6 oz)   SpO2 100%   BMI 27.44 kg/m²   EYES: Sclerae anicteric  GI: Soft, non-tender, non-distended. No ascites.  EXTREMITIES:  No edema.  SKIN: Warm and dry. No jaundice. No telangectasias noted. No palmar erythema.  NEURO:  No asterixis.  PSYCH:  Thought and speech pattern appropriate. Behavior normal      EDUCATION:  See instructions discussed with patient in Instructions section of the After Visit Summary     ASSESSMENT & PLAN:  56 y.o.      White male with:  1. Fatty liver, likely related to metabolic risk factors   - see HPI  -- Fibroscan 7/2022 noted F0, S3 - will repeat yearly   -- Recommendations discussed with patient:  1. Limit alcohol consumption  2 Weight loss goal of 5-10 lbs  3. Low carb/sugar, high fiber and protein diet, limit your carb intake to LESS than 30-45 grams of carbs with a meal or LESS than 5-10 grams with any snack   4. Exercise, 5 days per week, 30 minutes per day, as tolerated  5. Recommend good cholesterol, blood " pressure, blood sugar levels   6. Consider enrolling in [facility] fibrosis clinical trails - not a candidate, no fibrosis     2. Elevated liver enzymes  -- significant elevation not charactistic for fatty liver, may be r/t stress? No DILI concern  -- Serological workup was negative for Won's, alpha-1 antitrypsin deficiency, hemochromatosis, autoimmune etiology, and viral hepatitis.   PETH negative    3. Overweight, HTN, HLD  -- Body mass index is 27.44 kg/m².   -- increases risk for fatty liver  -- recommend HLD treatment, pt to discuss with PCP, reports he is statin intolerant in the past so may benefit from a PCSK-9, pt to discuss with his cardiologist       Labs in 3 months then   Follow up in about 1 year (around 7/19/2023). with labs and fibroscan before  Orders Placed This Encounter   Procedures    FibroScan (Vibration Controlled Transient Elastography)    Hepatic Function Panel    Hepatic Function Panel        Thank you for allowing me to participate in the care of Brice Law CORRIE Alves    I spent a total of 30 minutes on the day of the visit.This includes face to face time and non-face to face time preparing to see the patient (eg, review of tests), obtaining and/or reviewing separately obtained history, documenting clinical information in the electronic or other health record, independently interpreting results and communicating results to the patient/family/caregiver, and coordinating care.         CC'ed note to:   Troy Heart MD

## 2022-08-31 ENCOUNTER — OFFICE VISIT (OUTPATIENT)
Dept: PSYCHIATRY | Facility: CLINIC | Age: 57
End: 2022-08-31
Payer: OTHER GOVERNMENT

## 2022-08-31 DIAGNOSIS — Z63.4 GRIEF AT LOSS OF CHILD: ICD-10-CM

## 2022-08-31 DIAGNOSIS — F43.21 GRIEF AT LOSS OF CHILD: ICD-10-CM

## 2022-08-31 DIAGNOSIS — F41.1 GENERALIZED ANXIETY DISORDER: ICD-10-CM

## 2022-08-31 DIAGNOSIS — F90.2 ATTENTION DEFICIT HYPERACTIVITY DISORDER (ADHD), COMBINED TYPE: ICD-10-CM

## 2022-08-31 DIAGNOSIS — F43.10 PTSD (POST-TRAUMATIC STRESS DISORDER): Primary | ICD-10-CM

## 2022-08-31 PROCEDURE — 90791 PR PSYCHIATRIC DIAGNOSTIC EVALUATION: ICD-10-PCS | Mod: ,,, | Performed by: SOCIAL WORKER

## 2022-08-31 PROCEDURE — 99999 PR PBB SHADOW E&M-EST. PATIENT-LVL I: ICD-10-PCS | Mod: PBBFAC,,, | Performed by: SOCIAL WORKER

## 2022-08-31 PROCEDURE — 90785 PSYTX COMPLEX INTERACTIVE: CPT | Mod: ,,, | Performed by: SOCIAL WORKER

## 2022-08-31 PROCEDURE — 90785 PR INTERACTIVE COMPLEXITY: ICD-10-PCS | Mod: ,,, | Performed by: SOCIAL WORKER

## 2022-08-31 PROCEDURE — 99211 OFF/OP EST MAY X REQ PHY/QHP: CPT | Mod: PBBFAC | Performed by: SOCIAL WORKER

## 2022-08-31 PROCEDURE — 99999 PR PBB SHADOW E&M-EST. PATIENT-LVL I: CPT | Mod: PBBFAC,,, | Performed by: SOCIAL WORKER

## 2022-08-31 PROCEDURE — 90791 PSYCH DIAGNOSTIC EVALUATION: CPT | Mod: ,,, | Performed by: SOCIAL WORKER

## 2022-08-31 SDOH — SOCIAL DETERMINANTS OF HEALTH (SDOH): DISSAPEARANCE AND DEATH OF FAMILY MEMBER: Z63.4
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
## 2022-09-07 NOTE — PATIENT INSTRUCTIONS
Welcome to [facility] and thank you for choosing to work with me. It is very important to me that you get the results you want in therapy and that your experience is positive. This letter is to provide you with an overview of some of our policies.     First sessions are generally 1 hr and follow up sessions will last 45 minutes. If you need to cancel an appointment, please give us at least 24 hours' notice so that we can offer your appointment time to another client. To schedule appointments, please contact our schedulers at [phone], or you can book online through the [facility] portal, [email or web address].    If missing more than 3 scheduled sessions without 24 hour notice or providing appropriate excuse ahead of time you will be asked to seek care elsewhere.     The best way to reach me is through the messaging option on the [facility] portal. I strongly encourage you to sign on to the [facility] portal if you have not already done this. I typically answer messages within 24 hours, but it is not to be used for psychiatric emergencies. If you experience a psychiatric emergency, please go to the nearest emergency room or call our office if you need to talk to someone during business hours. If you need to talk to someone after business hours, call our office [phone], and someone will page the resident on-call.   Your sessions are confidential with some exceptions. Some limitations to confidentiality include:  If a patient poses an imminent risk to self or others (ex: intent to commit suicide),  If there is a reasonable concern that a child or dependent/elder adult is being abused or neglected,  If collaboration with other professionals would help you (this involves your signed consent if that professional is not employed by [facility]),  Though access is restricted, psychiatric records and emails are a part of your medical record. I do limit what I disclose in records.    Please let me know if you  have any questions. I look forward to working with you.     Warmly,       Valerie Atkins LCSW  (she/her)

## 2022-09-14 ENCOUNTER — OFFICE VISIT (OUTPATIENT)
Dept: INTERNAL MEDICINE | Facility: CLINIC | Age: 57
End: 2022-09-14
Payer: OTHER GOVERNMENT

## 2022-09-14 ENCOUNTER — LAB VISIT (OUTPATIENT)
Dept: LAB | Facility: HOSPITAL | Age: 57
End: 2022-09-14
Payer: OTHER GOVERNMENT

## 2022-09-14 VITALS
DIASTOLIC BLOOD PRESSURE: 88 MMHG | BODY MASS INDEX: 27.75 KG/M2 | HEIGHT: 69 IN | WEIGHT: 187.38 LBS | HEART RATE: 83 BPM | SYSTOLIC BLOOD PRESSURE: 138 MMHG | OXYGEN SATURATION: 100 %

## 2022-09-14 DIAGNOSIS — Z23 NEEDS FLU SHOT: ICD-10-CM

## 2022-09-14 DIAGNOSIS — K76.0 FATTY LIVER: ICD-10-CM

## 2022-09-14 DIAGNOSIS — Z23 NEED FOR PNEUMOCOCCAL VACCINATION: ICD-10-CM

## 2022-09-14 DIAGNOSIS — R74.01 TRANSAMINITIS: ICD-10-CM

## 2022-09-14 DIAGNOSIS — F90.2 ATTENTION DEFICIT HYPERACTIVITY DISORDER (ADHD), COMBINED TYPE: Primary | ICD-10-CM

## 2022-09-14 LAB
ALBUMIN SERPL BCP-MCNC: 4.2 G/DL (ref 3.5–5.2)
ALP SERPL-CCNC: 99 U/L (ref 55–135)
ALT SERPL W/O P-5'-P-CCNC: 114 U/L (ref 10–44)
AST SERPL-CCNC: 39 U/L (ref 10–40)
BILIRUB DIRECT SERPL-MCNC: 0.2 MG/DL (ref 0.1–0.3)
BILIRUB SERPL-MCNC: 0.6 MG/DL (ref 0.1–1)
PROT SERPL-MCNC: 7.1 G/DL (ref 6–8.4)

## 2022-09-14 PROCEDURE — 80076 HEPATIC FUNCTION PANEL: CPT | Performed by: NURSE PRACTITIONER

## 2022-09-14 PROCEDURE — 99213 OFFICE O/P EST LOW 20 MIN: CPT | Mod: S$PBB,,, | Performed by: FAMILY MEDICINE

## 2022-09-14 PROCEDURE — 36415 COLL VENOUS BLD VENIPUNCTURE: CPT | Performed by: NURSE PRACTITIONER

## 2022-09-14 PROCEDURE — 99999 PR PBB SHADOW E&M-EST. PATIENT-LVL IV: ICD-10-PCS | Mod: PBBFAC,,, | Performed by: FAMILY MEDICINE

## 2022-09-14 PROCEDURE — 99214 OFFICE O/P EST MOD 30 MIN: CPT | Mod: PBBFAC | Performed by: FAMILY MEDICINE

## 2022-09-14 PROCEDURE — 99213 PR OFFICE/OUTPT VISIT, EST, LEVL III, 20-29 MIN: ICD-10-PCS | Mod: S$PBB,,, | Performed by: FAMILY MEDICINE

## 2022-09-14 PROCEDURE — 99999 PR PBB SHADOW E&M-EST. PATIENT-LVL IV: CPT | Mod: PBBFAC,,, | Performed by: FAMILY MEDICINE

## 2022-09-14 RX ORDER — DEXTROAMPHETAMINE SACCHARATE, AMPHETAMINE ASPARTATE MONOHYDRATE, DEXTROAMPHETAMINE SULFATE AND AMPHETAMINE SULFATE 5; 5; 5; 5 MG/1; MG/1; MG/1; MG/1
20 CAPSULE, EXTENDED RELEASE ORAL EVERY MORNING
Qty: 30 CAPSULE | Refills: 0 | Status: SHIPPED | OUTPATIENT
Start: 2022-09-16 | End: 2022-12-14

## 2022-09-14 RX ORDER — DEXTROAMPHETAMINE SACCHARATE, AMPHETAMINE ASPARTATE MONOHYDRATE, DEXTROAMPHETAMINE SULFATE AND AMPHETAMINE SULFATE 5; 5; 5; 5 MG/1; MG/1; MG/1; MG/1
20 CAPSULE, EXTENDED RELEASE ORAL EVERY MORNING
Qty: 30 CAPSULE | Refills: 0 | Status: SHIPPED | OUTPATIENT
Start: 2022-10-16 | End: 2022-12-14

## 2022-09-14 RX ORDER — DEXTROAMPHETAMINE SACCHARATE, AMPHETAMINE ASPARTATE MONOHYDRATE, DEXTROAMPHETAMINE SULFATE AND AMPHETAMINE SULFATE 2.5; 2.5; 2.5; 2.5 MG/1; MG/1; MG/1; MG/1
CAPSULE, EXTENDED RELEASE ORAL
Qty: 30 CAPSULE | Refills: 0 | Status: SHIPPED | OUTPATIENT
Start: 2022-09-16 | End: 2022-12-14

## 2022-09-14 RX ORDER — DEXTROAMPHETAMINE SACCHARATE, AMPHETAMINE ASPARTATE MONOHYDRATE, DEXTROAMPHETAMINE SULFATE AND AMPHETAMINE SULFATE 5; 5; 5; 5 MG/1; MG/1; MG/1; MG/1
20 CAPSULE, EXTENDED RELEASE ORAL EVERY MORNING
Qty: 30 CAPSULE | Refills: 0 | Status: SHIPPED | OUTPATIENT
Start: 2022-11-15 | End: 2022-12-14

## 2022-09-14 RX ORDER — DEXTROAMPHETAMINE SACCHARATE, AMPHETAMINE ASPARTATE MONOHYDRATE, DEXTROAMPHETAMINE SULFATE AND AMPHETAMINE SULFATE 2.5; 2.5; 2.5; 2.5 MG/1; MG/1; MG/1; MG/1
CAPSULE, EXTENDED RELEASE ORAL
Qty: 30 CAPSULE | Refills: 0 | Status: SHIPPED | OUTPATIENT
Start: 2022-11-15 | End: 2022-12-14

## 2022-09-14 RX ORDER — DEXTROAMPHETAMINE SACCHARATE, AMPHETAMINE ASPARTATE MONOHYDRATE, DEXTROAMPHETAMINE SULFATE AND AMPHETAMINE SULFATE 2.5; 2.5; 2.5; 2.5 MG/1; MG/1; MG/1; MG/1
CAPSULE, EXTENDED RELEASE ORAL
Qty: 30 CAPSULE | Refills: 0 | Status: SHIPPED | OUTPATIENT
Start: 2022-10-16 | End: 2022-12-14 | Stop reason: SDUPTHER

## 2022-09-14 NOTE — PROGRESS NOTES
"Subjective:       Patient ID: Brice Byrd is a 57 y.o. male.    Chief Complaint: ADHD and Medication Refill    Patient is here for follow-up of ADHD  Retired from  recently  In his 40s had extensive testing and Dx ADHD and on adderall 20 AM, 10 afternoon with excellent results on thinking and QoL, has about 30d left on his terminal Rx form [employer]. So will give him 30d Rx to fill on 7/15 and another for 8/15. Then will see him on 9/15 and q 3 mo thereafter.  There is a 2/24/22 letter from [facility] saying:   "Mr. Brice Byrd is a patient of [facility] and his physician Dr. Al Guerra is aware that he takes Adderall 20 mg as needed and notes that this medication will not affect his SVT heart condition."   empty as he got this form     Was set up to see liver doc, cardiology, EYE doc    The 10-year ASCVD risk score (Vimal GARCIA, et al., 2019) is: 11.3%    Values used to calculate the score:      Age: 57 years      Sex: Male      Is Non- : No      Diabetic: No      Tobacco smoker: No      Systolic Blood Pressure: 138 mmHg      Is BP treated: Yes      HDL Cholesterol: 63 mg/dL      Total Cholesterol: 303 mg/dL      Review of patient's allergies indicates:  No Known Allergies    Social History     Tobacco Use    Smoking status: Former    Smokeless tobacco: Never   Substance Use Topics    Alcohol use: Never    Drug use: Never       No family history on file.    Past Surgical History:   Procedure Laterality Date    ABLATION N/A 2/18/2022    Procedure: Ablation;  Surgeon: Al Guerra MD;  Location: [facility];  Service: Cardiology;  Laterality: N/A;  SVT, RFA, JERONIMO, anes, MB, 3prep    VASECTOMY         Patient Active Problem List   Diagnosis    Transaminitis    Syncope and collapse    Primary hypertension    SVT (supraventricular tachycardia)    Mixed hyperlipidemia    Status post catheter ablation of slow pathway    Attention deficit hyperactivity disorder " "(ADHD), combined type    Open angle with borderline findings, low risk, bilateral    Fatty liver       Current Outpatient Medications on File Prior to Visit   Medication Sig Dispense Refill    dextroamphetamine-amphetamine (ADDERALL XR) 10 MG 24 hr capsule 1 capsule every afternoon 30 capsule 0    dextroamphetamine-amphetamine (ADDERALL XR) 20 MG 24 hr capsule Take 1 capsule (20 mg total) by mouth every morning. 30 capsule 0    telmisartan (MICARDIS) 80 MG Tab Take 1 tablet (80 mg total) by mouth once daily. 90 tablet 3    chlorthalidone (HYGROTEN) 25 MG Tab Take 0.5 tablets (12.5 mg total) by mouth once daily. 45 tablet 0     Current Facility-Administered Medications on File Prior to Visit   Medication Dose Route Frequency Provider Last Rate Last Admin    sodium chloride 0.9% bolus 1,000 mL  1,000 mL Intravenous Once Li Stephen NP               Review of Systems   Constitutional:  Negative for chills and fever.   HENT:  Negative for ear pain.    Eyes:  Negative for pain.   Respiratory:  Negative for chest tightness.    Cardiovascular:  Negative for chest pain.   Gastrointestinal:  Negative for abdominal pain.   Genitourinary:  Negative for flank pain.   Musculoskeletal:  Negative for gait problem.   Neurological:  Negative for syncope.   Psychiatric/Behavioral:  Negative for behavioral problems.      Objective:    /88   Pulse 83   Ht 5' 9" (1.753 m)   Wt 85 kg (187 lb 6.3 oz)   SpO2 100%   BMI 27.67 kg/m²     Physical Exam  Constitutional:       Appearance: He is well-developed.   HENT:      Head: Normocephalic and atraumatic.   Cardiovascular:      Rate and Rhythm: Normal rate.      Heart sounds: Normal heart sounds.   Pulmonary:      Effort: No respiratory distress.      Breath sounds: Normal breath sounds. No wheezing or rales.   Abdominal:      Palpations: Abdomen is soft.   Musculoskeletal:      Cervical back: Neck supple.   Skin:     General: Skin is dry.   Neurological:      Mental Status: He " is alert.   Psychiatric:         Behavior: Behavior normal.       Assessment:       1. Attention deficit hyperactivity disorder (ADHD), combined type    2. Need for pneumococcal vaccination    3. Needs flu shot        Plan:       Brice was seen today for adhd and medication refill.    Diagnoses and all orders for this visit:    Attention deficit hyperactivity disorder (ADHD), combined type  -     dextroamphetamine-amphetamine (ADDERALL XR) 20 MG 24 hr capsule; Take 1 capsule (20 mg total) by mouth every morning.  -     dextroamphetamine-amphetamine (ADDERALL XR) 10 MG 24 hr capsule; 1 capsule every afternoon  -     dextroamphetamine-amphetamine (ADDERALL XR) 20 MG 24 hr capsule; Take 1 capsule (20 mg total) by mouth every morning.  -     dextroamphetamine-amphetamine (ADDERALL XR) 10 MG 24 hr capsule; 1 capsule every afternoon  -     dextroamphetamine-amphetamine (ADDERALL XR) 20 MG 24 hr capsule; Take 1 capsule (20 mg total) by mouth every morning.  -     dextroamphetamine-amphetamine (ADDERALL XR) 10 MG 24 hr capsule; 1 capsule every afternoon  Each of the above #30, no RF for 30mg daily. One Rx for 30d supply was printed for filling 9/16/22. A second was printed for filling 10/16/22. A third was printed for filling 11/15/22 . Each Rx was dated today. F/u 12/14/22. No refills are possible by telephone, must be seen in person.     Need for pneumococcal vaccination  PCV-20 today    Needs flu shot  -flu shot today    Joo get omicron booster and shingles at a later time    Follow up in about 3 months (around 12/14/2022) for q 3 mo ADD meds.  He has not yet meet with his cardiologist but says he will do so soon - likely needs psk9 as intolerant to statin

## 2022-09-15 ENCOUNTER — OFFICE VISIT (OUTPATIENT)
Dept: PSYCHIATRY | Facility: CLINIC | Age: 57
End: 2022-09-15
Payer: OTHER GOVERNMENT

## 2022-09-15 DIAGNOSIS — F43.10 PTSD (POST-TRAUMATIC STRESS DISORDER): Primary | ICD-10-CM

## 2022-09-15 DIAGNOSIS — F41.1 GENERALIZED ANXIETY DISORDER: ICD-10-CM

## 2022-09-15 PROCEDURE — 90837 PSYTX W PT 60 MINUTES: CPT | Mod: ,,, | Performed by: SOCIAL WORKER

## 2022-09-15 PROCEDURE — 90837 PR PSYCHOTHERAPY W/PATIENT, 60 MIN: ICD-10-PCS | Mod: ,,, | Performed by: SOCIAL WORKER

## 2022-09-15 NOTE — PROGRESS NOTES
Individual Psychotherapy (PhD/LCSW)    9/15/2022    Site:  [facility]         Therapeutic Intervention: Met with patient.  Outpatient - Insight oriented psychotherapy 60 min - CPT code 29497, Outpatient - Behavior modifying psychotherapy 60 min - CPT code 90932, Outpatient - Supportive psychotherapy 60 min - CPT Code 09954, and Outpatient - Interactive psychotherapy 60 min - CPT code 21589    Chief complaint/reason for encounter: ptsd, anxiety     Interval History of present illness: Pt is a 56 yo male who presents today for f/u visit with LCSW. Pt initially established psychotherapy in order to address his dx of PTSD, ADHD, anxiety Pt reports mental health history hx of has participated in counseling/psychotherapy on an outpatient basis in the past and currently under psychiatric care. Pt currently prescribed Adderall. Pt is established with NICOLETTE Marks. No SI/HI    Pt presents in office.  Pt states he felt better after last session. He has been able to reflect more on his reactions with his PTSD and be more accepting of himelsef. Pt discussed stigma of mental health follow up within the  and is continually getting more comfortable with the thought. Engaged in discussion on pt's hx and and events that he felt were traumatic. Assisted with processing and discussed triggers. Engaged in active discussion, constructive processing, support, feedback, and re-framing. Pt fully engaged.  Pt receptive to psychotherapy. Wishes to return as scheduled.       Treatment plan:  Target symptoms:  PTSD, anxiety  Why chosen therapy is appropriate versus another modality: relevant to diagnosis, patient responds to this modality, evidence based practice  Outcome monitoring methods: self-report, observation  Therapeutic intervention type: insight oriented psychotherapy, behavior modifying psychotherapy, supportive psychotherapy, interactive psychotherapy    Risk parameters:  Patient reports no suicidal  ideation  Patient reports no homicidal ideation  Patient reports no self-injurious behavior  Patient reports no violent behavior    Verbal deficits: None    Patient's response to intervention:  The patient's response to intervention is accepting.    Progress toward goals and other mental status changes:  The patient's progress toward goals is good.    Diagnosis:     ICD-10-CM ICD-9-CM   1. PTSD (post-traumatic stress disorder)  F43.10 309.81   2. Generalized anxiety disorder  F41.1 300.02       Plan:  individual psychotherapy    Return to Clinic: as scheduled    Length of Service (minutes): 60

## 2022-09-29 ENCOUNTER — TELEPHONE (OUTPATIENT)
Dept: ORTHOPEDICS | Facility: CLINIC | Age: 57
End: 2022-09-29
Payer: OTHER GOVERNMENT

## 2022-09-29 ENCOUNTER — OFFICE VISIT (OUTPATIENT)
Dept: ORTHOPEDICS | Facility: CLINIC | Age: 57
End: 2022-09-29
Payer: OTHER GOVERNMENT

## 2022-09-29 ENCOUNTER — OFFICE VISIT (OUTPATIENT)
Dept: PSYCHIATRY | Facility: CLINIC | Age: 57
End: 2022-09-29
Payer: OTHER GOVERNMENT

## 2022-09-29 ENCOUNTER — HOSPITAL ENCOUNTER (OUTPATIENT)
Dept: RADIOLOGY | Facility: HOSPITAL | Age: 57
Discharge: HOME | End: 2022-09-29
Attending: ORTHOPAEDIC SURGERY
Payer: OTHER GOVERNMENT

## 2022-09-29 VITALS — HEIGHT: 69 IN | BODY MASS INDEX: 27.75 KG/M2 | WEIGHT: 187.38 LBS

## 2022-09-29 DIAGNOSIS — F43.10 PTSD (POST-TRAUMATIC STRESS DISORDER): Primary | ICD-10-CM

## 2022-09-29 DIAGNOSIS — M79.89 FINGER SWELLING: ICD-10-CM

## 2022-09-29 DIAGNOSIS — F90.2 ATTENTION DEFICIT HYPERACTIVITY DISORDER (ADHD), COMBINED TYPE: ICD-10-CM

## 2022-09-29 DIAGNOSIS — F41.1 GENERALIZED ANXIETY DISORDER: ICD-10-CM

## 2022-09-29 DIAGNOSIS — M20.011 MALLET FINGER OF RIGHT HAND: ICD-10-CM

## 2022-09-29 DIAGNOSIS — M79.89 FINGER SWELLING: Primary | ICD-10-CM

## 2022-09-29 PROCEDURE — 99203 OFFICE O/P NEW LOW 30 MIN: CPT | Mod: S$PBB,,, | Performed by: ORTHOPAEDIC SURGERY

## 2022-09-29 PROCEDURE — 90837 PR PSYCHOTHERAPY W/PATIENT, 60 MIN: ICD-10-PCS | Mod: ,,, | Performed by: SOCIAL WORKER

## 2022-09-29 PROCEDURE — 90837 PSYTX W PT 60 MINUTES: CPT | Mod: ,,, | Performed by: SOCIAL WORKER

## 2022-09-29 PROCEDURE — 99213 OFFICE O/P EST LOW 20 MIN: CPT | Mod: PBBFAC,PN | Performed by: ORTHOPAEDIC SURGERY

## 2022-09-29 PROCEDURE — 99999 PR PBB SHADOW E&M-EST. PATIENT-LVL I: ICD-10-PCS | Mod: PBBFAC,,, | Performed by: SOCIAL WORKER

## 2022-09-29 PROCEDURE — 73140 X-RAY EXAM OF FINGER(S): CPT | Mod: 26,RT,, | Performed by: RADIOLOGY

## 2022-09-29 PROCEDURE — 73140 XR FINGER 2 OR MORE VIEWS: ICD-10-PCS | Mod: 26,RT,, | Performed by: RADIOLOGY

## 2022-09-29 PROCEDURE — 99211 OFF/OP EST MAY X REQ PHY/QHP: CPT | Mod: PBBFAC,27 | Performed by: SOCIAL WORKER

## 2022-09-29 PROCEDURE — 99999 PR PBB SHADOW E&M-EST. PATIENT-LVL I: CPT | Mod: PBBFAC,,, | Performed by: SOCIAL WORKER

## 2022-09-29 PROCEDURE — 99203 PR OFFICE/OUTPT VISIT, NEW, LEVL III, 30-44 MIN: ICD-10-PCS | Mod: S$PBB,,, | Performed by: ORTHOPAEDIC SURGERY

## 2022-09-29 PROCEDURE — 73140 X-RAY EXAM OF FINGER(S): CPT | Mod: TC,PN

## 2022-09-29 PROCEDURE — 99999 PR PBB SHADOW E&M-EST. PATIENT-LVL III: CPT | Mod: PBBFAC,,, | Performed by: ORTHOPAEDIC SURGERY

## 2022-09-29 PROCEDURE — 99999 PR PBB SHADOW E&M-EST. PATIENT-LVL III: ICD-10-PCS | Mod: PBBFAC,,, | Performed by: ORTHOPAEDIC SURGERY
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
## 2022-09-29 NOTE — PROGRESS NOTES
Subjective:      Patient ID: Brice Byrd is a 57 y.o. male.    Chief Complaint: Consult, Hand Pain (bilateral ), and Finger Pain (left Thumb )      HPI  Brice Byrd is a  57 y.o. male presenting today for sudden loss of extension of the right small finger.  There was not a history of trauma.  Onset of symptoms began 3 weeks ago   Minimal pain   Slight swelling reported   .      Review of patient's allergies indicates:  No Known Allergies      Current Outpatient Medications   Medication Sig Dispense Refill    dextroamphetamine-amphetamine (ADDERALL XR) 10 MG 24 hr capsule 1 capsule every afternoon 30 capsule 0    telmisartan (MICARDIS) 80 MG Tab Take 1 tablet (80 mg total) by mouth once daily. 90 tablet 3    chlorthalidone (HYGROTEN) 25 MG Tab TAKE 1/2 TABLET(12.5 MG) BY MOUTH EVERY DAY (Patient not taking: Reported on 9/29/2022) 45 tablet 3    [START ON 10/16/2022] dextroamphetamine-amphetamine (ADDERALL XR) 10 MG 24 hr capsule 1 capsule every afternoon (Patient not taking: Reported on 9/29/2022) 30 capsule 0    [START ON 11/15/2022] dextroamphetamine-amphetamine (ADDERALL XR) 10 MG 24 hr capsule 1 capsule every afternoon 30 capsule 0    dextroamphetamine-amphetamine (ADDERALL XR) 20 MG 24 hr capsule Take 1 capsule (20 mg total) by mouth every morning. (Patient not taking: Reported on 9/29/2022) 30 capsule 0    [START ON 10/16/2022] dextroamphetamine-amphetamine (ADDERALL XR) 20 MG 24 hr capsule Take 1 capsule (20 mg total) by mouth every morning. (Patient not taking: Reported on 9/29/2022) 30 capsule 0    [START ON 11/15/2022] dextroamphetamine-amphetamine (ADDERALL XR) 20 MG 24 hr capsule Take 1 capsule (20 mg total) by mouth every morning. (Patient not taking: Reported on 9/29/2022) 30 capsule 0     No current facility-administered medications for this visit.     Facility-Administered Medications Ordered in Other Visits   Medication Dose Route Frequency Provider Last Rate Last Admin    sodium chloride  "0.9% bolus 1,000 mL  1,000 mL Intravenous Once Li Stephen NP           Past Medical History:   Diagnosis Date    ADHD (attention deficit hyperactivity disorder)     Anxiety     Hx of psychiatric care     Hypertension     Psychiatric problem     PTSD (post-traumatic stress disorder)     SVT (supraventricular tachycardia)     Therapy        Past Surgical History:   Procedure Laterality Date    ABLATION N/A 2/18/2022    Procedure: Ablation;  Surgeon: Al Guerra MD;  Location: [facility] EP LAB;  Service: Cardiology;  Laterality: N/A;  SVT, RFA, JERONIMO, anes, MB, 3prep    VASECTOMY         Review of Systems:  ROS    OBJECTIVE:     PHYSICAL EXAM:  Height: 5' 9" (175.3 cm) Weight: 85 kg (187 lb 6.3 oz)  Vitals:    09/29/22 1311   Weight: 85 kg (187 lb 6.3 oz)   Height: 5' 9" (1.753 m)   PainSc:   6   PainLoc: Generalized     Well developed, well nourished male in no acute distress  Alert and oriented x 3  HEENT- Normal exam  Lungs- Clear to auscultation  Heart- Regular rate and rhythm  Abdomen- Soft nontender  Extremity exam- examination right hand demonstrates flexed position to the right small finger at the D IP level   No active extension is possible   Passive extension is intact but slightly painful  Sensation intact skin intact    RADIOGRAPHS:  AP lateral x-ray right small finger demonstrate no bony abnormalities  Comments: I have personally reviewed the imaging and I agree with the above radiologist's report.    ASSESSMENT/PLAN:     IMPRESSION:  Mallet injury right small finger    PLAN:  I explained the nature of the injury to the patient   Recommended a Stax splint full-time for the next 2-3 weeks including bathing   I have shown him how to deal with the water and skin issue   No heavy lifting  Follow-up 2-3 weeks       - We talked at length about the anatomy and pathophysiology of   Encounter Diagnosis   Name Primary?    Mallet finger of right hand            Disclaimer: This note has been generated using " voice-recognition software. There may be typographical errors that have been missed during proof-reading.

## 2022-10-06 ENCOUNTER — OFFICE VISIT (OUTPATIENT)
Dept: PSYCHIATRY | Facility: CLINIC | Age: 57
End: 2022-10-06
Payer: OTHER GOVERNMENT

## 2022-10-06 DIAGNOSIS — F41.1 GENERALIZED ANXIETY DISORDER: ICD-10-CM

## 2022-10-06 DIAGNOSIS — F43.10 PTSD (POST-TRAUMATIC STRESS DISORDER): Primary | ICD-10-CM

## 2022-10-06 PROCEDURE — 90837 PSYTX W PT 60 MINUTES: CPT | Mod: ,,, | Performed by: SOCIAL WORKER

## 2022-10-06 PROCEDURE — 90837 PR PSYCHOTHERAPY W/PATIENT, 60 MIN: ICD-10-PCS | Mod: ,,, | Performed by: SOCIAL WORKER
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
## 2022-10-07 ENCOUNTER — TELEPHONE (OUTPATIENT)
Dept: ORTHOPEDICS | Facility: CLINIC | Age: 57
End: 2022-10-07
Payer: OTHER GOVERNMENT

## 2022-10-07 NOTE — TELEPHONE ENCOUNTER
Spoke with Denny Rochelle regarding his Stax finger splint that he received on 9/29. Patient states the splint is too big and would like to have splint adjusted or fitted for a new splint. I informed the patient that he may have some wiggle room in the splint, but if he would like he was welcomed to come in to have the splint adjusted. Patient stated he will be in this afternoon. All questions answered and patient verbalized understanding.

## 2022-10-13 ENCOUNTER — OFFICE VISIT (OUTPATIENT)
Dept: PSYCHIATRY | Facility: CLINIC | Age: 57
End: 2022-10-13
Payer: OTHER GOVERNMENT

## 2022-10-13 DIAGNOSIS — F41.1 GENERALIZED ANXIETY DISORDER: ICD-10-CM

## 2022-10-13 DIAGNOSIS — F90.2 ATTENTION DEFICIT HYPERACTIVITY DISORDER (ADHD), COMBINED TYPE: ICD-10-CM

## 2022-10-13 DIAGNOSIS — F43.10 PTSD (POST-TRAUMATIC STRESS DISORDER): Primary | ICD-10-CM

## 2022-10-13 PROCEDURE — 90837 PR PSYCHOTHERAPY W/PATIENT, 60 MIN: ICD-10-PCS | Mod: ,,, | Performed by: SOCIAL WORKER

## 2022-10-13 PROCEDURE — 90837 PSYTX W PT 60 MINUTES: CPT | Mod: ,,, | Performed by: SOCIAL WORKER

## 2022-10-13 NOTE — PROGRESS NOTES
Individual Psychotherapy (PhD/KURTISW)    10/13/2022    Site:  [facility]         Therapeutic Intervention: Met with patient.  Outpatient - Insight oriented psychotherapy 60 min - CPT code 66580, Outpatient - Behavior modifying psychotherapy 60 min - CPT code 24455, Outpatient - Supportive psychotherapy 60 min - CPT Code 18651, and Outpatient - Interactive psychotherapy 60 min - CPT code 65755    Chief complaint/reason for encounter: ptsd, anxiety     Interval History of present illness: Pt is a 56 yo male who [facility] Pt initially established psychotherapy in order to address his dx of PTSD, ADHD, anxiety Pt reports mental health history hx of has participated in counseling/psychotherapy on an outpatient basis in the past and currently under psychiatric care. Pt currently prescribed Adderall. Pt is established with NICOLETTE Marks.     Pt presents in office.  Continued to process hx of trauma. Pt was able to open up more and talk about some of the experiences in his past. He notes he only had 2 nightmares and only recalled one. Reviewed nightmares and themes. Engaged in active discussion an assisted with processing emotion, thought processes, and the identification of triggers. Provided support, feedback, validation and re-framing. Pt fully engaged.  Discussed IRT therapy for nightmares. Pt receptive. Log outline provide Pt remains receptive to psychotherapy. Wishes to return as scheduled.       Treatment plan:  Target symptoms:  PTSD, anxiety  Why chosen therapy is appropriate versus another modality: relevant to diagnosis, patient responds to this modality, evidence based practice  Outcome monitoring methods: self-report, observation  Therapeutic intervention type: insight oriented psychotherapy, behavior modifying psychotherapy, supportive psychotherapy, interactive psychotherapy    Risk parameters:  Patient reports no suicidal ideation  Patient reports no homicidal  ideation  Patient reports no self-injurious behavior  Patient reports no violent behavior    Verbal deficits: None    Patient's response to intervention:  The patient's response to intervention is accepting.    Progress toward goals and other mental status changes:  The patient's progress toward goals is good.    Diagnosis:     ICD-10-CM ICD-9-CM   1. PTSD (post-traumatic stress disorder)  F43.10 309.81   2. Generalized anxiety disorder  F41.1 300.02   3. Attention deficit hyperactivity disorder (ADHD), combined type  F90.2 314.01           Plan:  individual psychotherapy    Return to Clinic: as scheduled    Length of Service (minutes): 60

## 2022-10-20 ENCOUNTER — OFFICE VISIT (OUTPATIENT)
Dept: PSYCHIATRY | Facility: CLINIC | Age: 57
End: 2022-10-20
Payer: OTHER GOVERNMENT

## 2022-10-20 DIAGNOSIS — F43.10 PTSD (POST-TRAUMATIC STRESS DISORDER): Primary | ICD-10-CM

## 2022-10-20 DIAGNOSIS — F90.2 ATTENTION DEFICIT HYPERACTIVITY DISORDER (ADHD), COMBINED TYPE: ICD-10-CM

## 2022-10-20 DIAGNOSIS — F41.1 GENERALIZED ANXIETY DISORDER: ICD-10-CM

## 2022-10-20 PROCEDURE — 90837 PSYTX W PT 60 MINUTES: CPT | Mod: ,,, | Performed by: SOCIAL WORKER

## 2022-10-20 PROCEDURE — 90837 PR PSYCHOTHERAPY W/PATIENT, 60 MIN: ICD-10-PCS | Mod: ,,, | Performed by: SOCIAL WORKER

## 2022-10-20 NOTE — PROGRESS NOTES
Individual Psychotherapy (PhD/KURTISW)    10/20/2022    Site:  [facility]         Therapeutic Intervention: Met with patient.  Outpatient - Insight oriented psychotherapy 60 min - CPT code 41642, Outpatient - Behavior modifying psychotherapy 60 min - CPT code 15028, Outpatient - Supportive psychotherapy 60 min - CPT Code 10705, and Outpatient - Interactive psychotherapy 60 min - CPT code 83624    Chief complaint/reason for encounter: ptsd, anxiety     Interval History of present illness: Pt is a 56 yo male who [facility] Pt initially established psychotherapy in order to address his dx of PTSD, ADHD, anxiety Pt reports mental health history hx of has participated in counseling/psychotherapy on an outpatient basis in the past and currently under psychiatric care. Pt currently prescribed Adderall. Pt is established with NICOLETTE Marks.     Pt presents in office.  Continued to process hx of trauma. Pt was able to open up more and talk about some of the experiences in his past. He notes he only had 2 nightmares and only recalled one. Engaged in trauma processing and discussed dream. Nightmares consist of images from the time he served.  Engaged in active discussion an assisted with processing emotion, thought processes, and the identification of triggers. Provided support, feedback, validation and re-framing. Pt fully engaged.  Discussed IRT therapy for nightmares. Pt receptive. Log outline provide Pt remains receptive to psychotherapy. Wishes to return as scheduled.       Treatment plan:  Target symptoms:  PTSD, anxiety  Why chosen therapy is appropriate versus another modality: relevant to diagnosis, patient responds to this modality, evidence based practice  Outcome monitoring methods: self-report, observation  Therapeutic intervention type: insight oriented psychotherapy, behavior modifying psychotherapy, supportive psychotherapy, interactive psychotherapy    Risk  parameters:  Patient reports no suicidal ideation  Patient reports no homicidal ideation  Patient reports no self-injurious behavior  Patient reports no violent behavior    Verbal deficits: None    Patient's response to intervention:  The patient's response to intervention is accepting.    Progress toward goals and other mental status changes:  The patient's progress toward goals is good.    Diagnosis:     ICD-10-CM ICD-9-CM   1. PTSD (post-traumatic stress disorder)  F43.10 309.81   2. Generalized anxiety disorder  F41.1 300.02   3. Attention deficit hyperactivity disorder (ADHD), combined type  F90.2 314.01           Plan:  individual psychotherapy    Return to Clinic: as scheduled    Length of Service (minutes): 60                   WDL

## 2022-10-24 ENCOUNTER — OFFICE VISIT (OUTPATIENT)
Dept: ORTHOPEDICS | Facility: CLINIC | Age: 57
End: 2022-10-24
Payer: OTHER GOVERNMENT

## 2022-10-24 DIAGNOSIS — M20.011 MALLET FINGER OF RIGHT HAND: Primary | ICD-10-CM

## 2022-10-24 PROCEDURE — 99213 OFFICE O/P EST LOW 20 MIN: CPT | Mod: S$PBB,,, | Performed by: ORTHOPAEDIC SURGERY

## 2022-10-24 PROCEDURE — 99999 PR PBB SHADOW E&M-EST. PATIENT-LVL III: CPT | Mod: PBBFAC,,, | Performed by: ORTHOPAEDIC SURGERY

## 2022-10-24 PROCEDURE — 99999 PR PBB SHADOW E&M-EST. PATIENT-LVL III: ICD-10-PCS | Mod: PBBFAC,,, | Performed by: ORTHOPAEDIC SURGERY

## 2022-10-24 PROCEDURE — 99213 OFFICE O/P EST LOW 20 MIN: CPT | Mod: PBBFAC,PN | Performed by: ORTHOPAEDIC SURGERY

## 2022-10-24 PROCEDURE — 99213 PR OFFICE/OUTPT VISIT, EST, LEVL III, 20-29 MIN: ICD-10-PCS | Mod: S$PBB,,, | Performed by: ORTHOPAEDIC SURGERY

## 2022-10-24 NOTE — PROGRESS NOTES
Subjective:      Patient ID: Brice Byrd is a 57 y.o. male.    Chief Complaint: Pain of the Left Hand and Pain of the Right Hand      HPI  Brice Byrd is a  57 y.o. male presenting today for follow-up of mallet finger.  There was not a history of trauma.  Onset of symptoms began 7 weeks ago.  Patient previously saw Paramjit Combs junior, MD 3 weeks ago who placed him in a DIP splint which he has worn at all times for the past 4 weeks.  Minimal pain   Slight swelling reported   .      Review of patient's allergies indicates:  No Known Allergies      Current Outpatient Medications   Medication Sig Dispense Refill    chlorthalidone (HYGROTEN) 25 MG Tab TAKE 1/2 TABLET(12.5 MG) BY MOUTH EVERY DAY (Patient not taking: No sig reported) 45 tablet 3    dextroamphetamine-amphetamine (ADDERALL XR) 10 MG 24 hr capsule 1 capsule every afternoon (Patient not taking: Reported on 10/24/2022) 30 capsule 0    dextroamphetamine-amphetamine (ADDERALL XR) 10 MG 24 hr capsule 1 capsule every afternoon (Patient not taking: No sig reported) 30 capsule 0    [START ON 11/15/2022] dextroamphetamine-amphetamine (ADDERALL XR) 10 MG 24 hr capsule 1 capsule every afternoon (Patient not taking: Reported on 10/24/2022) 30 capsule 0    dextroamphetamine-amphetamine (ADDERALL XR) 20 MG 24 hr capsule Take 1 capsule (20 mg total) by mouth every morning. (Patient not taking: No sig reported) 30 capsule 0    dextroamphetamine-amphetamine (ADDERALL XR) 20 MG 24 hr capsule Take 1 capsule (20 mg total) by mouth every morning. (Patient not taking: No sig reported) 30 capsule 0    [START ON 11/15/2022] dextroamphetamine-amphetamine (ADDERALL XR) 20 MG 24 hr capsule Take 1 capsule (20 mg total) by mouth every morning. (Patient not taking: No sig reported) 30 capsule 0    telmisartan (MICARDIS) 80 MG Tab Take 1 tablet (80 mg total) by mouth once daily. 90 tablet 3     No current facility-administered medications for this visit.      Facility-Administered Medications Ordered in Other Visits   Medication Dose Route Frequency Provider Last Rate Last Admin    sodium chloride 0.9% bolus 1,000 mL  1,000 mL Intravenous Once Li Stephen NP           Past Medical History:   Diagnosis Date    ADHD (attention deficit hyperactivity disorder)     Anxiety     Hx of psychiatric care     Hypertension     Psychiatric problem     PTSD (post-traumatic stress disorder)     SVT (supraventricular tachycardia)     Therapy        Past Surgical History:   Procedure Laterality Date    ABLATION N/A 2/18/2022    Procedure: Ablation;  Surgeon: Al Guerra MD;  Location: [facility] EP LAB;  Service: Cardiology;  Laterality: N/A;  SVT, RFA, JERONIMO, anes, MB, 3prep    VASECTOMY         Review of Systems:  ROS    OBJECTIVE:     PHYSICAL EXAM:       Vitals:    10/24/22 1328   PainSc:   4     Well developed, well nourished male in no acute distress  Alert and oriented x 3  HEENT- Normal exam  Lungs- Clear to auscultation  Heart- Regular rate and rhythm  Abdomen- Soft nontender  Extremity exam- examination right hand demonstrates full extension of the right small finger at the DIP level.  Limited flexion of DIP  No active extension is possible   Passive extension is intact but slightly painful  Sensation intact skin intact    RADIOGRAPHS:  Previous AP lateral x-ray right small finger demonstrate no bony abnormalities  Comments: I have personally reviewed the imaging and I agree with the above radiologist's report.    ASSESSMENT/PLAN:     IMPRESSION:  Mallet injury right small finger    PLAN:  I explained the nature of the injury to the patient   Recommended weaning out of the splint over the next few days and work on passive ROM to achieve full flexion.  He may utilize his other hand to help achieve this.  No heavy lifting  Follow-up 2 to reassess ROM.       - We talked at length about the anatomy and pathophysiology of   No diagnosis found.          Disclaimer: This note  has been generated using voice-recognition software. There may be typographical errors that have been missed during proof-reading.

## 2022-10-25 PROBLEM — F43.10 PTSD (POST-TRAUMATIC STRESS DISORDER): Status: ACTIVE | Noted: 2022-10-25

## 2022-10-25 PROBLEM — F41.1 GENERALIZED ANXIETY DISORDER: Status: ACTIVE | Noted: 2022-10-25

## 2022-11-02 ENCOUNTER — OFFICE VISIT (OUTPATIENT)
Dept: PSYCHIATRY | Facility: CLINIC | Age: 57
End: 2022-11-02
Payer: OTHER GOVERNMENT

## 2022-11-02 DIAGNOSIS — F43.10 PTSD (POST-TRAUMATIC STRESS DISORDER): Primary | ICD-10-CM

## 2022-11-02 DIAGNOSIS — T14.8XXA EXCORIATION: ICD-10-CM

## 2022-11-02 PROCEDURE — 90837 PSYTX W PT 60 MINUTES: CPT | Mod: ,,, | Performed by: SOCIAL WORKER

## 2022-11-02 PROCEDURE — 90837 PR PSYCHOTHERAPY W/PATIENT, 60 MIN: ICD-10-PCS | Mod: ,,, | Performed by: SOCIAL WORKER

## 2022-11-02 NOTE — PROGRESS NOTES
"Individual Psychotherapy (PhD/LCSW)    11/2/2022    Site:  [facility]         Therapeutic Intervention: Met with patient.  Outpatient - Insight oriented psychotherapy 60 min - CPT code 34690, Outpatient - Behavior modifying psychotherapy 60 min - CPT code 49843, Outpatient - Supportive psychotherapy 60 min - CPT Code 34089, and Outpatient - Interactive psychotherapy 60 min - CPT code 68580    Chief complaint/reason for encounter: ptsd, anxiety     Interval History of present illness: Pt is a 56 yo male who [facility] Pt initially established psychotherapy in order to address his dx of PTSD, ADHD, anxiety Pt reports mental health history hx of has participated in counseling/psychotherapy on an outpatient basis in the past and currently under psychiatric care. Pt currently prescribed Adderall. Pt is established with NICOLETTE Marks.     Pt presents in office.  Last visit was 2 weeks ago. Continued to process hx of trauma. 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 Pt also continued to discuss his time in the  when he served for [employer]. Also discussed his hx of anger. Pt note he has been in the past "hot headed." Continued exploration and processing of feelings. He has been working on picking the skin on his hands by wearing gloves. Does not report any recent nightmares that he can remember.  Engaged in active discussion an assisted with processing emotion, thought processes, and the identification of triggers. Provided support, feedback, validation and re-framing. Pt fully engaged.  Discussed IRT therapy for nightmares. Pt receptive. Log outline provide Pt remains receptive to psychotherapy. Wishes to return as scheduled.       Treatment plan:  Target symptoms:  PTSD, anxiety  Why chosen therapy is appropriate versus another modality: relevant to diagnosis, patient responds to this modality, evidence based practice  Outcome monitoring methods: self-report, " observation  Therapeutic intervention type: insight oriented psychotherapy, behavior modifying psychotherapy, supportive psychotherapy, interactive psychotherapy    Risk parameters:  Patient reports no suicidal ideation  Patient reports no homicidal ideation  Patient reports no self-injurious behavior  Patient reports no violent behavior    Verbal deficits: None    Patient's response to intervention:  The patient's response to intervention is accepting.    Progress toward goals and other mental status changes:  The patient's progress toward goals is good.    Diagnosis:     ICD-10-CM ICD-9-CM   1. PTSD (post-traumatic stress disorder)  F43.10 309.81   2. Irritability and anger  R45.4 799.22         Plan:  individual psychotherapy    Return to Clinic: as scheduled    Length of Service (minutes): 60

## 2022-11-09 ENCOUNTER — PATIENT MESSAGE (OUTPATIENT)
Dept: PSYCHIATRY | Facility: CLINIC | Age: 57
End: 2022-11-09
Payer: OTHER GOVERNMENT

## 2022-12-08 ENCOUNTER — OFFICE VISIT (OUTPATIENT)
Dept: PSYCHIATRY | Facility: CLINIC | Age: 57
End: 2022-12-08
Payer: OTHER GOVERNMENT

## 2022-12-08 DIAGNOSIS — T14.8XXA EXCORIATION: ICD-10-CM

## 2022-12-08 DIAGNOSIS — F43.10 PTSD (POST-TRAUMATIC STRESS DISORDER): Primary | ICD-10-CM

## 2022-12-08 DIAGNOSIS — F41.1 GENERALIZED ANXIETY DISORDER: ICD-10-CM

## 2022-12-08 PROCEDURE — 90837 PSYTX W PT 60 MINUTES: CPT | Mod: ,,, | Performed by: SOCIAL WORKER

## 2022-12-08 PROCEDURE — 90837 PR PSYCHOTHERAPY W/PATIENT, 60 MIN: ICD-10-PCS | Mod: ,,, | Performed by: SOCIAL WORKER
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
## 2022-12-14 ENCOUNTER — OFFICE VISIT (OUTPATIENT)
Dept: INTERNAL MEDICINE | Facility: CLINIC | Age: 57
End: 2022-12-14
Payer: OTHER GOVERNMENT

## 2022-12-14 VITALS
SYSTOLIC BLOOD PRESSURE: 128 MMHG | OXYGEN SATURATION: 100 % | HEIGHT: 69 IN | BODY MASS INDEX: 27.4 KG/M2 | DIASTOLIC BLOOD PRESSURE: 86 MMHG | WEIGHT: 185 LBS | HEART RATE: 82 BPM

## 2022-12-14 DIAGNOSIS — M20.001 FINGER DEFORMITY, RIGHT: ICD-10-CM

## 2022-12-14 DIAGNOSIS — Z23 NEED FOR PNEUMOCOCCAL VACCINATION: ICD-10-CM

## 2022-12-14 DIAGNOSIS — F90.2 ATTENTION DEFICIT HYPERACTIVITY DISORDER (ADHD), COMBINED TYPE: Primary | ICD-10-CM

## 2022-12-14 DIAGNOSIS — E78.2 MIXED HYPERLIPIDEMIA: ICD-10-CM

## 2022-12-14 DIAGNOSIS — I10 PRIMARY HYPERTENSION: ICD-10-CM

## 2022-12-14 PROCEDURE — 99214 OFFICE O/P EST MOD 30 MIN: CPT | Mod: S$PBB,,, | Performed by: FAMILY MEDICINE

## 2022-12-14 PROCEDURE — 99999 PR PBB SHADOW E&M-EST. PATIENT-LVL IV: ICD-10-PCS | Mod: PBBFAC,,, | Performed by: FAMILY MEDICINE

## 2022-12-14 PROCEDURE — 99999 PR PBB SHADOW E&M-EST. PATIENT-LVL IV: CPT | Mod: PBBFAC,,, | Performed by: FAMILY MEDICINE

## 2022-12-14 PROCEDURE — 99214 OFFICE O/P EST MOD 30 MIN: CPT | Mod: PBBFAC | Performed by: FAMILY MEDICINE

## 2022-12-14 PROCEDURE — 99214 PR OFFICE/OUTPT VISIT, EST, LEVL IV, 30-39 MIN: ICD-10-PCS | Mod: S$PBB,,, | Performed by: FAMILY MEDICINE

## 2022-12-14 RX ORDER — DEXTROAMPHETAMINE SACCHARATE, AMPHETAMINE ASPARTATE MONOHYDRATE, DEXTROAMPHETAMINE SULFATE AND AMPHETAMINE SULFATE 6.25; 6.25; 6.25; 6.25 MG/1; MG/1; MG/1; MG/1
25 CAPSULE, EXTENDED RELEASE ORAL EVERY MORNING
Qty: 30 CAPSULE | Refills: 0 | Status: SHIPPED | OUTPATIENT
Start: 2023-02-14 | End: 2023-03-14

## 2022-12-14 RX ORDER — DEXTROAMPHETAMINE SACCHARATE, AMPHETAMINE ASPARTATE MONOHYDRATE, DEXTROAMPHETAMINE SULFATE AND AMPHETAMINE SULFATE 6.25; 6.25; 6.25; 6.25 MG/1; MG/1; MG/1; MG/1
25 CAPSULE, EXTENDED RELEASE ORAL EVERY MORNING
Qty: 30 CAPSULE | Refills: 0 | Status: SHIPPED | OUTPATIENT
Start: 2023-01-15 | End: 2023-03-14

## 2022-12-14 RX ORDER — DEXTROAMPHETAMINE SACCHARATE, AMPHETAMINE ASPARTATE MONOHYDRATE, DEXTROAMPHETAMINE SULFATE AND AMPHETAMINE SULFATE 2.5; 2.5; 2.5; 2.5 MG/1; MG/1; MG/1; MG/1
CAPSULE, EXTENDED RELEASE ORAL
Qty: 30 CAPSULE | Refills: 0 | Status: SHIPPED | OUTPATIENT
Start: 2023-02-14 | End: 2023-03-14

## 2022-12-14 RX ORDER — DEXTROAMPHETAMINE SACCHARATE, AMPHETAMINE ASPARTATE MONOHYDRATE, DEXTROAMPHETAMINE SULFATE AND AMPHETAMINE SULFATE 6.25; 6.25; 6.25; 6.25 MG/1; MG/1; MG/1; MG/1
25 CAPSULE, EXTENDED RELEASE ORAL EVERY MORNING
Qty: 30 CAPSULE | Refills: 0 | Status: SHIPPED | OUTPATIENT
Start: 2022-12-16 | End: 2023-03-14

## 2022-12-14 RX ORDER — DEXTROAMPHETAMINE SACCHARATE, AMPHETAMINE ASPARTATE MONOHYDRATE, DEXTROAMPHETAMINE SULFATE AND AMPHETAMINE SULFATE 2.5; 2.5; 2.5; 2.5 MG/1; MG/1; MG/1; MG/1
CAPSULE, EXTENDED RELEASE ORAL
Qty: 30 CAPSULE | Refills: 0 | Status: SHIPPED | OUTPATIENT
Start: 2022-12-16 | End: 2023-03-14

## 2022-12-14 RX ORDER — DEXTROAMPHETAMINE SACCHARATE, AMPHETAMINE ASPARTATE MONOHYDRATE, DEXTROAMPHETAMINE SULFATE AND AMPHETAMINE SULFATE 2.5; 2.5; 2.5; 2.5 MG/1; MG/1; MG/1; MG/1
CAPSULE, EXTENDED RELEASE ORAL
Qty: 30 CAPSULE | Refills: 0 | Status: SHIPPED | OUTPATIENT
Start: 2023-01-15 | End: 2023-03-14

## 2022-12-14 NOTE — PROGRESS NOTES
"Subjective:       Patient ID: Brice Byrd is a 57 y.o. male.    Chief Complaint: Follow-up    Patient is here for follow-up of their chronic diseases  Patient is here for follow-up of ADHD  Retired from  recently  In his 40s had extensive testing and Dx ADHD and on adderall 20 AM, 10 afternoon with excellent results on thinking and QoL, has about 30d left on his terminal Rx form [employer]. So will give him 30d Rx to fill on 7/15 and another for 8/15. Then will see him on 9/15 and q 3 mo thereafter.  There is a 2/24/22 letter from [facility] saying:   "Mr. Brice Byrd is a patient of [facility] and his physician Dr. Al Guerra is aware that he takes Adderall 20 mg as needed and notes that this medication will not affect his SVT heart condition."    He finds the 20mg in morning wearing off too soon, would like to increase to 25mg  Leave the afternoon dose at 10mg   checked  No ref flags    Needs general cardiology appt  COE=144 direct measurement  Cannot use statins, intolerance + liver issues  Prob repatha, needs cardiology evaluation     Needs eye doc appt for borderline open angle glaucoma, could be contraindication for stimulants, must be monitored by eye docs      Review of patient's allergies indicates:  No Known Allergies    Social History     Tobacco Use    Smoking status: Former    Smokeless tobacco: Never   Substance Use Topics    Alcohol use: Never    Drug use: Never       No family history on file.    Past Surgical History:   Procedure Laterality Date    ABLATION N/A 2/18/2022    Procedure: Ablation;  Surgeon: Al Guerra MD;  Location: [facility];  Service: Cardiology;  Laterality: N/A;  SVT, RFA, JERONIMO, anes, MB, 3prep    VASECTOMY         Patient Active Problem List   Diagnosis    Transaminitis    Syncope and collapse    Primary hypertension    SVT (supraventricular tachycardia)    Mixed hyperlipidemia    Status post catheter ablation of slow pathway    Attention " deficit hyperactivity disorder (ADHD), combined type    Open angle with borderline findings, low risk, bilateral    Fatty liver    Mallet finger of right hand    PTSD (post-traumatic stress disorder)    Generalized anxiety disorder       Current Outpatient Medications on File Prior to Visit   Medication Sig Dispense Refill    chlorthalidone (HYGROTEN) 25 MG Tab TAKE 1/2 TABLET(12.5 MG) BY MOUTH EVERY DAY (Patient not taking: No sig reported) 45 tablet 3    dextroamphetamine-amphetamine (ADDERALL XR) 10 MG 24 hr capsule 1 capsule every afternoon (Patient not taking: Reported on 10/24/2022) 30 capsule 0    dextroamphetamine-amphetamine (ADDERALL XR) 10 MG 24 hr capsule 1 capsule every afternoon (Patient not taking: No sig reported) 30 capsule 0    dextroamphetamine-amphetamine (ADDERALL XR) 10 MG 24 hr capsule 1 capsule every afternoon (Patient not taking: Reported on 10/24/2022) 30 capsule 0    dextroamphetamine-amphetamine (ADDERALL XR) 20 MG 24 hr capsule Take 1 capsule (20 mg total) by mouth every morning. (Patient not taking: No sig reported) 30 capsule 0    dextroamphetamine-amphetamine (ADDERALL XR) 20 MG 24 hr capsule Take 1 capsule (20 mg total) by mouth every morning. (Patient not taking: No sig reported) 30 capsule 0    dextroamphetamine-amphetamine (ADDERALL XR) 20 MG 24 hr capsule Take 1 capsule (20 mg total) by mouth every morning. (Patient not taking: No sig reported) 30 capsule 0    telmisartan (MICARDIS) 80 MG Tab Take 1 tablet (80 mg total) by mouth once daily. 90 tablet 3     Current Facility-Administered Medications on File Prior to Visit   Medication Dose Route Frequency Provider Last Rate Last Admin    sodium chloride 0.9% bolus 1,000 mL  1,000 mL Intravenous Once Li Stephen NP               Review of Systems   Constitutional:  Negative for chills and fever.   HENT:  Negative for ear pain.    Eyes:  Negative for pain.   Respiratory:  Negative for chest tightness.    Cardiovascular:  Negative  "for chest pain.   Gastrointestinal:  Negative for abdominal pain.   Genitourinary:  Negative for flank pain.   Musculoskeletal:  Negative for gait problem.   Neurological:  Negative for syncope.   Psychiatric/Behavioral:  Negative for behavioral problems.      Objective:    /86 (BP Location: Left arm, Patient Position: Sitting, BP Method: Medium (Manual))   Pulse 82   Ht 5' 9" (1.753 m)   Wt 83.9 kg (185 lb)   SpO2 100%   BMI 27.32 kg/m²     Physical Exam  Constitutional:       Appearance: He is well-developed.   HENT:      Head: Normocephalic and atraumatic.   Cardiovascular:      Rate and Rhythm: Normal rate.      Heart sounds: Normal heart sounds.   Pulmonary:      Effort: No respiratory distress.      Breath sounds: Normal breath sounds. No wheezing or rales.   Abdominal:      Palpations: Abdomen is soft.   Musculoskeletal:      Cervical back: Neck supple.   Skin:     General: Skin is dry.   Neurological:      Mental Status: He is alert.   Psychiatric:         Behavior: Behavior normal.       Assessment:       1. Attention deficit hyperactivity disorder (ADHD), combined type    2. Finger deformity, right    3. Mixed hyperlipidemia    4. Need for pneumococcal vaccination    5. Primary hypertension        Plan:       Brice was seen today for follow-up.    Diagnoses and all orders for this visit:    Attention deficit hyperactivity disorder (ADHD), combined type  -     dextroamphetamine-amphetamine (ADDERALL XR) 25 MG 24 hr capsule; Take 1 capsule (25 mg total) by mouth every morning.  -     dextroamphetamine-amphetamine (ADDERALL XR) 25 MG 24 hr capsule; Take 1 capsule (25 mg total) by mouth every morning.  -     dextroamphetamine-amphetamine (ADDERALL XR) 25 MG 24 hr capsule; Take 1 capsule (25 mg total) by mouth every morning. #30, no RF  One Rx for 1 month supply was printed for filling today. A second was printed for filling in 1 month. A third was printed for filling in 2 months. Each Rx was dated " today. F/u in 3 months. No refills are possible by telephone, must be seen in person.   -     dextroamphetamine-amphetamine (ADDERALL XR) 10 MG 24 hr capsule; 1 capsule every afternoon  -     dextroamphetamine-amphetamine (ADDERALL XR) 10 MG 24 hr capsule; 1 capsule every afternoon  -     dextroamphetamine-amphetamine (ADDERALL XR) 10 MG 24 hr capsule; 1 capsule every afternoon #30, no RF  One Rx for 1 month supply was printed for filling today. A second was printed for filling in 1 month. A third was printed for filling in 2 months. Each Rx was dated today. F/u in 3 months. No refills are possible by telephone, must be seen in person.     Finger deformity, right  -     Ambulatory referral/consult to Orthopedics; Future    Mixed hyperlipidemia  -     Ambulatory referral/consult to Cardiology; Future    Need for pneumococcal vaccination  Prevnar-20 today    Primary hypertension  -controlled, stable, no change in meds  Check BMP next visit in 3 mo    Follow up in about 13 weeks (around 3/15/2023) for ADHD meds, K+.

## 2022-12-16 ENCOUNTER — TELEPHONE (OUTPATIENT)
Dept: ORTHOPEDICS | Facility: CLINIC | Age: 57
End: 2022-12-16
Payer: OTHER GOVERNMENT

## 2022-12-16 NOTE — TELEPHONE ENCOUNTER
----- Message from Denisse Upton LPN sent at 12/16/2022 10:16 AM CST -----  Good Morning Rashida,    I just spoke with the Mr. Narvaez, to get the him rescheduled. Patient stated that he was told to follow up with a hand surgeon. I did inform the patient that Dr. Combs is a hand surgeon, the patient wants a second opinion, and would like to keep his appointment with Dr. Mc.     Thanks!  ----- Message -----  From: Rashida Mandujano  Sent: 12/15/2022  11:10 AM CST  To: Garret CHAUDHRY [facility]!    The attached patient is on Dr. Mc's schedule in error - he was seen by Dr. Combs and was supposed to follow up with him - can you guys help him get scheduled appropriately?    Thanks!    Rashida Mandujano MS, OTC  Clinical & OR Assistant to Dr. Anderson Mc  [facility]  [phone]

## 2022-12-16 NOTE — TELEPHONE ENCOUNTER
----- Message from Rashida Mandujano sent at 12/15/2022 11:09 AM CST -----  Hi!    The attached patient is on Dr. Mc's schedule in error - he was seen by Dr. Combs and was supposed to follow up with him - can you guys help him get scheduled appropriately?    Thanks!    Rashida Mandujano, MS, OTC  Clinical & OR Assistant to Dr. Anderson Mc  [facility]  [phone]

## 2022-12-16 NOTE — TELEPHONE ENCOUNTER
Spoke with Mr. Narvaez to get him rescheduled with Dr. Combs, as he was scheduled with Dr. Mc at [facility]. Patient states that he was told by his PCP to see a hand surgeon. I informed the patient that Dr. Combs, is a hand surgeon, but patient requested to get a second opinion from Dr. Mc. Message was sent over to [facility] for patient to keep appointment. All questions answered and patient verbalized understanding.

## 2022-12-19 ENCOUNTER — OFFICE VISIT (OUTPATIENT)
Dept: ORTHOPEDICS | Facility: CLINIC | Age: 57
End: 2022-12-19
Payer: OTHER GOVERNMENT

## 2022-12-19 VITALS — HEIGHT: 69 IN | WEIGHT: 185 LBS | BODY MASS INDEX: 27.4 KG/M2

## 2022-12-19 DIAGNOSIS — M20.001 FINGER DEFORMITY, RIGHT: ICD-10-CM

## 2022-12-19 PROCEDURE — 99999 PR PBB SHADOW E&M-EST. PATIENT-LVL III: ICD-10-PCS | Mod: PBBFAC,,, | Performed by: ORTHOPAEDIC SURGERY

## 2022-12-19 PROCEDURE — 99213 PR OFFICE/OUTPT VISIT, EST, LEVL III, 20-29 MIN: ICD-10-PCS | Mod: S$PBB,,, | Performed by: ORTHOPAEDIC SURGERY

## 2022-12-19 PROCEDURE — 99213 OFFICE O/P EST LOW 20 MIN: CPT | Mod: PBBFAC | Performed by: ORTHOPAEDIC SURGERY

## 2022-12-19 PROCEDURE — 99213 OFFICE O/P EST LOW 20 MIN: CPT | Mod: S$PBB,,, | Performed by: ORTHOPAEDIC SURGERY

## 2022-12-19 PROCEDURE — 99999 PR PBB SHADOW E&M-EST. PATIENT-LVL III: CPT | Mod: PBBFAC,,, | Performed by: ORTHOPAEDIC SURGERY

## 2022-12-19 NOTE — PROGRESS NOTES
[facility]  History & Physical  Orthopedics    SUBJECTIVE:      COVID-19 attestation:  This patient was treated during the COVID-19 pandemic.  This was discussed with the patient, they are aware of our current policies and procedures, were given the option of delaying their visit and or switching to a virtual visit, delaying their surgery when applicable, and they elect to proceed.    Chief Complaint: R SF pain     Referring Provider: Troy Heart MD     History of Present Illness:  Patient is a 57 y.o. right hand dominant male who presents today with complaints of R SF pain for 3 mo after a mallet figner suffered while bowling, treated in a splint by Dr. Combs. Comes in today for a second opinion.     The patient is a/an Retired.    Onset of symptoms/DOI was 3mo ago.    Symptoms are aggravated by activity and movement.    Symptoms are alleviated by rest, immobilization, and medication.    Symptoms consist of pain and swelling.    The patient rates their pain as a 4/10.    Attempted treatment(s) and/or interventions include activity modifications, rest, rest, activity modification, anti-inflammatory medications, physical and/or occupational therapy, and immobilization.     The patient denies any fevers, chills, N/V, D/C and presents for evaluation.       Past Medical History:   Diagnosis Date    ADHD (attention deficit hyperactivity disorder)     Anxiety     Hx of psychiatric care     Hypertension     Psychiatric problem     PTSD (post-traumatic stress disorder)     SVT (supraventricular tachycardia)     Therapy      Past Surgical History:   Procedure Laterality Date    ABLATION N/A 2/18/2022    Procedure: Ablation;  Surgeon: Al Guerra MD;  [facility];  Service: Cardiology;  Laterality: N/A;  SVT, RFA, JERONIMO, anes, MB, 3prep    VASECTOMY       Review of patient's allergies indicates:  No Known Allergies  Social History     Social History Narrative    Not on file  "    No family history on file.      Current Outpatient Medications:     chlorthalidone (HYGROTEN) 25 MG Tab, TAKE 1/2 TABLET(12.5 MG) BY MOUTH EVERY DAY, Disp: 45 tablet, Rfl: 3    dextroamphetamine-amphetamine (ADDERALL XR) 10 MG 24 hr capsule, 1 capsule every afternoon, Disp: 30 capsule, Rfl: 0    [START ON 1/15/2023] dextroamphetamine-amphetamine (ADDERALL XR) 10 MG 24 hr capsule, 1 capsule every afternoon, Disp: 30 capsule, Rfl: 0    [START ON 2/14/2023] dextroamphetamine-amphetamine (ADDERALL XR) 10 MG 24 hr capsule, 1 capsule every afternoon, Disp: 30 capsule, Rfl: 0    dextroamphetamine-amphetamine (ADDERALL XR) 25 MG 24 hr capsule, Take 1 capsule (25 mg total) by mouth every morning., Disp: 30 capsule, Rfl: 0    [START ON 1/15/2023] dextroamphetamine-amphetamine (ADDERALL XR) 25 MG 24 hr capsule, Take 1 capsule (25 mg total) by mouth every morning., Disp: 30 capsule, Rfl: 0    [START ON 2/14/2023] dextroamphetamine-amphetamine (ADDERALL XR) 25 MG 24 hr capsule, Take 1 capsule (25 mg total) by mouth every morning., Disp: 30 capsule, Rfl: 0    telmisartan (MICARDIS) 80 MG Tab, Take 1 tablet (80 mg total) by mouth once daily., Disp: 90 tablet, Rfl: 3  No current facility-administered medications for this visit.    Facility-Administered Medications Ordered in Other Visits:     sodium chloride 0.9% bolus 1,000 mL, 1,000 mL, Intravenous, Once, Li Stephen NP      Review of Systems:  As per HPI otherwise noncontributory    OBJECTIVE:      Vital Signs (Most Recent):  Vitals:    12/19/22 1123   Weight: 83.9 kg (185 lb)   Height: 5' 9" (1.753 m)     Body mass index is 27.32 kg/m².      Physical Exam:  Constitutional: The patient appears well-developed and well-nourished. No distress.   Skin: No lesions appreciated  Head: Normocephalic and atraumatic.   Nose: Nose normal.   Ears: No deformities seen  Eyes: Conjunctivae and EOM are normal.   Neck: No tracheal deviation present.   Cardiovascular: Normal rate and " intact distal pulses.    Pulmonary/Chest: Effort normal. No respiratory distress.   Abdominal: There is no guarding.   Neurological: The patient is alert.   Psychiatric: The patient has a normal mood and affect.     Right Hand/Wrist Examination:    Observation/Inspection:  Swelling  none    Deformity  none  Discoloration  none     Scars   none    Atrophy  none    HAND/WRIST EXAMINATION:  Finkelstein's Test   Neg  WHAT Test    Neg  Snuff box tenderness   Neg  Kwon's Test    Neg  Hook of Hamate Tenderness  Neg  CMC grind    Neg  Circumduction test   Neg    Neurovascular Exam:  Digits WWP, brisk CR < 3s throughout  NVI motor/LTS to M/R/U nerves, radial pulse 2+  Tinel's Test - Carpal Tunnel  Neg  Tinel's Test - Cubital Tunnel  Neg  Phalen's Test    Neg  Median Nerve Compression Test Neg    ROM hand full, slight flexion contracture of the SF DIP    ROM wrist full, painless    ROM elbow full, painless    Abdomen not guarded  Respirations nonlabored  Perfusion intact    Diagnostic Results:     Imaging - I independently viewed the patient's imaging as well as the radiology report.  Xrays of the patient's right hand  demonstrate no evidence of any acute fractures or dislocations or significant degenerative changes.    EMG - none    ASSESSMENT/PLAN:      57 y.o. yo male with 3mo old R SF mallet finger     Plan: The patient and I had a thorough discussion today.  We discussed the working diagnosis as well as several other potential alternative diagnoses.  Treatment options were discussed, both conservative and surgical.  Conservative treatment options would include things such as activity modifications, workplace modifications, a period of rest, oral vs topical OTC and prescription anti-inflammatory medications, occupational therapy, splinting/bracing, immobilization, corticosteroid injections, and others.  Surgical options were discussed as well.     At this time, the patient would like to proceed with a trial of  non-operative management and observation, he can follow up PRN if it continue to bother him in about 3mo.    Should the patient's symptoms worsen, persist, or fail to improve they should return for reevaluation and I would be happy to see them back anytime.    Shayan Sher MD       Attending attestation:  Agree with above.  Mildly persistent pain and swelling at site of prior right small finger soft tissue mallet injury.  Would expect this to improve conservatively.  Discussed therapy and additional splint usage.  Follow-up as needed/if needed.        Please be aware that this note has been generated with the assistance of [employer] voice-to-text.  Please excuse any spelling or grammatical errors.    Thank you for choosing Dr. Anderson Mc for your orthopedic hand and upper extremity care. It is our goal to provide you with exceptional care that will help keep you healthy, active, and get you back in the game.     If you felt that you received exemplary care today, please consider leaving feedback for Dr. Mc on [employer] at [email or web address].    Please do not hesitate to reach out to us via email, phone, or MyChart with any questions, concerns, or feedback.

## 2023-01-11 ENCOUNTER — OFFICE VISIT (OUTPATIENT)
Dept: CARDIOLOGY | Facility: CLINIC | Age: 58
End: 2023-01-11
Payer: OTHER GOVERNMENT

## 2023-01-11 VITALS
BODY MASS INDEX: 28.28 KG/M2 | HEART RATE: 87 BPM | WEIGHT: 190.94 LBS | DIASTOLIC BLOOD PRESSURE: 91 MMHG | SYSTOLIC BLOOD PRESSURE: 145 MMHG | HEIGHT: 69 IN

## 2023-01-11 DIAGNOSIS — F41.1 GENERALIZED ANXIETY DISORDER: ICD-10-CM

## 2023-01-11 DIAGNOSIS — K76.0 FATTY LIVER: ICD-10-CM

## 2023-01-11 DIAGNOSIS — E78.2 MIXED HYPERLIPIDEMIA: ICD-10-CM

## 2023-01-11 DIAGNOSIS — I10 PRIMARY HYPERTENSION: ICD-10-CM

## 2023-01-11 DIAGNOSIS — F90.2 ATTENTION DEFICIT HYPERACTIVITY DISORDER (ADHD), COMBINED TYPE: Primary | ICD-10-CM

## 2023-01-11 DIAGNOSIS — Z13.6 ENCOUNTER FOR SCREENING FOR CARDIOVASCULAR DISORDERS: ICD-10-CM

## 2023-01-11 DIAGNOSIS — Z78.9 STATIN INTOLERANCE: ICD-10-CM

## 2023-01-11 DIAGNOSIS — Z98.890 STATUS POST CATHETER ABLATION OF SLOW PATHWAY: ICD-10-CM

## 2023-01-11 DIAGNOSIS — Z86.79 STATUS POST CATHETER ABLATION OF SLOW PATHWAY: ICD-10-CM

## 2023-01-11 DIAGNOSIS — F43.10 PTSD (POST-TRAUMATIC STRESS DISORDER): ICD-10-CM

## 2023-01-11 DIAGNOSIS — I47.10 SVT (SUPRAVENTRICULAR TACHYCARDIA): ICD-10-CM

## 2023-01-11 PROCEDURE — 99999 PR PBB SHADOW E&M-EST. PATIENT-LVL IV: CPT | Mod: PBBFAC,,, | Performed by: INTERNAL MEDICINE

## 2023-01-11 PROCEDURE — 99214 PR OFFICE/OUTPT VISIT, EST, LEVL IV, 30-39 MIN: ICD-10-PCS | Mod: S$PBB,,, | Performed by: INTERNAL MEDICINE

## 2023-01-11 PROCEDURE — 99214 OFFICE O/P EST MOD 30 MIN: CPT | Mod: PBBFAC | Performed by: INTERNAL MEDICINE

## 2023-01-11 PROCEDURE — 99214 OFFICE O/P EST MOD 30 MIN: CPT | Mod: S$PBB,,, | Performed by: INTERNAL MEDICINE

## 2023-01-11 PROCEDURE — 99999 PR PBB SHADOW E&M-EST. PATIENT-LVL IV: ICD-10-PCS | Mod: PBBFAC,,, | Performed by: INTERNAL MEDICINE

## 2023-01-11 NOTE — PROGRESS NOTES
PCP - Troy Heart MD  Referring Physician:     Subjective:   Patient ID:  Brice Byrd is a 57 y.o. y.o. 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 Garcia who presents to the clinic for follow-up.  Patient is doing well and denies any recent episodes of lightheadedness, dizziness, or palpitations.  He also denies any chest discomfort such as pain, pressure, or tightness at rest or on exertion.  Denies any dyspnea.  He is compliant with his medications.  His enquiring into safety of using Viagra with his hypertensive medications.  He otherwise is doing well and has no other complaints.    History:     Social History     Tobacco Use    Smoking status: Former    Smokeless tobacco: Never   Substance Use Topics    Alcohol use: Never     No family history on file.    Meds:   Review of patient's allergies indicates:  No Known Allergies    Current Outpatient Medications:     chlorthalidone (HYGROTEN) 25 MG Tab, TAKE 1/2 TABLET(12.5 MG) BY MOUTH EVERY DAY, Disp: 45 tablet, Rfl: 3    dextroamphetamine-amphetamine (ADDERALL XR) 10 MG 24 hr capsule, 1 capsule every afternoon, Disp: 30 capsule, Rfl: 0    [START ON 1/15/2023] dextroamphetamine-amphetamine (ADDERALL XR) 10 MG 24 hr capsule, 1 capsule every afternoon, Disp: 30 capsule, Rfl: 0    [START ON 2/14/2023] dextroamphetamine-amphetamine (ADDERALL XR) 10 MG 24 hr capsule, 1 capsule every afternoon, Disp: 30 capsule, Rfl: 0    dextroamphetamine-amphetamine (ADDERALL XR) 25 MG 24 hr capsule, Take 1 capsule (25 mg total) by mouth every morning., Disp: 30 capsule, Rfl: 0    [START ON 1/15/2023] dextroamphetamine-amphetamine (ADDERALL XR) 25 MG 24 hr capsule, Take 1 capsule (25 mg total) by mouth every morning., Disp: 30 capsule, Rfl: 0    [START ON 2/14/2023] dextroamphetamine-amphetamine (ADDERALL XR) 25 MG 24 hr capsule, Take 1 capsule (25 mg total) by mouth every morning., Disp: 30 capsule, Rfl: 0     telmisartan (MICARDIS) 80 MG Tab, Take 1 tablet (80 mg total) by mouth once daily., Disp: 90 tablet, Rfl: 3  No current facility-administered medications for this visit.    Facility-Administered Medications Ordered in Other Visits:     sodium chloride 0.9% bolus 1,000 mL, 1,000 mL, Intravenous, Once, Li Stephen NP    Review of Systems   Constitutional:  Negative for fever.   Eyes:  Negative for blurred vision and double vision.   Respiratory:  Negative for shortness of breath.    Cardiovascular:  Negative for chest pain, palpitations, orthopnea, claudication, leg swelling and PND.   Gastrointestinal:  Negative for abdominal pain, nausea and vomiting.   Neurological:  Negative for loss of consciousness and weakness.     Objective:   There were no vitals taken for this visit.  Physical Exam  Constitutional:       General: He is not in acute distress.     Appearance: He is not diaphoretic.   HENT:      Head: Normocephalic and atraumatic.      Right Ear: External ear normal.      Left Ear: External ear normal.   Neck:      Thyroid: No thyromegaly.      Trachea: No tracheal deviation.   Cardiovascular:      Rate and Rhythm: Normal rate.      Pulses:           Carotid pulses are 2+ on the right side and 2+ on the left side.       Radial pulses are 2+ on the right side and 2+ on the left side.        Femoral pulses are 2+ on the right side and 2+ on the left side.       Popliteal pulses are 2+ on the right side and 2+ on the left side.        Dorsalis pedis pulses are 2+ on the right side and 2+ on the left side.        Posterior tibial pulses are 2+ on the right side and 2+ on the left side.      Heart sounds: Normal heart sounds. No murmur heard.    No friction rub. No gallop.   Pulmonary:      Effort: No respiratory distress.      Breath sounds: No wheezing.   Abdominal:      General: There is no distension.      Tenderness: There is no abdominal tenderness. There is no rebound.   Musculoskeletal:         General:  No tenderness or deformity. Normal range of motion.   Lymphadenopathy:      Cervical: No cervical adenopathy.   Skin:     Findings: No erythema or rash.   Neurological:      Mental Status: He is alert and oriented to person, place, and time.       Labs:     Lab Results   Component Value Date     06/30/2022    K 3.9 06/30/2022     06/30/2022    CO2 31 (H) 06/30/2022    BUN 20 06/30/2022    CREATININE 1.0 06/30/2022    ANIONGAP 8 06/30/2022     Lab Results   Component Value Date    HGBA1C 5.4 11/23/2021     Lab Results   Component Value Date    BNP 14 11/22/2021       Lab Results   Component Value Date    WBC 6.68 06/23/2022    HGB 14.5 06/23/2022    HCT 44.4 06/23/2022     06/23/2022    GRAN 3.8 06/23/2022    GRAN 57.1 06/23/2022     Lab Results   Component Value Date    CHOL 303 (H) 02/15/2022    HDL 63 02/15/2022    LDLCALC 215.4 (H) 02/15/2022    TRIG 123 02/15/2022       Lab Results   Component Value Date     06/30/2022    K 3.9 06/30/2022     06/30/2022    CO2 31 (H) 06/30/2022    BUN 20 06/30/2022    CREATININE 1.0 06/30/2022    ANIONGAP 8 06/30/2022     Lab Results   Component Value Date    HGBA1C 5.4 11/23/2021     Lab Results   Component Value Date    BNP 14 11/22/2021    Lab Results   Component Value Date    WBC 6.68 06/23/2022    HGB 14.5 06/23/2022    HCT 44.4 06/23/2022     06/23/2022    GRAN 3.8 06/23/2022    GRAN 57.1 06/23/2022     Lab Results   Component Value Date    CHOL 303 (H) 02/15/2022    HDL 63 02/15/2022    LDLCALC 215.4 (H) 02/15/2022    TRIG 123 02/15/2022                Cardiovascular Imaging:     Echo 11/23/21:   Summary    The left ventricle is normal in size with normal systolic function. The estimated ejection fraction is 60%.  Normal right ventricular size with normal right ventricular systolic function.  Normal left ventricular diastolic function.  The estimated PA systolic pressure is 21 mmHg.  Normal central venous pressure (3 mmHg).    SVT  Ablation 2/18/22:  Induction of typical AVNRT  SVT ablation (slow pathway modification).  Electrophysiology study with coronary sinus pacing.  3D mapping performed with Ensite.     Assessment & Plan:     1. Attention deficit hyperactivity disorder (ADHD), combined type    2. PTSD (post-traumatic stress disorder)    3. Generalized anxiety disorder    4. Primary hypertension    5. SVT (supraventricular tachycardia)    6. Mixed hyperlipidemia    7. Status post catheter ablation of slow pathway    8. Fatty liver    9. Erectile Dysfunction       Plan:  -will order CT calcium score, if score is above 100 recommend starting PCSK-9  -continue other current medication regimen  -ok to use Viagra  -follow up in 6 months    Signed:  Ric Delaney M.D.  Cardiovascular Fellow  [facility]

## 2023-01-12 ENCOUNTER — HOSPITAL ENCOUNTER (OUTPATIENT)
Dept: RADIOLOGY | Facility: HOSPITAL | Age: 58
Discharge: HOME | End: 2023-01-12
Attending: INTERNAL MEDICINE
Payer: OTHER GOVERNMENT

## 2023-01-12 ENCOUNTER — OFFICE VISIT (OUTPATIENT)
Dept: PSYCHIATRY | Facility: CLINIC | Age: 58
End: 2023-01-12
Payer: OTHER GOVERNMENT

## 2023-01-12 DIAGNOSIS — F43.10 PTSD (POST-TRAUMATIC STRESS DISORDER): Primary | ICD-10-CM

## 2023-01-12 DIAGNOSIS — Z13.6 ENCOUNTER FOR SCREENING FOR CARDIOVASCULAR DISORDERS: ICD-10-CM

## 2023-01-12 PROCEDURE — 90837 PSYTX W PT 60 MINUTES: CPT | Mod: ,,, | Performed by: SOCIAL WORKER

## 2023-01-12 PROCEDURE — 75571 CT HRT W/O DYE W/CA TEST: CPT | Mod: TC

## 2023-01-12 PROCEDURE — 99999 PR PBB SHADOW E&M-EST. PATIENT-LVL I: CPT | Mod: PBBFAC,,, | Performed by: SOCIAL WORKER

## 2023-01-12 PROCEDURE — 99211 OFF/OP EST MAY X REQ PHY/QHP: CPT | Mod: PBBFAC | Performed by: SOCIAL WORKER

## 2023-01-12 PROCEDURE — 90837 PR PSYCHOTHERAPY W/PATIENT, 60 MIN: ICD-10-PCS | Mod: ,,, | Performed by: SOCIAL WORKER

## 2023-01-12 PROCEDURE — 99999 PR PBB SHADOW E&M-EST. PATIENT-LVL I: ICD-10-PCS | Mod: PBBFAC,,, | Performed by: SOCIAL WORKER

## 2023-01-12 PROCEDURE — 75571 CT CALCIUM SCORING CARDIAC: ICD-10-PCS | Mod: 26,,, | Performed by: RADIOLOGY

## 2023-01-12 PROCEDURE — 75571 CT HRT W/O DYE W/CA TEST: CPT | Mod: 26,,, | Performed by: RADIOLOGY
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
## 2023-01-13 ENCOUNTER — TELEPHONE (OUTPATIENT)
Dept: INTERNAL MEDICINE | Facility: CLINIC | Age: 58
End: 2023-01-13
Payer: OTHER GOVERNMENT

## 2023-01-13 DIAGNOSIS — R93.1 AGATSTON CAC SCORE, >400: ICD-10-CM

## 2023-01-13 DIAGNOSIS — N52.9 ERECTILE DYSFUNCTION, UNSPECIFIED ERECTILE DYSFUNCTION TYPE: Primary | ICD-10-CM

## 2023-01-13 RX ORDER — SILDENAFIL 100 MG/1
100 TABLET, FILM COATED ORAL DAILY PRN
Qty: 30 TABLET | Refills: 11 | Status: SHIPPED | OUTPATIENT
Start: 2023-01-13 | End: 2024-01-13

## 2023-01-13 NOTE — TELEPHONE ENCOUNTER
Called pt; pt answered    Relayed 's message to pt    Pt verbalized understandng and intent to use GoodRx

## 2023-01-13 NOTE — TELEPHONE ENCOUNTER
Happy to send in viagra for him.  Glad he is getting the injections to lower cholesterol    Let him know viagra is not covered by insurance and so is self pay.  Therefore, important to send it to a location that is cheapest for him using [employer] coupons.  He can download the [employer] daniel for free and find the coupon there to show the pharmacist. Or go online and print it out    If I send in #30 of the 100mg for 1 daily prn, the cost would be:  $375.45 at his usual [facility] with the [employer] coupon  BUT  Only $5.76 for first Rx at [facility] with a free goodrx registration, & after that about $15 os so per 30 tablets for refills.    Therefore, to save $,  I sent it to:  [facility]  [address] [place] [address]

## 2023-01-13 NOTE — TELEPHONE ENCOUNTER
Pt wanted me to let you know that he seen the cardiologist and was approved for the cholesterol injection       Pt is also requesting a prescription for Viagra, states his cardiologist says it'll be okay for him to be on it.

## 2023-01-13 NOTE — TELEPHONE ENCOUNTER
----- Message from Lucia Quigley sent at 1/12/2023 10:42 AM CST -----  Contact: Self/[phone]  Pt said that he is calling in regards to needing to get a return call from the nurse about a medication. Please advise

## 2023-01-30 ENCOUNTER — TELEPHONE (OUTPATIENT)
Dept: CARDIOLOGY | Facility: CLINIC | Age: 58
End: 2023-01-30
Payer: OTHER GOVERNMENT

## 2023-01-30 NOTE — TELEPHONE ENCOUNTER
----- Message from Ric Delaney MD sent at 1/30/2023  2:09 PM CST -----  Regarding: RE: PRASHANTH Berman  [facility]. I will document this and resend a prescription.    Dr Delaney      ----- Message -----  From: Kristy Bates RN  Sent: 1/26/2023   4:48 PM CST  To: Kristy Bates, RN, #  Subject: PA Repatha                                       PA for Repatha was denied by insurance. Pt only ever treated by simvastatin shortly low dose and no statin allergy.     Please advise,

## 2023-01-31 ENCOUNTER — TELEPHONE (OUTPATIENT)
Dept: CARDIOLOGY | Facility: CLINIC | Age: 58
End: 2023-01-31
Payer: OTHER GOVERNMENT

## 2023-01-31 NOTE — TELEPHONE ENCOUNTER
----- Message from Aiyana Ramos sent at 1/31/2023  9:34 AM CST -----  Regarding: PA  Pt called for     Rx:  evolocumab (REPATHA SURECLICK) 140 mg/mL IrlandaIj    Pharmacy:  [id number] - [place] - [address] AT [facility]   Phone:  [phone]  Fax:  [phone]      LOV:  01/11/2023 Office Visit Ric Delaney MD

## 2023-02-01 NOTE — TELEPHONE ENCOUNTER
Spoke with pt and dr Delaney. Pt has a long history of side effects w. statins. This happened while [facility]. He was in the past on atorvastatin for at least 8-10 months: it made him tired, nauseous, gave him HA and diarrhea. He also tried pitavastatin, simvastatin, and rosuvastatin and had similar issues.

## 2023-02-02 ENCOUNTER — TELEPHONE (OUTPATIENT)
Dept: CARDIOLOGY | Facility: CLINIC | Age: 58
End: 2023-02-02
Payer: OTHER GOVERNMENT

## 2023-02-02 NOTE — TELEPHONE ENCOUNTER
Repeat Request for  Prior Authorization for Repatha, with additional info was approved by insurance:    CaseId:[id number];Status:Approved;Review Type:Prior Auth;Coverage Start Date:01/02/2023;Coverage End Date:02/01/2024;    Patient was updated per Phone call and verbalized understanding.    Note: added info about statins intolerance/ allergy, CAC 900s

## 2023-02-07 ENCOUNTER — OFFICE VISIT (OUTPATIENT)
Dept: PSYCHIATRY | Facility: CLINIC | Age: 58
End: 2023-02-07
Payer: OTHER GOVERNMENT

## 2023-02-07 DIAGNOSIS — F41.1 GENERALIZED ANXIETY DISORDER: ICD-10-CM

## 2023-02-07 DIAGNOSIS — F43.10 PTSD (POST-TRAUMATIC STRESS DISORDER): Primary | ICD-10-CM

## 2023-02-07 PROCEDURE — 99999 PR PBB SHADOW E&M-EST. PATIENT-LVL I: ICD-10-PCS | Mod: PBBFAC,,, | Performed by: SOCIAL WORKER

## 2023-02-07 PROCEDURE — 99999 PR PBB SHADOW E&M-EST. PATIENT-LVL I: CPT | Mod: PBBFAC,,, | Performed by: SOCIAL WORKER

## 2023-02-07 PROCEDURE — 90837 PR PSYCHOTHERAPY W/PATIENT, 60 MIN: ICD-10-PCS | Mod: ,,, | Performed by: SOCIAL WORKER

## 2023-02-07 PROCEDURE — 90837 PSYTX W PT 60 MINUTES: CPT | Mod: ,,, | Performed by: SOCIAL WORKER

## 2023-02-07 PROCEDURE — 99211 OFF/OP EST MAY X REQ PHY/QHP: CPT | Mod: PBBFAC | Performed by: SOCIAL WORKER

## 2023-02-07 NOTE — PROGRESS NOTES
Individual Psychotherapy (PhD/LCSW)    2/7/2023    Site:  [facility]         Therapeutic Intervention: Met with patient.  Outpatient - Insight oriented psychotherapy 60 min - CPT code 33839, Outpatient - Behavior modifying psychotherapy 60 min - CPT code 65338, Outpatient - Supportive psychotherapy 60 min - CPT Code 32427, and Outpatient - Interactive psychotherapy 60 min - CPT code 53117    Chief complaint/reason for encounter: ptsd, anxiety     Interval History of present illness: Pt is a 56 yo male who [facility] Pt initially established psychotherapy in order to address his dx of PTSD, ADHD, anxiety Pt reports mental health history hx of has participated in counseling/psychotherapy on an outpatient basis in the past and currently under psychiatric care. Pt currently prescribed Adderall. Pt is established with NICOLETTE Marks.     Pt presents in office.  Last visit was 4 month ago. Pt has been continuing night school. He has noticed being triggered with his crisis management class. Engaged in active discussion on grounding techniques to help with rumination. Continued exploration and processing of feelings and trauma.Engaged in active discussion an assisted with processing emotion, thought processes, and the identification of triggers. Provided support, feedback, validation and re-framing. Pt fully engaged. Pt remains receptive to psychotherapy. Wishes to return as scheduled.       Treatment plan:  Target symptoms:  PTSD, anxiety  Why chosen therapy is appropriate versus another modality: relevant to diagnosis, patient responds to this modality, evidence based practice  Outcome monitoring methods: self-report, observation  Therapeutic intervention type: insight oriented psychotherapy, behavior modifying psychotherapy, supportive psychotherapy, interactive psychotherapy    Risk parameters:  Patient reports no suicidal ideation  Patient reports no homicidal ideation  Patient  reports no self-injurious behavior  Patient reports no violent behavior    Verbal deficits: None    Patient's response to intervention:  The patient's response to intervention is accepting.    Progress toward goals and other mental status changes:  The patient's progress toward goals is good.    Diagnosis:     ICD-10-CM ICD-9-CM   1. PTSD (post-traumatic stress disorder)  F43.10 309.81   2. Generalized anxiety disorder  F41.1 300.02         Plan:  individual psychotherapy    Return to Clinic: as scheduled    Length of Service (minutes): 60

## 2023-02-07 NOTE — PATIENT INSTRUCTIONS
Welcome to [facility] and thank you for choosing to work with me. It is very important to me that you get the results you want in therapy and that your experience is positive. This letter is to provide you with an overview of some of our policies.     First sessions are generally 1 hr and follow up sessions will last 45 minutes. If you need to cancel an appointment, please give us at least 24 hours' notice so that we can offer your appointment time to another client. To schedule appointments, please contact our schedulers at [phone], or you can book online through the [facility] portal, [email or web address].    If missing more than 3 scheduled sessions without 24 hour notice or providing appropriate excuse ahead of time you will be asked to seek care elsewhere.     The best way to reach me is through the messaging option on the [facility] portal. I strongly encourage you to sign on to the [facility] portal if you have not already done this. I typically answer messages within 24 hours, but it is not to be used for psychiatric emergencies. If you experience a psychiatric emergency, please go to the nearest emergency room or call our office if you need to talk to someone during business hours. If you need to talk to someone after business hours, call our office [phone], and someone will page the resident on-call.   Your sessions are confidential with some exceptions. Some limitations to confidentiality include:  If a patient poses an imminent risk to self or others (ex: intent to commit suicide),  If there is a reasonable concern that a child or dependent/elder adult is being abused or neglected,  If collaboration with other professionals would help you (this involves your signed consent if that professional is not employed by [facility]),  Though access is restricted, psychiatric records and emails are a part of your medical record. I do limit what I disclose in records.    Please let me know if you  have any questions. I look forward to working with you.     Warmly,       Valerie Atkins LCSW  (she/her)

## 2023-03-01 DIAGNOSIS — I10 PRIMARY HYPERTENSION: ICD-10-CM

## 2023-03-03 RX ORDER — TELMISARTAN 80 MG/1
80 TABLET ORAL DAILY
Qty: 90 TABLET | Refills: 3 | Status: SHIPPED | OUTPATIENT
Start: 2023-03-03 | End: 2024-03-02

## 2023-03-13 ENCOUNTER — PATIENT MESSAGE (OUTPATIENT)
Dept: PSYCHIATRY | Facility: CLINIC | Age: 58
End: 2023-03-13
Payer: OTHER GOVERNMENT

## 2023-03-14 ENCOUNTER — OFFICE VISIT (OUTPATIENT)
Dept: INTERNAL MEDICINE | Facility: CLINIC | Age: 58
End: 2023-03-14
Payer: OTHER GOVERNMENT

## 2023-03-14 ENCOUNTER — LAB VISIT (OUTPATIENT)
Dept: LAB | Facility: HOSPITAL | Age: 58
End: 2023-03-14
Attending: FAMILY MEDICINE
Payer: OTHER GOVERNMENT

## 2023-03-14 VITALS
OXYGEN SATURATION: 99 % | SYSTOLIC BLOOD PRESSURE: 130 MMHG | WEIGHT: 183 LBS | HEART RATE: 50 BPM | HEIGHT: 69 IN | BODY MASS INDEX: 27.11 KG/M2 | DIASTOLIC BLOOD PRESSURE: 80 MMHG

## 2023-03-14 DIAGNOSIS — Z23 NEED FOR VACCINATION: ICD-10-CM

## 2023-03-14 DIAGNOSIS — I10 PRIMARY HYPERTENSION: ICD-10-CM

## 2023-03-14 DIAGNOSIS — F90.2 ATTENTION DEFICIT HYPERACTIVITY DISORDER (ADHD), COMBINED TYPE: Primary | ICD-10-CM

## 2023-03-14 PROCEDURE — 80069 RENAL FUNCTION PANEL: CPT | Performed by: FAMILY MEDICINE

## 2023-03-14 PROCEDURE — 36415 COLL VENOUS BLD VENIPUNCTURE: CPT | Performed by: FAMILY MEDICINE

## 2023-03-14 PROCEDURE — 99213 OFFICE O/P EST LOW 20 MIN: CPT | Mod: PBBFAC | Performed by: FAMILY MEDICINE

## 2023-03-14 PROCEDURE — 99999 PR PBB SHADOW E&M-EST. PATIENT-LVL III: ICD-10-PCS | Mod: PBBFAC,,, | Performed by: FAMILY MEDICINE

## 2023-03-14 PROCEDURE — 99214 PR OFFICE/OUTPT VISIT, EST, LEVL IV, 30-39 MIN: ICD-10-PCS | Mod: S$PBB,,, | Performed by: FAMILY MEDICINE

## 2023-03-14 PROCEDURE — 99999 PR PBB SHADOW E&M-EST. PATIENT-LVL III: CPT | Mod: PBBFAC,,, | Performed by: FAMILY MEDICINE

## 2023-03-14 PROCEDURE — 99214 OFFICE O/P EST MOD 30 MIN: CPT | Mod: S$PBB,,, | Performed by: FAMILY MEDICINE

## 2023-03-14 PROCEDURE — 90677 PCV20 VACCINE IM: CPT | Mod: PBBFAC

## 2023-03-14 RX ORDER — CHLORTHALIDONE 25 MG/1
25 TABLET ORAL DAILY
Qty: 90 TABLET | Refills: 3 | Status: SHIPPED | OUTPATIENT
Start: 2023-03-14

## 2023-03-14 RX ORDER — DEXTROAMPHETAMINE SACCHARATE, AMPHETAMINE ASPARTATE MONOHYDRATE, DEXTROAMPHETAMINE SULFATE AND AMPHETAMINE SULFATE 2.5; 2.5; 2.5; 2.5 MG/1; MG/1; MG/1; MG/1
CAPSULE, EXTENDED RELEASE ORAL
Qty: 30 CAPSULE | Refills: 0 | Status: SHIPPED | OUTPATIENT
Start: 2023-05-16 | End: 2023-05-30

## 2023-03-14 RX ORDER — DEXTROAMPHETAMINE SACCHARATE, AMPHETAMINE ASPARTATE MONOHYDRATE, DEXTROAMPHETAMINE SULFATE AND AMPHETAMINE SULFATE 7.5; 7.5; 7.5; 7.5 MG/1; MG/1; MG/1; MG/1
30 CAPSULE, EXTENDED RELEASE ORAL EVERY MORNING
Qty: 30 CAPSULE | Refills: 0 | Status: SHIPPED | OUTPATIENT
Start: 2023-05-16 | End: 2023-05-30

## 2023-03-14 RX ORDER — DEXTROAMPHETAMINE SACCHARATE, AMPHETAMINE ASPARTATE MONOHYDRATE, DEXTROAMPHETAMINE SULFATE AND AMPHETAMINE SULFATE 2.5; 2.5; 2.5; 2.5 MG/1; MG/1; MG/1; MG/1
CAPSULE, EXTENDED RELEASE ORAL
Qty: 30 CAPSULE | Refills: 0 | Status: SHIPPED | OUTPATIENT
Start: 2023-03-17 | End: 2023-05-30

## 2023-03-14 RX ORDER — DEXTROAMPHETAMINE SACCHARATE, AMPHETAMINE ASPARTATE MONOHYDRATE, DEXTROAMPHETAMINE SULFATE AND AMPHETAMINE SULFATE 2.5; 2.5; 2.5; 2.5 MG/1; MG/1; MG/1; MG/1
CAPSULE, EXTENDED RELEASE ORAL
Qty: 30 CAPSULE | Refills: 0 | Status: SHIPPED | OUTPATIENT
Start: 2023-04-16 | End: 2023-05-30

## 2023-03-14 RX ORDER — DEXTROAMPHETAMINE SACCHARATE, AMPHETAMINE ASPARTATE MONOHYDRATE, DEXTROAMPHETAMINE SULFATE AND AMPHETAMINE SULFATE 7.5; 7.5; 7.5; 7.5 MG/1; MG/1; MG/1; MG/1
30 CAPSULE, EXTENDED RELEASE ORAL EVERY MORNING
Qty: 30 CAPSULE | Refills: 0 | Status: SHIPPED | OUTPATIENT
Start: 2023-04-16 | End: 2023-05-30

## 2023-03-14 RX ORDER — DEXTROAMPHETAMINE SACCHARATE, AMPHETAMINE ASPARTATE MONOHYDRATE, DEXTROAMPHETAMINE SULFATE AND AMPHETAMINE SULFATE 7.5; 7.5; 7.5; 7.5 MG/1; MG/1; MG/1; MG/1
30 CAPSULE, EXTENDED RELEASE ORAL EVERY MORNING
Qty: 30 CAPSULE | Refills: 0 | Status: SHIPPED | OUTPATIENT
Start: 2023-03-17 | End: 2023-05-30

## 2023-03-14 NOTE — PROGRESS NOTES
"Subjective:       Patient ID: Brice Byrd is a 57 y.o. male.    Chief Complaint: Follow-up    Patient is here for follow-up of ADHD meds  Elevated LFTs followed by hepatology.  Stain intolerant, saw cardiology, on repatha, AIB=013    Retired from    In his 40s had extensive testing and Dx ADHD and on adderall 20 AM, 10 afternoon with excellent results on thinking and QoL, has about 30d left on his terminal Rx form [employer]. So will give him 30d Rx to fill on 7/15 and another for 8/15. Then will see him on 9/15 and q 3 mo thereafter.  There is a 2/24/22 letter from [facility] saying:   "Mr. Brice Byrd is a patient of [facility] and his physician Dr. Al Guerra is aware that he takes Adderall 20 mg as needed and notes that this medication will not affect his SVT heart condition."   empty as he got this form [employer].  Dose has been 25mg AM and 10mg afternoon; he would like a better reslts in morning, is going back to school, will increase to 30mg AM and keep 10mg PM        Review of patient's allergies indicates:   Allergen Reactions    Statins-hmg-coa reductase inhibitors Other (See Comments)     nausea, HA, diarrhea, tiredness       Social History     Tobacco Use    Smoking status: Former    Smokeless tobacco: Never   Substance Use Topics    Alcohol use: Never    Drug use: Never       No family history on file.    Past Surgical History:   Procedure Laterality Date    ABLATION N/A 2/18/2022    Procedure: Ablation;  Surgeon: Al Guerra MD;  Location: [facility];  Service: Cardiology;  Laterality: N/A;  SVT, RFA, JERONIMO, anes, MB, 3prep    VASECTOMY         Patient Active Problem List   Diagnosis    Transaminitis    Syncope and collapse    Primary hypertension    SVT (supraventricular tachycardia)    Mixed hyperlipidemia    Status post catheter ablation of slow pathway    Attention deficit hyperactivity disorder (ADHD), combined type    Open angle with borderline findings, low risk, " bilateral    Fatty liver    Mallet finger of right hand    PTSD (post-traumatic stress disorder)    Generalized anxiety disorder    Agatston CAC score, >400       Current Outpatient Medications on File Prior to Visit   Medication Sig Dispense Refill    sildenafiL (VIAGRA) 100 MG tablet Take 1 tablet (100 mg total) by mouth daily as needed for Erectile Dysfunction. 30 tablet 11    telmisartan (MICARDIS) 80 MG Tab Take 1 tablet (80 mg total) by mouth once daily. 90 tablet 3    [DISCONTINUED] chlorthalidone (HYGROTEN) 25 MG Tab TAKE 1/2 TABLET(12.5 MG) BY MOUTH EVERY DAY 45 tablet 3    [DISCONTINUED] dextroamphetamine-amphetamine (ADDERALL XR) 10 MG 24 hr capsule 1 capsule every afternoon 30 capsule 0    [DISCONTINUED] dextroamphetamine-amphetamine (ADDERALL XR) 10 MG 24 hr capsule 1 capsule every afternoon 30 capsule 0    [DISCONTINUED] dextroamphetamine-amphetamine (ADDERALL XR) 10 MG 24 hr capsule 1 capsule every afternoon 30 capsule 0    [DISCONTINUED] dextroamphetamine-amphetamine (ADDERALL XR) 25 MG 24 hr capsule Take 1 capsule (25 mg total) by mouth every morning. 30 capsule 0    [DISCONTINUED] dextroamphetamine-amphetamine (ADDERALL XR) 25 MG 24 hr capsule Take 1 capsule (25 mg total) by mouth every morning. 30 capsule 0    [DISCONTINUED] dextroamphetamine-amphetamine (ADDERALL XR) 25 MG 24 hr capsule Take 1 capsule (25 mg total) by mouth every morning. 30 capsule 0     Current Facility-Administered Medications on File Prior to Visit   Medication Dose Route Frequency Provider Last Rate Last Admin    sodium chloride 0.9% bolus 1,000 mL  1,000 mL Intravenous Once Li Stephen NP               Review of Systems   Constitutional:  Negative for chills and fever.   HENT:  Negative for ear pain.    Eyes:  Negative for pain.   Respiratory:  Negative for chest tightness.    Cardiovascular:  Negative for chest pain.   Gastrointestinal:  Negative for abdominal pain.   Genitourinary:  Negative for flank pain.    Musculoskeletal:  Negative for gait problem.   Neurological:  Negative for syncope.   Psychiatric/Behavioral:  Negative for behavioral problems.      Objective:      Physical Exam  Constitutional:       Appearance: He is well-developed.   HENT:      Head: Normocephalic and atraumatic.   Cardiovascular:      Rate and Rhythm: Normal rate.      Heart sounds: Normal heart sounds.   Pulmonary:      Effort: No respiratory distress.      Breath sounds: Normal breath sounds. No wheezing or rales.   Abdominal:      Palpations: Abdomen is soft.   Musculoskeletal:      Cervical back: Neck supple.   Skin:     General: Skin is dry.   Neurological:      Mental Status: He is alert.   Psychiatric:         Behavior: Behavior normal.       Assessment:       1. Attention deficit hyperactivity disorder (ADHD), combined type    2. Need for vaccination    3. Primary hypertension          Plan:       Brice was seen today for follow-up.    Diagnoses and all orders for this visit:    Attention deficit hyperactivity disorder (ADHD), combined type  -     dextroamphetamine-amphetamine (ADDERALL XR) 10 MG 24 hr capsule; 1 capsule every afternoon  -     dextroamphetamine-amphetamine (ADDERALL XR) 10 MG 24 hr capsule; 1 capsule every afternoon  -     dextroamphetamine-amphetamine (ADDERALL XR) 10 MG 24 hr capsule; 1 capsule every afternoon #30, no RF  One Rx for 1 month supply was printed for filling today. A second was printed for filling in 1 month. A third was printed for filling in 2 months. Each Rx was dated today. F/u in 3 months. No refills are possible by telephone, must be seen in person.   -     dextroamphetamine-amphetamine (ADDERALL XR) 30 MG 24 hr capsule; Take 1 capsule (30 mg total) by mouth every morning.  -     dextroamphetamine-amphetamine (ADDERALL XR) 30 MG 24 hr capsule; Take 1 capsule (30 mg total) by mouth every morning.  -     dextroamphetamine-amphetamine (ADDERALL XR) 30 MG 24 hr capsule; Take 1 capsule (30 mg total)  by mouth every morning. #30, no RF  One Rx for 1 month supply was printed for filling today. A second was printed for filling in 1 month. A third was printed for filling in 2 months. Each Rx was dated today. F/u in 3 months. No refills are possible by telephone, must be seen in person.     Need for vaccination  -     (In Office Administered) Pneumococcal Conjugate Vaccine (20 Valent) (IM) - administered 2/14/23    Primary hypertension  -controlled, stable, no change in meds   -     Renal Function Panel; Future  -     chlorthalidone (HYGROTEN) 25 MG Tab; Take 1 tablet (25 mg total) by mouth once daily. (He has increased form half a tablet to full tablet to get good BP control

## 2023-03-15 LAB
ALBUMIN SERPL BCP-MCNC: 4 G/DL (ref 3.5–5.2)
ANION GAP SERPL CALC-SCNC: 7 MMOL/L (ref 8–16)
BUN SERPL-MCNC: 13 MG/DL (ref 6–20)
CALCIUM SERPL-MCNC: 9.7 MG/DL (ref 8.7–10.5)
CHLORIDE SERPL-SCNC: 100 MMOL/L (ref 95–110)
CO2 SERPL-SCNC: 31 MMOL/L (ref 23–29)
CREAT SERPL-MCNC: 1.2 MG/DL (ref 0.5–1.4)
EST. GFR  (NO RACE VARIABLE): >60 ML/MIN/1.73 M^2
GLUCOSE SERPL-MCNC: 94 MG/DL (ref 70–110)
PHOSPHATE SERPL-MCNC: 2.9 MG/DL (ref 2.7–4.5)
POTASSIUM SERPL-SCNC: 4 MMOL/L (ref 3.5–5.1)
SODIUM SERPL-SCNC: 138 MMOL/L (ref 136–145)

## 2023-04-18 ENCOUNTER — OFFICE VISIT (OUTPATIENT)
Dept: PSYCHIATRY | Facility: CLINIC | Age: 58
End: 2023-04-18
Payer: OTHER GOVERNMENT

## 2023-04-18 DIAGNOSIS — F41.1 GENERALIZED ANXIETY DISORDER: Primary | ICD-10-CM

## 2023-04-18 DIAGNOSIS — R45.4 IRRITABILITY AND ANGER: ICD-10-CM

## 2023-04-18 PROCEDURE — 90837 PR PSYCHOTHERAPY W/PATIENT, 60 MIN: ICD-10-PCS | Mod: ,,, | Performed by: SOCIAL WORKER

## 2023-04-18 PROCEDURE — 90837 PSYTX W PT 60 MINUTES: CPT | Mod: ,,, | Performed by: SOCIAL WORKER

## 2023-04-18 NOTE — PROGRESS NOTES
Individual Psychotherapy (PhD/KURTISW)    4/18/2023    Site:  [facility] [address]         Therapeutic Intervention: Met with patient.  Outpatient - Insight oriented psychotherapy 60 min - CPT code 16363, Outpatient - Behavior modifying psychotherapy 60 min - CPT code 48574, Outpatient - Supportive psychotherapy 60 min - CPT Code 79667, and Outpatient - Interactive psychotherapy 60 min - CPT code 41293    Chief complaint/reason for encounter: ptsd, anxiety     Interval History of present illness: Pt is a 56 yo male who presents today for f/u visit with LCSW. Pt initially established psychotherapy in order to address his dx of PTSD, ADHD, anxiety Pt reports mental health history hx of has participated in counseling/psychotherapy on an outpatient basis in the past and currently under psychiatric care. Pt currently prescribed Adderall. Pt is established with NICOLETTE Marks.     Pt presents in office.  Last visit was 2 months ago. Pt has  been staying at [facility] for the past several weeks due to his daughter being on bed rest and then has delivering her baby at 34 weeks. He visits daughter at home and NICU daily. Pt discussed having some anger issues towards his  in law and the lack of care he feels his son of law is providing. Engaged in self reflection and provided active discussion and constructive processing through reframing, support and feedback. Reviewed ways to challenge anxiety and anger/irritability.  Reviewed reaction techniques. Pt fully engaged. Pt remains receptive. Pt to return as scheduled.         Treatment plan:  Target symptoms:  PTSD, anxiety  Why chosen therapy is appropriate versus another modality: relevant to diagnosis, patient responds to this modality, evidence based practice  Outcome monitoring methods: self-report, observation  Therapeutic intervention type: insight oriented psychotherapy, behavior modifying psychotherapy, supportive psychotherapy, interactive psychotherapy    Risk  parameters:  Patient reports no suicidal ideation  Patient reports no homicidal ideation  Patient reports no self-injurious behavior  Patient reports no violent behavior    Verbal deficits: None    Patient's response to intervention:  The patient's response to intervention is accepting.    Progress toward goals and other mental status changes:  The patient's progress toward goals is good.    Diagnosis:     ICD-10-CM ICD-9-CM   1. Generalized anxiety disorder  F41.1 300.02   2. Irritability and anger  R45.4 799.22         Plan:  individual psychotherapy    Return to Clinic: as scheduled    Length of Service (minutes): 60

## 2023-04-21 ENCOUNTER — PATIENT MESSAGE (OUTPATIENT)
Dept: ADMINISTRATIVE | Facility: HOSPITAL | Age: 58
End: 2023-04-21
Payer: OTHER GOVERNMENT

## 2023-05-15 ENCOUNTER — HOSPITAL ENCOUNTER (OUTPATIENT)
Dept: RADIOLOGY | Facility: HOSPITAL | Age: 58
Discharge: HOME | End: 2023-05-15
Attending: ORTHOPAEDIC SURGERY
Payer: OTHER GOVERNMENT

## 2023-05-15 ENCOUNTER — OFFICE VISIT (OUTPATIENT)
Dept: ORTHOPEDICS | Facility: CLINIC | Age: 58
End: 2023-05-15
Payer: OTHER GOVERNMENT

## 2023-05-15 VITALS — HEIGHT: 69 IN | BODY MASS INDEX: 27.43 KG/M2 | WEIGHT: 185.19 LBS

## 2023-05-15 DIAGNOSIS — M51.36 DDD (DEGENERATIVE DISC DISEASE), LUMBAR: ICD-10-CM

## 2023-05-15 DIAGNOSIS — M54.16 LUMBAR RADICULOPATHY, CHRONIC: Primary | ICD-10-CM

## 2023-05-15 PROCEDURE — 20552 NJX 1/MLT TRIGGER POINT 1/2: CPT | Mod: S$PBB,,, | Performed by: ORTHOPAEDIC SURGERY

## 2023-05-15 PROCEDURE — 99999 PR PBB SHADOW E&M-EST. PATIENT-LVL III: ICD-10-PCS | Mod: PBBFAC,,, | Performed by: ORTHOPAEDIC SURGERY

## 2023-05-15 PROCEDURE — 99214 PR OFFICE/OUTPT VISIT, EST, LEVL IV, 30-39 MIN: ICD-10-PCS | Mod: S$PBB,25,, | Performed by: ORTHOPAEDIC SURGERY

## 2023-05-15 PROCEDURE — 20552 PR INJECT TRIGGER POINT, 1 OR 2: ICD-10-PCS | Mod: S$PBB,,, | Performed by: ORTHOPAEDIC SURGERY

## 2023-05-15 PROCEDURE — 72110 X-RAY EXAM L-2 SPINE 4/>VWS: CPT | Mod: 26,,, | Performed by: INTERNAL MEDICINE

## 2023-05-15 PROCEDURE — 20552 NJX 1/MLT TRIGGER POINT 1/2: CPT | Mod: PBBFAC | Performed by: ORTHOPAEDIC SURGERY

## 2023-05-15 PROCEDURE — 99214 OFFICE O/P EST MOD 30 MIN: CPT | Mod: S$PBB,25,, | Performed by: ORTHOPAEDIC SURGERY

## 2023-05-15 PROCEDURE — 72110 XR LUMBAR SPINE AP AND LAT WITH FLEX/EXT: ICD-10-PCS | Mod: 26,,, | Performed by: INTERNAL MEDICINE

## 2023-05-15 PROCEDURE — 72110 X-RAY EXAM L-2 SPINE 4/>VWS: CPT | Mod: TC

## 2023-05-15 PROCEDURE — 99213 OFFICE O/P EST LOW 20 MIN: CPT | Mod: PBBFAC | Performed by: ORTHOPAEDIC SURGERY

## 2023-05-15 PROCEDURE — 99999 PR PBB SHADOW E&M-EST. PATIENT-LVL III: CPT | Mod: PBBFAC,,, | Performed by: ORTHOPAEDIC SURGERY

## 2023-05-15 RX ORDER — DEXAMETHASONE SODIUM PHOSPHATE 4 MG/ML
8 INJECTION, SOLUTION INTRA-ARTICULAR; INTRALESIONAL; INTRAMUSCULAR; INTRAVENOUS; SOFT TISSUE ONCE
Status: COMPLETED | OUTPATIENT
Start: 2023-05-15 | End: 2023-05-15

## 2023-05-15 RX ORDER — METHOCARBAMOL 750 MG/1
750 TABLET, FILM COATED ORAL NIGHTLY PRN
Qty: 30 TABLET | Refills: 0 | Status: SHIPPED | OUTPATIENT
Start: 2023-05-15 | End: 2023-06-14

## 2023-05-15 RX ORDER — METHYLPREDNISOLONE 4 MG/1
TABLET ORAL
Qty: 1 EACH | Refills: 0 | Status: SHIPPED | OUTPATIENT
Start: 2023-05-15 | End: 2023-06-05 | Stop reason: CLARIF

## 2023-05-15 RX ORDER — GABAPENTIN 600 MG/1
600 TABLET ORAL 3 TIMES DAILY
Qty: 90 TABLET | Refills: 2 | Status: SHIPPED | OUTPATIENT
Start: 2023-05-15 | End: 2023-05-31

## 2023-05-15 RX ADMIN — DEXAMETHASONE SODIUM PHOSPHATE 8 MG: 4 INJECTION, SOLUTION INTRAMUSCULAR; INTRAVENOUS at 10:05

## 2023-05-15 NOTE — PROGRESS NOTES
DATE: 5/15/2023  PATIENT: Brice Byrd    Supervising Physician: Marko Campbell M.D.    CHIEF COMPLAINT: low back and left leg pain    HISTORY:  Brice Byrd is a 57 y.o. male with a pmhx of SVT here for initial evaluation of low back and left leg pain (Back - 10, Leg - 10).  The pain in the back of the left leg is what bothers him most.  The back pain has been present for years but the leg pain started a few days ago with injury. The patient describes the pain as burning down the back of his left leg to his big toe.  The pain is worse with walking and improved by nothing. There is positive associated numbness and tingling. There is positive subjective weakness. Prior treatments have included no previous PT, ESIs, surgery.    The patient denies myelopathic symptoms such as handwriting changes or difficulty with buttons/coins/keys. Denies perineal paresthesias, bowel/bladder dysfunction.    PAST MEDICAL/SURGICAL HISTORY:  Past Medical History:   Diagnosis Date    ADHD (attention deficit hyperactivity disorder)     Anxiety     Hx of psychiatric care     Hypertension     Psychiatric problem     PTSD (post-traumatic stress disorder)     SVT (supraventricular tachycardia)     Therapy      Past Surgical History:   Procedure Laterality Date    ABLATION N/A 2/18/2022    Procedure: Ablation;  Surgeon: Al Guerra MD;  Location: [facility] EP LAB;  Service: Cardiology;  Laterality: N/A;  SVT, RFA, JERONIMO, anes, MB, 3prep    VASECTOMY         Medications:   Current Outpatient Medications on File Prior to Visit   Medication Sig Dispense Refill    chlorthalidone (HYGROTEN) 25 MG Tab Take 1 tablet (25 mg total) by mouth once daily. 90 tablet 3    dextroamphetamine-amphetamine (ADDERALL XR) 10 MG 24 hr capsule 1 capsule every afternoon 30 capsule 0    dextroamphetamine-amphetamine (ADDERALL XR) 10 MG 24 hr capsule 1 capsule every afternoon 30 capsule 0    [START ON 5/16/2023] dextroamphetamine-amphetamine (ADDERALL XR) 10 MG  24 hr capsule 1 capsule every afternoon 30 capsule 0    dextroamphetamine-amphetamine (ADDERALL XR) 30 MG 24 hr capsule Take 1 capsule (30 mg total) by mouth every morning. 30 capsule 0    dextroamphetamine-amphetamine (ADDERALL XR) 30 MG 24 hr capsule Take 1 capsule (30 mg total) by mouth every morning. 30 capsule 0    [START ON 5/16/2023] dextroamphetamine-amphetamine (ADDERALL XR) 30 MG 24 hr capsule Take 1 capsule (30 mg total) by mouth every morning. 30 capsule 0    evolocumab (REPATHA SURECLICK) 140 mg/mL PnIj Inject 1 mL (140 mg total) into the skin every 14 (fourteen) days. 2 mL 3    sildenafiL (VIAGRA) 100 MG tablet Take 1 tablet (100 mg total) by mouth daily as needed for Erectile Dysfunction. 30 tablet 11    telmisartan (MICARDIS) 80 MG Tab Take 1 tablet (80 mg total) by mouth once daily. 90 tablet 3     Current Facility-Administered Medications on File Prior to Visit   Medication Dose Route Frequency Provider Last Rate Last Admin    sodium chloride 0.9% bolus 1,000 mL  1,000 mL Intravenous Once Li Stephen NP           Social History:   Social History     Socioeconomic History    Marital status:     Number of children: 3   Tobacco Use    Smoking status: Former    Smokeless tobacco: Never   Substance and Sexual Activity    Alcohol use: Never    Drug use: Never    Sexual activity: Yes     Partners: Female       REVIEW OF SYSTEMS:  Constitution: Negative. Negative for chills, fever and night sweats.   Cardiovascular: Negative for chest pain and syncope.   Respiratory: Negative for cough and shortness of breath.   Gastrointestinal: See HPI. Negative for nausea/vomiting. Negative for abdominal pain.  Genitourinary: See HPI. Negative for discoloration or dysuria.  Skin: Negative for dry skin, itching and rash.   Hematologic/Lymphatic: Negative for bleeding problem. Does not bruise/bleed easily.   Musculoskeletal: Negative for falls and muscle weakness.   Neurological: See HPI. No seizures.    Endocrine: Negative for polydipsia, polyphagia and polyuria.   Allergic/Immunologic: Negative for hives and persistent infections.     EXAM:  There were no vitals taken for this visit.    General: The patient is a very pleasant 57 y.o. male in no apparent distress, the patient is oriented to person, place and time.  Psych: Normal mood and affect  HEENT: Vision grossly intact, hearing intact to the spoken word.  Lungs: Respirations unlabored.  Gait: Antalgic station and gait, no difficulty with toe or heel walk.   Skin: Dorsal lumbar skin negative for rashes, lesions, hairy patches and surgical scars. There is mild lumbar tenderness to palpation.  Range of motion: Lumbar range of motion is acceptable.  Spinal Balance: Global saggital and coronal spinal balance acceptable, not significant for scoliosis and kyphosis.  Musculoskeletal: No pain with the range of motion of the bilateral hips. No trochanteric tenderness to palpation.  Vascular: Bilateral lower extremities warm and well perfused, dorsalis pedis pulses 2+ bilaterally.  Neurological: Normal strength and tone in all major motor groups in the bilateral lower extremities EXCEPT 4/5 dorsi and plantar flexion in left ankle. ALTERED sensation to light touch in the L2-S1 dermatomes in LLE compared to RLE.  Deep tendon reflexes symmetric 1+ in the bilateral lower extremities.  Negative Babinski bilaterally. Straight leg raise positive on left.    IMAGING:      Today I personally reviewed AP, Lat and Flex/Ex  upright L-spine films that demonstrate severe disc space narrowing without instability.     PROCEDURE:  I have explained the risks, benefits, and alternatives of the procedure in detail.  The patient voices understanding and all questions have been answered.  The patient agrees to proceed as planned. So after I performed a sterile prep of the skin in the normal fashion the left upper outer gluteus and injected 8 mg of decadron with a 25 g needle.        There  is no height or weight on file to calculate BMI.    Hemoglobin A1C   Date Value Ref Range Status   11/23/2021 5.4 4.0 - 5.6 % Final     Comment:     ADA Screening Guidelines:  5.7-6.4%  Consistent with prediabetes  >or=6.5%  Consistent with diabetes    High levels of fetal hemoglobin interfere with the HbA1C  assay. Heterozygous hemoglobin variants (HbS, HgC, etc)do  not significantly interfere with this assay.   However, presence of multiple variants may affect accuracy.             ASSESSMENT/PLAN:    There are no diagnoses linked to this encounter.    Today we discussed at length all of the different treatment options including anti-inflammatories, acetaminophen, rest, ice, heat, physical therapy including strengthening and stretching exercises, home exercises, ROM, aerobic conditioning, aqua therapy, other modalities including ultrasound, massage, and dry needling, epidural steroid injections and finally surgical intervention.      Pt presents with chronic low back pain with acute left lumbar radiculopathy with neuro deficits. Will obtain STAT MRI to further evaluate and call with results. Madrigal end medrol dose pack, gabapentin, and robaxin to pharmacy. Administered IM steroid injection in clinic today.

## 2023-05-15 NOTE — H&P (VIEW-ONLY)
DATE: 5/15/2023  PATIENT: Brice Byrd    Supervising Physician: Marko Campbell M.D.    CHIEF COMPLAINT: low back and left leg pain    HISTORY:  Brice Byrd is a 57 y.o. male with a pmhx of SVT here for initial evaluation of low back and left leg pain (Back - 10, Leg - 10).  The pain in the back of the left leg is what bothers him most.  The back pain has been present for years but the leg pain started a few days ago with injury. The patient describes the pain as burning down the back of his left leg to his big toe.  The pain is worse with walking and improved by nothing. There is positive associated numbness and tingling. There is positive subjective weakness. Prior treatments have included no previous PT, ESIs, surgery.    The patient denies myelopathic symptoms such as handwriting changes or difficulty with buttons/coins/keys. Denies perineal paresthesias, bowel/bladder dysfunction.    PAST MEDICAL/SURGICAL HISTORY:  Past Medical History:   Diagnosis Date    ADHD (attention deficit hyperactivity disorder)     Anxiety     Hx of psychiatric care     Hypertension     Psychiatric problem     PTSD (post-traumatic stress disorder)     SVT (supraventricular tachycardia)     Therapy      Past Surgical History:   Procedure Laterality Date    ABLATION N/A 2/18/2022    Procedure: Ablation;  Surgeon: Al Guerra MD;  Location: [facility] EP LAB;  Service: Cardiology;  Laterality: N/A;  SVT, RFA, JERONIMO, anes, MB, 3prep    VASECTOMY         Medications:   Current Outpatient Medications on File Prior to Visit   Medication Sig Dispense Refill    chlorthalidone (HYGROTEN) 25 MG Tab Take 1 tablet (25 mg total) by mouth once daily. 90 tablet 3    dextroamphetamine-amphetamine (ADDERALL XR) 10 MG 24 hr capsule 1 capsule every afternoon 30 capsule 0    dextroamphetamine-amphetamine (ADDERALL XR) 10 MG 24 hr capsule 1 capsule every afternoon 30 capsule 0    [START ON 5/16/2023] dextroamphetamine-amphetamine (ADDERALL XR) 10 MG  24 hr capsule 1 capsule every afternoon 30 capsule 0    dextroamphetamine-amphetamine (ADDERALL XR) 30 MG 24 hr capsule Take 1 capsule (30 mg total) by mouth every morning. 30 capsule 0    dextroamphetamine-amphetamine (ADDERALL XR) 30 MG 24 hr capsule Take 1 capsule (30 mg total) by mouth every morning. 30 capsule 0    [START ON 5/16/2023] dextroamphetamine-amphetamine (ADDERALL XR) 30 MG 24 hr capsule Take 1 capsule (30 mg total) by mouth every morning. 30 capsule 0    evolocumab (REPATHA SURECLICK) 140 mg/mL PnIj Inject 1 mL (140 mg total) into the skin every 14 (fourteen) days. 2 mL 3    sildenafiL (VIAGRA) 100 MG tablet Take 1 tablet (100 mg total) by mouth daily as needed for Erectile Dysfunction. 30 tablet 11    telmisartan (MICARDIS) 80 MG Tab Take 1 tablet (80 mg total) by mouth once daily. 90 tablet 3     Current Facility-Administered Medications on File Prior to Visit   Medication Dose Route Frequency Provider Last Rate Last Admin    sodium chloride 0.9% bolus 1,000 mL  1,000 mL Intravenous Once Li Stephen NP           Social History:   Social History     Socioeconomic History    Marital status:     Number of children: 3   Tobacco Use    Smoking status: Former    Smokeless tobacco: Never   Substance and Sexual Activity    Alcohol use: Never    Drug use: Never    Sexual activity: Yes     Partners: Female       REVIEW OF SYSTEMS:  Constitution: Negative. Negative for chills, fever and night sweats.   Cardiovascular: Negative for chest pain and syncope.   Respiratory: Negative for cough and shortness of breath.   Gastrointestinal: See HPI. Negative for nausea/vomiting. Negative for abdominal pain.  Genitourinary: See HPI. Negative for discoloration or dysuria.  Skin: Negative for dry skin, itching and rash.   Hematologic/Lymphatic: Negative for bleeding problem. Does not bruise/bleed easily.   Musculoskeletal: Negative for falls and muscle weakness.   Neurological: See HPI. No seizures.    Endocrine: Negative for polydipsia, polyphagia and polyuria.   Allergic/Immunologic: Negative for hives and persistent infections.     EXAM:  There were no vitals taken for this visit.    General: The patient is a very pleasant 57 y.o. male in no apparent distress, the patient is oriented to person, place and time.  Psych: Normal mood and affect  HEENT: Vision grossly intact, hearing intact to the spoken word.  Lungs: Respirations unlabored.  Gait: Antalgic station and gait, no difficulty with toe or heel walk.   Skin: Dorsal lumbar skin negative for rashes, lesions, hairy patches and surgical scars. There is mild lumbar tenderness to palpation.  Range of motion: Lumbar range of motion is acceptable.  Spinal Balance: Global saggital and coronal spinal balance acceptable, not significant for scoliosis and kyphosis.  Musculoskeletal: No pain with the range of motion of the bilateral hips. No trochanteric tenderness to palpation.  Vascular: Bilateral lower extremities warm and well perfused, dorsalis pedis pulses 2+ bilaterally.  Neurological: Normal strength and tone in all major motor groups in the bilateral lower extremities EXCEPT 4/5 dorsi and plantar flexion in left ankle. ALTERED sensation to light touch in the L2-S1 dermatomes in LLE compared to RLE.  Deep tendon reflexes symmetric 1+ in the bilateral lower extremities.  Negative Babinski bilaterally. Straight leg raise positive on left.    IMAGING:      Today I personally reviewed AP, Lat and Flex/Ex  upright L-spine films that demonstrate severe disc space narrowing without instability.     PROCEDURE:  I have explained the risks, benefits, and alternatives of the procedure in detail.  The patient voices understanding and all questions have been answered.  The patient agrees to proceed as planned. So after I performed a sterile prep of the skin in the normal fashion the left upper outer gluteus and injected 8 mg of decadron with a 25 g needle.        There  is no height or weight on file to calculate BMI.    Hemoglobin A1C   Date Value Ref Range Status   11/23/2021 5.4 4.0 - 5.6 % Final     Comment:     ADA Screening Guidelines:  5.7-6.4%  Consistent with prediabetes  >or=6.5%  Consistent with diabetes    High levels of fetal hemoglobin interfere with the HbA1C  assay. Heterozygous hemoglobin variants (HbS, HgC, etc)do  not significantly interfere with this assay.   However, presence of multiple variants may affect accuracy.             ASSESSMENT/PLAN:    There are no diagnoses linked to this encounter.    Today we discussed at length all of the different treatment options including anti-inflammatories, acetaminophen, rest, ice, heat, physical therapy including strengthening and stretching exercises, home exercises, ROM, aerobic conditioning, aqua therapy, other modalities including ultrasound, massage, and dry needling, epidural steroid injections and finally surgical intervention.      Pt presents with chronic low back pain with acute left lumbar radiculopathy with neuro deficits. Will obtain STAT MRI to further evaluate and call with results. Madrigal end medrol dose pack, gabapentin, and robaxin to pharmacy. Administered IM steroid injection in clinic today.

## 2023-05-16 ENCOUNTER — OFFICE VISIT (OUTPATIENT)
Dept: PSYCHIATRY | Facility: CLINIC | Age: 58
End: 2023-05-16
Payer: OTHER GOVERNMENT

## 2023-05-16 DIAGNOSIS — R45.4 IRRITABILITY AND ANGER: ICD-10-CM

## 2023-05-16 DIAGNOSIS — F41.1 GENERALIZED ANXIETY DISORDER: Primary | ICD-10-CM

## 2023-05-16 PROCEDURE — 90834 PSYTX W PT 45 MINUTES: CPT | Mod: ,,, | Performed by: SOCIAL WORKER

## 2023-05-16 PROCEDURE — 90834 PR PSYCHOTHERAPY W/PATIENT, 45 MIN: ICD-10-PCS | Mod: ,,, | Performed by: SOCIAL WORKER

## 2023-05-16 NOTE — PROGRESS NOTES
Individual Psychotherapy (PhD/LCSW)    5/16/2023    Site:  [facility]         Therapeutic Intervention: Met with patient.   Outpatient - Insight oriented psychotherapy 45 min - CPT code 17628, Outpatient - Behavior modifying psychotherapy 45 min - CPT code 78739, Outpatient - Supportive psychotherapy 45 min - CPT Code 55264, and Outpatient - Interactive psychotherapy 45 min - CPT code 01436    Chief complaint/reason for encounter: ptsd, anxiety     Interval History of present illness: Pt is a 58 yo male who presents today for f/u visit with LCSW. Pt initially established psychotherapy in order to address his dx of PTSD, ADHD, anxiety Pt reports mental health history hx of has participated in counseling/psychotherapy on an outpatient basis in the past and currently under psychiatric care. Pt currently prescribed Adderall. Pt is established with NICOLETTE Marks.     Pt presents in office 15 minutes late due to reported traffic. Pt called provider ahead of time.  Last visit was 1 month ago. Pt injured his back which has caused sciatica. His granddaughter was able to be discharged from the NICU and pt has been helping. Pt has discussed his stress and how he has been working on feelings towards his son in law. He also also been trying to enable daughter and son in law less.   Engaged in self reflection and provided active discussion and constructive processing through reframing, support and feedback. Reviewed ways to challenge anxiety and anger/irritability.  Reviewed reaction techniques. Pt fully engaged. Pt remains receptive. Pt to return as scheduled.         Treatment plan:  Target symptoms:  PTSD, anxiety  Why chosen therapy is appropriate versus another modality: relevant to diagnosis, patient responds to this modality, evidence based practice  Outcome monitoring methods: self-report, observation  Therapeutic intervention type: insight oriented psychotherapy, behavior modifying psychotherapy, supportive  psychotherapy, interactive psychotherapy    Risk parameters:  Patient reports no suicidal ideation  Patient reports no homicidal ideation  Patient reports no self-injurious behavior  Patient reports no violent behavior    Verbal deficits: None    Patient's response to intervention:  The patient's response to intervention is accepting.    Progress toward goals and other mental status changes:  The patient's progress toward goals is good.    Diagnosis:     ICD-10-CM ICD-9-CM   1. Generalized anxiety disorder  F41.1 300.02   2. Irritability and anger  R45.4 799.22         Plan:  individual psychotherapy    Return to Clinic: as scheduled    Length of Service (minutes): 45

## 2023-05-21 ENCOUNTER — PATIENT MESSAGE (OUTPATIENT)
Dept: ORTHOPEDICS | Facility: CLINIC | Age: 58
End: 2023-05-21
Payer: OTHER GOVERNMENT

## 2023-05-23 ENCOUNTER — HOSPITAL ENCOUNTER (OUTPATIENT)
Dept: RADIOLOGY | Facility: HOSPITAL | Age: 58
Discharge: HOME | End: 2023-05-23
Attending: ORTHOPAEDIC SURGERY
Payer: OTHER GOVERNMENT

## 2023-05-23 ENCOUNTER — TELEPHONE (OUTPATIENT)
Dept: PAIN MEDICINE | Facility: CLINIC | Age: 58
End: 2023-05-23
Payer: OTHER GOVERNMENT

## 2023-05-23 ENCOUNTER — PATIENT MESSAGE (OUTPATIENT)
Dept: ORTHOPEDICS | Facility: CLINIC | Age: 58
End: 2023-05-23
Payer: OTHER GOVERNMENT

## 2023-05-23 DIAGNOSIS — M54.16 LUMBAR RADICULOPATHY, CHRONIC: ICD-10-CM

## 2023-05-23 DIAGNOSIS — M54.16 LUMBAR RADICULOPATHY, CHRONIC: Primary | ICD-10-CM

## 2023-05-23 DIAGNOSIS — M51.36 DDD (DEGENERATIVE DISC DISEASE), LUMBAR: Primary | ICD-10-CM

## 2023-05-23 DIAGNOSIS — M54.16 LUMBAR RADICULOPATHY: ICD-10-CM

## 2023-05-23 PROCEDURE — 72148 MRI LUMBAR SPINE W/O DYE: CPT | Mod: TC

## 2023-05-23 PROCEDURE — 72148 MRI LUMBAR SPINE WITHOUT CONTRAST: ICD-10-PCS | Mod: 26,,, | Performed by: INTERNAL MEDICINE

## 2023-05-23 PROCEDURE — 72148 MRI LUMBAR SPINE W/O DYE: CPT | Mod: 26,,, | Performed by: INTERNAL MEDICINE

## 2023-05-23 NOTE — PROGRESS NOTES
Spoke with pt virtually. He continues to have worsening low back and left leg pain with numbness, tingling, weakness. MRI lumbar demonstrates left paracentral extrusion at L4-5 with caudal migration resulting in severe left neural foraminal narrowing. Also shows mild left disc bulge at L5-S1 resulting in moderate left neural foraminal narrowing. Pt has tried gabapentin, robaxin, and medrol dose pack and home exercises with worsening of pain. I provided the patient with a home exercise program. It is the [employer] spine conditioning program. Exercises include head rolls, kneeling back extension, sitting rotation stretch, modified seated side straddle, knee to chest, bird dog, plank, modified seated plank, hip bridges, abdominal bracing, and abdominal crunch. Pt completes each exercise daily for 1 hour with worsening of pain. Pain is 10/10. Will order STAT left L4-5 L5-S1 TFESI with pain management to try to avoid surgical intervention.

## 2023-05-24 ENCOUNTER — HOSPITAL ENCOUNTER (OUTPATIENT)
Facility: HOSPITAL | Age: 58
Discharge: HOME | End: 2023-05-24
Attending: PAIN MEDICINE | Admitting: PAIN MEDICINE
Payer: OTHER GOVERNMENT

## 2023-05-24 VITALS
OXYGEN SATURATION: 100 % | RESPIRATION RATE: 18 BRPM | TEMPERATURE: 98 F | SYSTOLIC BLOOD PRESSURE: 137 MMHG | DIASTOLIC BLOOD PRESSURE: 84 MMHG | HEART RATE: 74 BPM

## 2023-05-24 DIAGNOSIS — M54.16 LUMBAR RADICULOPATHY: Primary | ICD-10-CM

## 2023-05-24 PROCEDURE — 25000003 PHARM REV CODE 250: Performed by: PAIN MEDICINE

## 2023-05-24 PROCEDURE — 63600175 PHARM REV CODE 636 W HCPCS: Performed by: PAIN MEDICINE

## 2023-05-24 PROCEDURE — 64483 NJX AA&/STRD TFRM EPI L/S 1: CPT | Mod: LT,,, | Performed by: PAIN MEDICINE

## 2023-05-24 PROCEDURE — 25500020 PHARM REV CODE 255: Performed by: PAIN MEDICINE

## 2023-05-24 PROCEDURE — 64483 PR EPIDURAL INJ, ANES/STEROID, TRANSFORAMINAL, LUMB/SACR, SNGL LEVL: ICD-10-PCS | Mod: LT,,, | Performed by: PAIN MEDICINE

## 2023-05-24 PROCEDURE — 64483 NJX AA&/STRD TFRM EPI L/S 1: CPT | Mod: LT | Performed by: PAIN MEDICINE

## 2023-05-24 RX ORDER — ALPRAZOLAM 0.5 MG/1
1 TABLET, ORALLY DISINTEGRATING ORAL ONCE AS NEEDED
Status: DISCONTINUED | OUTPATIENT
Start: 2023-05-24 | End: 2023-05-24 | Stop reason: HOSPADM

## 2023-05-24 RX ORDER — LIDOCAINE HYDROCHLORIDE 20 MG/ML
10 INJECTION, SOLUTION INFILTRATION; PERINEURAL ONCE
Status: DISCONTINUED | OUTPATIENT
Start: 2023-05-24 | End: 2023-05-24 | Stop reason: ALTCHOICE

## 2023-05-24 RX ORDER — LIDOCAINE HYDROCHLORIDE 10 MG/ML
INJECTION, SOLUTION EPIDURAL; INFILTRATION; INTRACAUDAL; PERINEURAL
Status: DISCONTINUED
Start: 2023-05-24 | End: 2023-05-24 | Stop reason: HOSPADM

## 2023-05-24 RX ORDER — LIDOCAINE HYDROCHLORIDE 10 MG/ML
INJECTION INFILTRATION; PERINEURAL
Status: DISCONTINUED
Start: 2023-05-24 | End: 2023-05-24 | Stop reason: HOSPADM

## 2023-05-24 RX ORDER — DEXAMETHASONE SODIUM PHOSPHATE 10 MG/ML
10 INJECTION INTRAMUSCULAR; INTRAVENOUS ONCE
Status: COMPLETED | OUTPATIENT
Start: 2023-05-24 | End: 2023-05-24

## 2023-05-24 RX ORDER — LIDOCAINE HYDROCHLORIDE 10 MG/ML
10 INJECTION INFILTRATION; PERINEURAL ONCE
Status: COMPLETED | OUTPATIENT
Start: 2023-05-24 | End: 2023-05-24

## 2023-05-24 RX ORDER — DEXAMETHASONE SODIUM PHOSPHATE 10 MG/ML
INJECTION INTRAMUSCULAR; INTRAVENOUS
Status: DISCONTINUED
Start: 2023-05-24 | End: 2023-05-24 | Stop reason: HOSPADM

## 2023-05-24 RX ORDER — LIDOCAINE HYDROCHLORIDE 10 MG/ML
1 INJECTION, SOLUTION EPIDURAL; INFILTRATION; INTRACAUDAL; PERINEURAL ONCE
Status: COMPLETED | OUTPATIENT
Start: 2023-05-24 | End: 2023-05-24

## 2023-05-24 NOTE — DISCHARGE INSTRUCTIONS

## 2023-05-24 NOTE — OP NOTE
Lumbar transforaminal JERONIMO    Time-out taken to identify patient and procedure side prior to starting the procedure.   I attest that I have reviewed the patient's home medications prior to the procedure and no contraindication have been identified. I  re-evaluated the patient after the patient was positioned for the procedure in the procedure room immediately before the procedural time-out. The vital signs are current and represent the current state of the patient which has not significantly changed since the preprocedure assessment.                              Date of Service: 05/24/2023    PCP: Troy Heart MD    Referring Physician:                                     PROCEDURE: Left L4 and L5 transforaminal epidural steroid injection under fluoroscopy    REASON FOR PROCEDURE: Left DDD (degenerative disc disease), lumbar [M51.36]  Lumbar radiculopathy [M54.16]    PHYSICIAN: Al Batres MD  ASSISTANTS:None    MEDICATIONS INJECTED:  Preservative-free dexamethasone 10mg, Xylocaine 1% MPF 3-5ml. 3ml per level. Preservative free, sterile normal saline is used to get larger volume as needed.  LOCAL ANESTHETIC INJECTED:  Xylocaine 1%  3ml per site.    SEDATION MEDICATIONS: None  ESTIMATED BLOOD LOSS:  None.    COMPLICATIONS:  None.    TECHNIQUE:   Laying in a prone position, the patient was prepped and draped in the usual sterile fashion using ChloraPrep and fenestrated drape.  The area to be injected was determined under fluoroscopic guidance.  Local anesthetic was given by raising a wheel and going down to the hub of a 27-gauge 1.25 inch needle.  The 4.75 inch 25-gauge spinal needle was introduced towards the transverse process of each above named nerve root level.  The needle was walked medially then hinged into the neural foramen.  Omnipaque was injected to confirm appropriate placement and that there was no vascular runoff.  The medication was then injected after applying negative pressure. The patient  tolerated the procedure well.    PAIN BEFORE THE PROCEDURE: 10/10.    PAIN AFTER THE PROCEDURE: 7/10.    The patient was monitored after the procedure.  Patient was given post procedure and discharge instructions to follow at home.  We will see the patient back in two weeks or the patient may call to inform of status. The patient was discharged in a stable condition.

## 2023-05-24 NOTE — DISCHARGE SUMMARY
[facility] [place] [facility] End[facility]  Discharge Note  Short Stay    Procedure(s) (LRB):  Left L4 + L5 Transforaminal Epidural Steroid Injections (ref: Da) (Left)      OUTCOME: Patient tolerated treatment/procedure well without complication and is now ready for discharge.    DISPOSITION: Home or Self Care    FINAL DIAGNOSIS:  <principal problem not specified>    FOLLOWUP: In clinic    DISCHARGE INSTRUCTIONS:  No discharge procedures on file.       Discharge Diagnosis:DDD (degenerative disc disease), lumbar [M51.36]  Lumbar radiculopathy [M54.16]  Condition on Discharge: Stable.  Diet on Discharge: Same as before.  Activity: as per instruction sheet.  Discharge to: Home with a responsible adult.  Follow up: as per Discharge instructions

## 2023-05-30 ENCOUNTER — PATIENT MESSAGE (OUTPATIENT)
Dept: ORTHOPEDICS | Facility: CLINIC | Age: 58
End: 2023-05-30
Payer: OTHER GOVERNMENT

## 2023-05-30 ENCOUNTER — PATIENT MESSAGE (OUTPATIENT)
Dept: PHYSICAL MEDICINE AND REHAB | Facility: CLINIC | Age: 58
End: 2023-05-30
Payer: OTHER GOVERNMENT

## 2023-05-30 ENCOUNTER — OFFICE VISIT (OUTPATIENT)
Dept: INTERNAL MEDICINE | Facility: CLINIC | Age: 58
End: 2023-05-30
Payer: OTHER GOVERNMENT

## 2023-05-30 VITALS
OXYGEN SATURATION: 98 % | HEIGHT: 69 IN | HEART RATE: 70 BPM | BODY MASS INDEX: 27.3 KG/M2 | DIASTOLIC BLOOD PRESSURE: 86 MMHG | SYSTOLIC BLOOD PRESSURE: 138 MMHG | WEIGHT: 184.31 LBS

## 2023-05-30 DIAGNOSIS — I10 PRIMARY HYPERTENSION: Primary | ICD-10-CM

## 2023-05-30 DIAGNOSIS — F90.2 ATTENTION DEFICIT HYPERACTIVITY DISORDER (ADHD), COMBINED TYPE: ICD-10-CM

## 2023-05-30 DIAGNOSIS — E78.2 MIXED HYPERLIPIDEMIA: ICD-10-CM

## 2023-05-30 PROCEDURE — 99214 PR OFFICE/OUTPT VISIT, EST, LEVL IV, 30-39 MIN: ICD-10-PCS | Mod: S$PBB,,, | Performed by: FAMILY MEDICINE

## 2023-05-30 PROCEDURE — 99214 OFFICE O/P EST MOD 30 MIN: CPT | Mod: S$PBB,,, | Performed by: FAMILY MEDICINE

## 2023-05-30 PROCEDURE — 99999 PR PBB SHADOW E&M-EST. PATIENT-LVL IV: ICD-10-PCS | Mod: PBBFAC,,, | Performed by: FAMILY MEDICINE

## 2023-05-30 PROCEDURE — 99214 OFFICE O/P EST MOD 30 MIN: CPT | Mod: PBBFAC | Performed by: FAMILY MEDICINE

## 2023-05-30 PROCEDURE — 99999 PR PBB SHADOW E&M-EST. PATIENT-LVL IV: CPT | Mod: PBBFAC,,, | Performed by: FAMILY MEDICINE

## 2023-05-30 RX ORDER — DEXTROAMPHETAMINE SACCHARATE, AMPHETAMINE ASPARTATE MONOHYDRATE, DEXTROAMPHETAMINE SULFATE AND AMPHETAMINE SULFATE 2.5; 2.5; 2.5; 2.5 MG/1; MG/1; MG/1; MG/1
CAPSULE, EXTENDED RELEASE ORAL
Qty: 30 CAPSULE | Refills: 0 | Status: SHIPPED | OUTPATIENT
Start: 2023-06-17 | End: 2023-09-13

## 2023-05-30 RX ORDER — DEXTROAMPHETAMINE SACCHARATE, AMPHETAMINE ASPARTATE MONOHYDRATE, DEXTROAMPHETAMINE SULFATE AND AMPHETAMINE SULFATE 2.5; 2.5; 2.5; 2.5 MG/1; MG/1; MG/1; MG/1
CAPSULE, EXTENDED RELEASE ORAL
Qty: 30 CAPSULE | Refills: 0 | Status: SHIPPED | OUTPATIENT
Start: 2023-07-16 | End: 2023-09-13

## 2023-05-30 RX ORDER — DEXTROAMPHETAMINE SACCHARATE, AMPHETAMINE ASPARTATE MONOHYDRATE, DEXTROAMPHETAMINE SULFATE AND AMPHETAMINE SULFATE 7.5; 7.5; 7.5; 7.5 MG/1; MG/1; MG/1; MG/1
30 CAPSULE, EXTENDED RELEASE ORAL EVERY MORNING
Qty: 30 CAPSULE | Refills: 0 | Status: SHIPPED | OUTPATIENT
Start: 2023-08-15 | End: 2023-09-13

## 2023-05-30 RX ORDER — DEXTROAMPHETAMINE SACCHARATE, AMPHETAMINE ASPARTATE MONOHYDRATE, DEXTROAMPHETAMINE SULFATE AND AMPHETAMINE SULFATE 7.5; 7.5; 7.5; 7.5 MG/1; MG/1; MG/1; MG/1
30 CAPSULE, EXTENDED RELEASE ORAL EVERY MORNING
Qty: 30 CAPSULE | Refills: 0 | Status: SHIPPED | OUTPATIENT
Start: 2023-06-17 | End: 2023-09-13

## 2023-05-30 RX ORDER — DEXTROAMPHETAMINE SACCHARATE, AMPHETAMINE ASPARTATE MONOHYDRATE, DEXTROAMPHETAMINE SULFATE AND AMPHETAMINE SULFATE 7.5; 7.5; 7.5; 7.5 MG/1; MG/1; MG/1; MG/1
30 CAPSULE, EXTENDED RELEASE ORAL EVERY MORNING
Qty: 30 CAPSULE | Refills: 0 | Status: SHIPPED | OUTPATIENT
Start: 2023-07-16 | End: 2023-09-13

## 2023-05-30 RX ORDER — DEXTROAMPHETAMINE SACCHARATE, AMPHETAMINE ASPARTATE MONOHYDRATE, DEXTROAMPHETAMINE SULFATE AND AMPHETAMINE SULFATE 2.5; 2.5; 2.5; 2.5 MG/1; MG/1; MG/1; MG/1
CAPSULE, EXTENDED RELEASE ORAL
Qty: 30 CAPSULE | Refills: 0 | Status: SHIPPED | OUTPATIENT
Start: 2023-08-15 | End: 2023-09-13

## 2023-05-30 NOTE — PROGRESS NOTES
"Subjective:       Patient ID: Brice Byrd is a 57 y.o. male.    Chief Complaint: No chief complaint on file.    Patient is here for follow-up of their chronic diseases  Patient is here for follow-up of ADHD meds  Elevated LFTs followed by hepatology.  Stain intolerant, saw cardiology, on repatha, UQI=322   Latest Reference Range & Units Most Recent   LDL Cholesterol External 63.0 - 159.0 mg/dL 215.4 (H)  2/15/22 08:00   LDL Cholesterol (Beta Quantification), Serum mg/dL 179 (H)  6/23/22 08:44   (H): Data is abnormally high     Retired from    In his 40s had extensive testing and Dx ADHD and on adderall 20 AM, 10 afternoon with excellent results on thinking and QoL, has about 30d left on his terminal Rx form [facility]. So will give him 30d Rx to fill on 7/15 and another for 8/15. Then will see him on 9/15 and q 3 mo thereafter.  There is a 2/24/22 letter from [facility] saying:   "Mr. Brice Byrd is a patient of [facility] and his physician Dr. Al Guerra is aware that he takes Adderall 20 mg as needed and notes that this medication will not affect his SVT heart condition."   checked, last Rx filled 5/16/23  Dose has now been 30mg AM and 10mg PM          Review of patient's allergies indicates:   Allergen Reactions    Statins-hmg-coa reductase inhibitors Other (See Comments)     nausea, HA, diarrhea, tiredness       Social History     Tobacco Use    Smoking status: Former    Smokeless tobacco: Never   Substance Use Topics    Alcohol use: Never    Drug use: Never       No family history on file.    Past Surgical History:   Procedure Laterality Date    ABLATION N/A 2/18/2022    Procedure: Ablation;  Surgeon: Al Guerra MD;  Location: [facility] EP LAB;  Service: Cardiology;  Laterality: N/A;  SVT, RFA, JERONIMO, anes, MB, 3prep    EPIDURAL STEROID INJECTION Left 5/24/2023    Procedure: Left L4 + L5 Transforaminal Epidural Steroid Injections (ref: Roberson);  Surgeon: Al Batres Jr., " MD;  Location: [facility];  Service: Pain Management;  Laterality: Left;  @1330  No ATC or DM  Needs Consent    VASECTOMY         Patient Active Problem List   Diagnosis    Transaminitis    Syncope and collapse    Primary hypertension    SVT (supraventricular tachycardia)    Mixed hyperlipidemia    Status post catheter ablation of slow pathway    Attention deficit hyperactivity disorder (ADHD), combined type    Open angle with borderline findings, low risk, bilateral    Fatty liver    Mallet finger of right hand    PTSD (post-traumatic stress disorder)    Generalized anxiety disorder    Agatston CAC score, >400       Current Outpatient Medications on File Prior to Visit   Medication Sig Dispense Refill    chlorthalidone (HYGROTEN) 25 MG Tab Take 1 tablet (25 mg total) by mouth once daily. 90 tablet 3    dextroamphetamine-amphetamine (ADDERALL XR) 10 MG 24 hr capsule 1 capsule every afternoon 30 capsule 0    dextroamphetamine-amphetamine (ADDERALL XR) 10 MG 24 hr capsule 1 capsule every afternoon 30 capsule 0    dextroamphetamine-amphetamine (ADDERALL XR) 10 MG 24 hr capsule 1 capsule every afternoon 30 capsule 0    dextroamphetamine-amphetamine (ADDERALL XR) 30 MG 24 hr capsule Take 1 capsule (30 mg total) by mouth every morning. 30 capsule 0    dextroamphetamine-amphetamine (ADDERALL XR) 30 MG 24 hr capsule Take 1 capsule (30 mg total) by mouth every morning. 30 capsule 0    dextroamphetamine-amphetamine (ADDERALL XR) 30 MG 24 hr capsule Take 1 capsule (30 mg total) by mouth every morning. 30 capsule 0    evolocumab (REPATHA SURECLICK) 140 mg/mL PnIj Inject 1 mL (140 mg total) into the skin every 14 (fourteen) days. 2 mL 3    gabapentin (NEURONTIN) 600 MG tablet Take 1 tablet (600 mg total) by mouth 3 (three) times daily. 90 tablet 2    methocarbamoL (ROBAXIN) 750 MG Tab Take 1 tablet (750 mg total) by mouth nightly as needed. 30 tablet 0    methylPREDNISolone (MEDROL DOSEPACK) 4 mg tablet use as directed 1 each 0  "   sildenafiL (VIAGRA) 100 MG tablet Take 1 tablet (100 mg total) by mouth daily as needed for Erectile Dysfunction. 30 tablet 11    telmisartan (MICARDIS) 80 MG Tab Take 1 tablet (80 mg total) by mouth once daily. 90 tablet 3     Current Facility-Administered Medications on File Prior to Visit   Medication Dose Route Frequency Provider Last Rate Last Admin    sodium chloride 0.9% bolus 1,000 mL  1,000 mL Intravenous Once Li Stephen NP               Review of Systems   Constitutional:  Negative for chills and fever.   HENT:  Negative for ear pain.    Eyes:  Negative for pain.   Respiratory:  Negative for chest tightness.    Cardiovascular:  Negative for chest pain.   Gastrointestinal:  Negative for abdominal pain.   Genitourinary:  Negative for flank pain.   Musculoskeletal:  Negative for gait problem.   Neurological:  Positive for numbness. Negative for syncope.        Very bad sciatic pain today, had been working with spine clinic and already had one inj from pain mgmt with no benefits yet. He is anxious to contact them right after our appt for better pain control and to see spine surgeon for a better solution   Psychiatric/Behavioral:  Negative for behavioral problems.      Objective:    /86 (BP Location: Left arm, Patient Position: Sitting, BP Method: Medium (Manual))   Pulse 70   Ht 5' 9" (1.753 m)   Wt 83.6 kg (184 lb 4.9 oz)   SpO2 98%   BMI 27.22 kg/m²     Physical Exam  Constitutional:       Appearance: He is well-developed.   HENT:      Head: Normocephalic and atraumatic.   Cardiovascular:      Rate and Rhythm: Normal rate.      Heart sounds: Normal heart sounds.   Pulmonary:      Effort: No respiratory distress.      Breath sounds: Normal breath sounds. No wheezing or rales.   Abdominal:      Palpations: Abdomen is soft.   Musculoskeletal:      Cervical back: Neck supple.   Skin:     General: Skin is dry.   Neurological:      Mental Status: He is alert.   Psychiatric:         Behavior: " Behavior normal.       Assessment:       1. Primary hypertension    2. Mixed hyperlipidemia    3. Attention deficit hyperactivity disorder (ADHD), combined type        Plan:       Brice was seen today for follow-up.    Diagnoses and all orders for this visit:    Primary hypertension  -controlled, stable, no change in meds  -pressure is up a bit today, but in significant pain    Mixed hyperlipidemia  -on repatha, followed by cardiology    Attention deficit hyperactivity disorder (ADHD), combined type  -     dextroamphetamine-amphetamine (ADDERALL XR) 30 MG 24 hr capsule; Take 1 capsule (30 mg total) by mouth every morning.  -     dextroamphetamine-amphetamine (ADDERALL XR) 30 MG 24 hr capsule; Take 1 capsule (30 mg total) by mouth every morning.  -     dextroamphetamine-amphetamine (ADDERALL XR) 30 MG 24 hr capsule; Take 1 capsule (30 mg total) by mouth every morning.  -     dextroamphetamine-amphetamine (ADDERALL XR) 10 MG 24 hr capsule; 1 capsule every afternoon  -     dextroamphetamine-amphetamine (ADDERALL XR) 10 MG 24 hr capsule; 1 capsule every afternoon  -     dextroamphetamine-amphetamine (ADDERALL XR) 10 MG 24 hr capsule; 1 capsule every afternoon #30, no RF.  One Rx for 1 month supply was printed for filling 6/17/23. A second was printed for filling 30d later. A third was printed for filling 60d later. Each Rx was dated today. F/u in 3 months. No refills are possible by telephone, must be seen in person.    Follow up in about 3 months (around 9/13/2023) for adhd meds.

## 2023-05-31 ENCOUNTER — PATIENT MESSAGE (OUTPATIENT)
Dept: ADMINISTRATIVE | Facility: OTHER | Age: 58
End: 2023-05-31
Payer: OTHER GOVERNMENT

## 2023-05-31 ENCOUNTER — OFFICE VISIT (OUTPATIENT)
Dept: ORTHOPEDICS | Facility: CLINIC | Age: 58
End: 2023-05-31
Payer: OTHER GOVERNMENT

## 2023-05-31 VITALS — BODY MASS INDEX: 27.26 KG/M2 | WEIGHT: 184.06 LBS | HEIGHT: 69 IN

## 2023-05-31 DIAGNOSIS — M51.16 LUMBAR DISC HERNIATION WITH RADICULOPATHY: Primary | ICD-10-CM

## 2023-05-31 DIAGNOSIS — M47.816 LUMBAR SPONDYLOSIS: ICD-10-CM

## 2023-05-31 DIAGNOSIS — M51.36 DDD (DEGENERATIVE DISC DISEASE), LUMBAR: ICD-10-CM

## 2023-05-31 PROCEDURE — 99214 OFFICE O/P EST MOD 30 MIN: CPT | Mod: S$PBB,,, | Performed by: ORTHOPAEDIC SURGERY

## 2023-05-31 PROCEDURE — 99999 PR PBB SHADOW E&M-EST. PATIENT-LVL IV: CPT | Mod: PBBFAC,,, | Performed by: ORTHOPAEDIC SURGERY

## 2023-05-31 PROCEDURE — 99214 PR OFFICE/OUTPT VISIT, EST, LEVL IV, 30-39 MIN: ICD-10-PCS | Mod: S$PBB,,, | Performed by: ORTHOPAEDIC SURGERY

## 2023-05-31 PROCEDURE — 99214 OFFICE O/P EST MOD 30 MIN: CPT | Mod: PBBFAC | Performed by: ORTHOPAEDIC SURGERY

## 2023-05-31 PROCEDURE — 99999 PR PBB SHADOW E&M-EST. PATIENT-LVL IV: ICD-10-PCS | Mod: PBBFAC,,, | Performed by: ORTHOPAEDIC SURGERY

## 2023-05-31 RX ORDER — PREGABALIN 100 MG/1
100 CAPSULE ORAL 2 TIMES DAILY
Qty: 60 CAPSULE | Refills: 1 | Status: SHIPPED | OUTPATIENT
Start: 2023-05-31 | End: 2023-06-16 | Stop reason: SDUPTHER

## 2023-05-31 NOTE — H&P (VIEW-ONLY)
DATE: 5/31/2023  PATIENT: Brice Byrd    Attending Physician: Marko Campbell M.D.    CHIEF COMPLAINT: LBP and LLE pain    HISTORY:  Brice Byrd is a 57 y.o. male w/ a hx of SVT (s/p ablation) presents for initial evaluation of low back and left leg pain (Back - 10, Leg - 10). The pain has been present for 3 weeks. The patient describes the pain as dull and it radiates posteriorly down L buttock and LLE to the foot.  The pain is worse with activity and improved by rest. There is LLE associated numbness and tingling. There is LLE subjective weakness. Prior treatments have included meds (robaxin, medrol dose pack, gabapentin), home exercises, L L4-S1 TFESI (helped 50% for 1 day), but no surgery. He cannot walk; he has been using a cane. He is miserable and wants surgical intervention.    The Patient denies myelopathic symptoms such as handwriting changes or difficulty with buttons/coins/keys. Denies perineal paresthesias, bowel/bladder dysfunction.    The patient does not smoke, have DM or endorse IVDU. The patient is not on any blood thinners and does not take chronic narcotics. He is retired; he used to be in [employer]. He and his wife are planning on a trip to [place] from 6/20 to 6/30/23.    PAST MEDICAL/SURGICAL HISTORY:  Past Medical History:   Diagnosis Date    ADHD (attention deficit hyperactivity disorder)     Anxiety     Hx of psychiatric care     Hypertension     Psychiatric problem     PTSD (post-traumatic stress disorder)     SVT (supraventricular tachycardia)     Therapy      Past Surgical History:   Procedure Laterality Date    ABLATION N/A 2/18/2022    Procedure: Ablation;  Surgeon: Al Guerra MD;  Location: [facility] EP LAB;  Service: Cardiology;  Laterality: N/A;  SVT, RFA, JERONIMO, anes, MB, 3prep    EPIDURAL STEROID INJECTION Left 5/24/2023    Procedure: Left L4 + L5 Transforaminal Epidural Steroid Injections (ref: Roberson);  Surgeon: Al Batres Jr., MD;  Location:  [facility];  Service: Pain Management;  Laterality: Left;  @1330  No ATC or DM  Needs Consent    VASECTOMY         Current Medications:   Current Outpatient Medications:     chlorthalidone (HYGROTEN) 25 MG Tab, Take 1 tablet (25 mg total) by mouth once daily., Disp: 90 tablet, Rfl: 3    [START ON 6/17/2023] dextroamphetamine-amphetamine (ADDERALL XR) 10 MG 24 hr capsule, 1 capsule every afternoon, Disp: 30 capsule, Rfl: 0    [START ON 7/16/2023] dextroamphetamine-amphetamine (ADDERALL XR) 10 MG 24 hr capsule, 1 capsule every afternoon, Disp: 30 capsule, Rfl: 0    [START ON 8/15/2023] dextroamphetamine-amphetamine (ADDERALL XR) 10 MG 24 hr capsule, 1 capsule every afternoon, Disp: 30 capsule, Rfl: 0    [START ON 6/17/2023] dextroamphetamine-amphetamine (ADDERALL XR) 30 MG 24 hr capsule, Take 1 capsule (30 mg total) by mouth every morning., Disp: 30 capsule, Rfl: 0    [START ON 7/16/2023] dextroamphetamine-amphetamine (ADDERALL XR) 30 MG 24 hr capsule, Take 1 capsule (30 mg total) by mouth every morning., Disp: 30 capsule, Rfl: 0    [START ON 8/15/2023] dextroamphetamine-amphetamine (ADDERALL XR) 30 MG 24 hr capsule, Take 1 capsule (30 mg total) by mouth every morning., Disp: 30 capsule, Rfl: 0    evolocumab (REPATHA SURECLICK) 140 mg/mL PnIj, Inject 1 mL (140 mg total) into the skin every 14 (fourteen) days., Disp: 2 mL, Rfl: 3    methocarbamoL (ROBAXIN) 750 MG Tab, Take 1 tablet (750 mg total) by mouth nightly as needed., Disp: 30 tablet, Rfl: 0    methylPREDNISolone (MEDROL DOSEPACK) 4 mg tablet, use as directed, Disp: 1 each, Rfl: 0    sildenafiL (VIAGRA) 100 MG tablet, Take 1 tablet (100 mg total) by mouth daily as needed for Erectile Dysfunction., Disp: 30 tablet, Rfl: 11    telmisartan (MICARDIS) 80 MG Tab, Take 1 tablet (80 mg total) by mouth once daily., Disp: 90 tablet, Rfl: 3    pregabalin (LYRICA) 100 MG capsule, Take 1 capsule (100 mg total) by mouth 2 (two) times daily., Disp: 60 capsule, Rfl: 1  No  "current facility-administered medications for this visit.    Facility-Administered Medications Ordered in Other Visits:     sodium chloride 0.9% bolus 1,000 mL, 1,000 mL, Intravenous, Once, Li Stephen NP    Social History:   Social History     Socioeconomic History    Marital status:     Number of children: 3   Tobacco Use    Smoking status: Former    Smokeless tobacco: Never   Substance and Sexual Activity    Alcohol use: Never    Drug use: Never    Sexual activity: Yes     Partners: Female       REVIEW OF SYSTEMS:  Constitution: Negative. Negative for chills, fever and night sweats.   Cardiovascular: Negative for chest pain and syncope.   Respiratory: Negative for cough and shortness of breath.   Gastrointestinal: See HPI. Negative for nausea/vomiting. Negative for abdominal pain.  Genitourinary: See HPI. Negative for discoloration or dysuria.  Hematologic/Lymphatic: negative for bleeding/clotting disorders.   Musculoskeletal: Negative for falls and muscle weakness.   Neurological: See HPI. no history of seizures. no history of cranial surgery or shunts.  Neurological: See HPI. No seizures.   Endocrine: Negative for polydipsia, polyphagia and polyuria.   Allergic/Immunologic: Negative for hives and persistent infections.     EXAM:  Ht 5' 9" (1.753 m)   Wt 83.5 kg (184 lb 1.4 oz)   BMI 27.18 kg/m²     PHYSICAL EXAMINATION:    General: The patient is a 57 y.o. male in no apparent distress, the patient is orientatied to person, place and time.  Psych: Normal mood and affect  HEENT: Vision grossly intact, hearing intact to the spoken word.  Lungs: Respirations unlabored.  Gait: Normal station and gait, no difficulty with toe or heel walk.   Skin: Dorsal lumbar skin negative for rashes, lesions, hairy patches and surgical scars. There is lower lumbar tenderness to palpation.  Range of motion: Lumbar range of motion is acceptable.  Spinal Balance: Global saggital and coronal spinal balance acceptable, no " significant for scoliosis and kyphosis.  Musculoskeletal: No pain with the range of motion of the bilateral hips. No trochanteric tenderness to palpation.  Vascular: Bilateral lower extremities warm and well perfused, Dorsalis pedis pulses 2+ bilaterally.  Neurological: Normal strength and tone in all major motor groups in the bilateral lower extremities except for 4/5 in L HF and KE and 3/5 in L EHL/TA. Normal sensation to light touch in the L2-S1 dermatomes bilaterally.  Deep tendon reflexes symmetric 2+ in the bilateral lower extremities.  + L SLR    IMAGING:   Today I independently reviewed the following images and my interpretations are as follows:    AP, Lat and Flex/Ex  upright L-spine demonstrate significant lumbar spondylosis and DDD, worst in L3-S1.    MRI lumbar showed L4-5 left paracentral HNP that has migrated inferiorly behind L5 body. L5-S1 L disc bulge without significant stenosis.      Body mass index is 27.18 kg/m².  Hemoglobin A1C   Date Value Ref Range Status   11/23/2021 5.4 4.0 - 5.6 % Final     Comment:     ADA Screening Guidelines:  5.7-6.4%  Consistent with prediabetes  >or=6.5%  Consistent with diabetes    High levels of fetal hemoglobin interfere with the HbA1C  assay. Heterozygous hemoglobin variants (HbS, HgC, etc)do  not significantly interfere with this assay.   However, presence of multiple variants may affect accuracy.         ASSESSMENT/PLAN:    Brice was seen today for follow-up and pain.    Diagnoses and all orders for this visit:    Lumbar disc herniation with radiculopathy  -     pregabalin (LYRICA) 100 MG capsule; Take 1 capsule (100 mg total) by mouth 2 (two) times daily.  -     Case Request Operating Room: LAMINECTOMY, SPINE, LUMBAR, WITH DISCECTOMY  L4-5   JENNIFFER FRAME GUSTAVO RETRACTORS  SNS: SSEP/EMG    DDD (degenerative disc disease), lumbar    Lumbar spondylosis      No follow-ups on file.    Patient has L4-5 L paracentral HNP with LLE radiculopathy. I discussed the  natural history of their diagnoses as well as surgical and nonsurgical treatment options. I educated the patient on the importance of core/back strengthening, correct posture, bending/lifting ergonomics, and low-impact aerobic exercises (walking, elliptical, and aquatherapy). I prescribed lyrica. Continue medications and exercises. Patient has failed conservative management and is a candidate for L4-5 microdiscectomy.     I had a sit down discussion with the patient and wife regarding the above procedure. We specifically discussed the risks, benefits, and alternatives to surgery. We discussed the surgical procedure including the skin incision, discectomy and nerve decompression: they understand the risks include but are not limited to bleeding, infection, damage to arteries, veins and nerves, re-herniation, death, paralysis, blindness, spinal fluid leak, continued or worsening pain, the possible need for more surgery in the future as well as the possibility other unforseen and unknown complications. We talked about expected hospital stay and recovery period. All questions were answered; they understand and wish to proceed.      Marko Campbell MD  Orthopaedic Spine Surgeon  Department of Orthopaedic Surgery  [phone]

## 2023-05-31 NOTE — PROGRESS NOTES
Date of Surgery - 06/14/2023  Patient class - Outpatient  Provider - Marko Campbell  Procedure requested - Lumbar discectomy with laminectomy   Levels: L4-5  Plan of Care: same day d/c  Instrumentation -  none  Medical Necessity - not urgent  CPT codes: 07637  post procedural disposition - Amb Surgery/DOSC  estimated length of stay - 0 midnights    Patient first seen:  05/15/2023  Patient's most recent visit: 5/31/2023  Patient imaging: xrays, MRI.  Results:  MRI lumbar demonstrates left paracentral extrusion at L4-5 with caudal migration resulting in severe left neural foraminal narrowing.    Treatment failed: the patient has tried and failed conservative treatment including medications, physical therapy and ESIs.    Medications tried: gabapentin, lyrica, and robaxin  Activity Modification: stopped working out, decreased activity level, and tried sleeping in recliner  Home exercises: Yes.  Worsening of pain  Epidural steroid injections: Yes.  Dates: 5/24/23 Relief: minimal  Functional impairment of ADLs: The patient's severe pain is affecting their quality of life and limiting their daily life, including working and ability to provide self care.       Pertinent physical exam findings: progressive neurological deficits, unsteady gait, lower extremity weakness, and positive straight leg raise    *please upload patient clinicals including all notes from Lluvia Rudolph PA-C, Toya Roberson PA-C, M. Janice Holloway NP, Sadi Calderon MD, and/or Mrako Campbell MD.  Please also include any and all physical therapy and pain management notes.

## 2023-05-31 NOTE — PROGRESS NOTES
DATE: 5/31/2023  PATIENT: Brice Byrd    Attending Physician: Marko Campbell M.D.    CHIEF COMPLAINT: LBP and LLE pain    HISTORY:  Brice Byrd is a 57 y.o. male w/ a hx of SVT (s/p ablation) presents for initial evaluation of low back and left leg pain (Back - 10, Leg - 10). The pain has been present for 3 weeks. The patient describes the pain as dull and it radiates posteriorly down L buttock and LLE to the foot.  The pain is worse with activity and improved by rest. There is LLE associated numbness and tingling. There is LLE subjective weakness. Prior treatments have included meds (robaxin, medrol dose pack, gabapentin), home exercises, L L4-S1 TFESI (helped 50% for 1 day), but no surgery. He cannot walk; he has been using a cane. He is miserable and wants surgical intervention.    The Patient denies myelopathic symptoms such as handwriting changes or difficulty with buttons/coins/keys. Denies perineal paresthesias, bowel/bladder dysfunction.    The patient does not smoke, have DM or endorse IVDU. The patient is not on any blood thinners and does not take chronic narcotics. He is retired; he used to be in [employer]. He and his wife are planning on a trip to [place] from 6/20 to 6/30/23.    PAST MEDICAL/SURGICAL HISTORY:  Past Medical History:   Diagnosis Date    ADHD (attention deficit hyperactivity disorder)     Anxiety     Hx of psychiatric care     Hypertension     Psychiatric problem     PTSD (post-traumatic stress disorder)     SVT (supraventricular tachycardia)     Therapy      Past Surgical History:   Procedure Laterality Date    ABLATION N/A 2/18/2022    Procedure: Ablation;  Surgeon: Al Guerra MD;  Location: [facility] EP LAB;  Service: Cardiology;  Laterality: N/A;  SVT, RFA, JERONIMO, anes, MB, 3prep    EPIDURAL STEROID INJECTION Left 5/24/2023    Procedure: Left L4 + L5 Transforaminal Epidural Steroid Injections (ref: [facility]erson);  Surgeon: Al Batres Jr., MD;  Location:  [facility];  Service: Pain Management;  Laterality: Left;  @1330  No ATC or DM  Needs Consent    VASECTOMY         Current Medications:   Current Outpatient Medications:     chlorthalidone (HYGROTEN) 25 MG Tab, Take 1 tablet (25 mg total) by mouth once daily., Disp: 90 tablet, Rfl: 3    [START ON 6/17/2023] dextroamphetamine-amphetamine (ADDERALL XR) 10 MG 24 hr capsule, 1 capsule every afternoon, Disp: 30 capsule, Rfl: 0    [START ON 7/16/2023] dextroamphetamine-amphetamine (ADDERALL XR) 10 MG 24 hr capsule, 1 capsule every afternoon, Disp: 30 capsule, Rfl: 0    [START ON 8/15/2023] dextroamphetamine-amphetamine (ADDERALL XR) 10 MG 24 hr capsule, 1 capsule every afternoon, Disp: 30 capsule, Rfl: 0    [START ON 6/17/2023] dextroamphetamine-amphetamine (ADDERALL XR) 30 MG 24 hr capsule, Take 1 capsule (30 mg total) by mouth every morning., Disp: 30 capsule, Rfl: 0    [START ON 7/16/2023] dextroamphetamine-amphetamine (ADDERALL XR) 30 MG 24 hr capsule, Take 1 capsule (30 mg total) by mouth every morning., Disp: 30 capsule, Rfl: 0    [START ON 8/15/2023] dextroamphetamine-amphetamine (ADDERALL XR) 30 MG 24 hr capsule, Take 1 capsule (30 mg total) by mouth every morning., Disp: 30 capsule, Rfl: 0    evolocumab (REPATHA SURECLICK) 140 mg/mL PnIj, Inject 1 mL (140 mg total) into the skin every 14 (fourteen) days., Disp: 2 mL, Rfl: 3    methocarbamoL (ROBAXIN) 750 MG Tab, Take 1 tablet (750 mg total) by mouth nightly as needed., Disp: 30 tablet, Rfl: 0    methylPREDNISolone (MEDROL DOSEPACK) 4 mg tablet, use as directed, Disp: 1 each, Rfl: 0    sildenafiL (VIAGRA) 100 MG tablet, Take 1 tablet (100 mg total) by mouth daily as needed for Erectile Dysfunction., Disp: 30 tablet, Rfl: 11    telmisartan (MICARDIS) 80 MG Tab, Take 1 tablet (80 mg total) by mouth once daily., Disp: 90 tablet, Rfl: 3    pregabalin (LYRICA) 100 MG capsule, Take 1 capsule (100 mg total) by mouth 2 (two) times daily., Disp: 60 capsule, Rfl: 1  No  "current facility-administered medications for this visit.    Facility-Administered Medications Ordered in Other Visits:     sodium chloride 0.9% bolus 1,000 mL, 1,000 mL, Intravenous, Once, Li Stephen NP    Social History:   Social History     Socioeconomic History    Marital status:     Number of children: 3   Tobacco Use    Smoking status: Former    Smokeless tobacco: Never   Substance and Sexual Activity    Alcohol use: Never    Drug use: Never    Sexual activity: Yes     Partners: Female       REVIEW OF SYSTEMS:  Constitution: Negative. Negative for chills, fever and night sweats.   Cardiovascular: Negative for chest pain and syncope.   Respiratory: Negative for cough and shortness of breath.   Gastrointestinal: See HPI. Negative for nausea/vomiting. Negative for abdominal pain.  Genitourinary: See HPI. Negative for discoloration or dysuria.  Hematologic/Lymphatic: negative for bleeding/clotting disorders.   Musculoskeletal: Negative for falls and muscle weakness.   Neurological: See HPI. no history of seizures. no history of cranial surgery or shunts.  Neurological: See HPI. No seizures.   Endocrine: Negative for polydipsia, polyphagia and polyuria.   Allergic/Immunologic: Negative for hives and persistent infections.     EXAM:  Ht 5' 9" (1.753 m)   Wt 83.5 kg (184 lb 1.4 oz)   BMI 27.18 kg/m²     PHYSICAL EXAMINATION:    General: The patient is a 57 y.o. male in no apparent distress, the patient is orientatied to person, place and time.  Psych: Normal mood and affect  HEENT: Vision grossly intact, hearing intact to the spoken word.  Lungs: Respirations unlabored.  Gait: Normal station and gait, no difficulty with toe or heel walk.   Skin: Dorsal lumbar skin negative for rashes, lesions, hairy patches and surgical scars. There is lower lumbar tenderness to palpation.  Range of motion: Lumbar range of motion is acceptable.  Spinal Balance: Global saggital and coronal spinal balance acceptable, no " significant for scoliosis and kyphosis.  Musculoskeletal: No pain with the range of motion of the bilateral hips. No trochanteric tenderness to palpation.  Vascular: Bilateral lower extremities warm and well perfused, Dorsalis pedis pulses 2+ bilaterally.  Neurological: Normal strength and tone in all major motor groups in the bilateral lower extremities except for 4/5 in L HF and KE and 3/5 in L EHL/TA. Normal sensation to light touch in the L2-S1 dermatomes bilaterally.  Deep tendon reflexes symmetric 2+ in the bilateral lower extremities.  + L SLR    IMAGING:   Today I independently reviewed the following images and my interpretations are as follows:    AP, Lat and Flex/Ex  upright L-spine demonstrate significant lumbar spondylosis and DDD, worst in L3-S1.    MRI lumbar showed L4-5 left paracentral HNP that has migrated inferiorly behind L5 body. L5-S1 L disc bulge without significant stenosis.      Body mass index is 27.18 kg/m².  Hemoglobin A1C   Date Value Ref Range Status   11/23/2021 5.4 4.0 - 5.6 % Final     Comment:     ADA Screening Guidelines:  5.7-6.4%  Consistent with prediabetes  >or=6.5%  Consistent with diabetes    High levels of fetal hemoglobin interfere with the HbA1C  assay. Heterozygous hemoglobin variants (HbS, HgC, etc)do  not significantly interfere with this assay.   However, presence of multiple variants may affect accuracy.         ASSESSMENT/PLAN:    Brice was seen today for follow-up and pain.    Diagnoses and all orders for this visit:    Lumbar disc herniation with radiculopathy  -     pregabalin (LYRICA) 100 MG capsule; Take 1 capsule (100 mg total) by mouth 2 (two) times daily.  -     Case Request Operating Room: LAMINECTOMY, SPINE, LUMBAR, WITH DISCECTOMY  L4-5   JENNIFFER FRAME GUSTAVO RETRACTORS  SNS: SSEP/EMG    DDD (degenerative disc disease), lumbar    Lumbar spondylosis      No follow-ups on file.    Patient has L4-5 L paracentral HNP with LLE radiculopathy. I discussed the  natural history of their diagnoses as well as surgical and nonsurgical treatment options. I educated the patient on the importance of core/back strengthening, correct posture, bending/lifting ergonomics, and low-impact aerobic exercises (walking, elliptical, and aquatherapy). I prescribed lyrica. Continue medications and exercises. Patient has failed conservative management and is a candidate for L4-5 microdiscectomy.     I had a sit down discussion with the patient and wife regarding the above procedure. We specifically discussed the risks, benefits, and alternatives to surgery. We discussed the surgical procedure including the skin incision, discectomy and nerve decompression: they understand the risks include but are not limited to bleeding, infection, damage to arteries, veins and nerves, re-herniation, death, paralysis, blindness, spinal fluid leak, continued or worsening pain, the possible need for more surgery in the future as well as the possibility other unforseen and unknown complications. We talked about expected hospital stay and recovery period. All questions were answered; they understand and wish to proceed.      Marko Campbell MD  Orthopaedic Spine Surgeon  Department of Orthopaedic Surgery  [phone]

## 2023-06-02 ENCOUNTER — PATIENT MESSAGE (OUTPATIENT)
Dept: ORTHOPEDICS | Facility: CLINIC | Age: 58
End: 2023-06-02
Payer: OTHER GOVERNMENT

## 2023-06-05 ENCOUNTER — TELEPHONE (OUTPATIENT)
Dept: INTERNAL MEDICINE | Facility: CLINIC | Age: 58
End: 2023-06-05
Payer: OTHER GOVERNMENT

## 2023-06-05 ENCOUNTER — TELEPHONE (OUTPATIENT)
Dept: PREADMISSION TESTING | Facility: HOSPITAL | Age: 58
End: 2023-06-05

## 2023-06-05 DIAGNOSIS — Z01.818 PREOPERATIVE TESTING: Primary | ICD-10-CM

## 2023-06-05 NOTE — DISCHARGE INSTRUCTIONS
Your surgery has been scheduled for:_______6/14/2023___________________________________    You should report to:  ____[facility], located on the [place] side of the first floor of the           [facility] ([phone])  ____[facility], located on the [facility] side of the            Second floor of the [facility] ([phone])  ____3rd Floor [facility] located on the [facility] side of the [facility] [phone]  __x__[facility]: located at [address] [place] [address]. [address].     Please Note   Tell your doctor if you take Aspirin, products containing Aspirin, herbal medications  or blood thinners, such as Coumadin, Ticlid, or Plavix.  (Consult your provider regarding holding or stopping before surgery).  Arrange for someone to drive you home following surgery.  You will not be allowed to leave the surgical facility alone or drive yourself home following sedation and anesthesia.    Before Surgery  Stop taking all herbal medications, vitamins, and supplements 7 days prior to surgery  No Motrin/Advil (Ibuprofen) 7 days before surgery  No Aleve (Naproxen) 7 days before surgery  Stop Taking Asprin, products containing Asprin __7___days before surgery  Stop taking blood thinners_______days before surgery  No Goody's/BC  Powder 7 days before surgery  Refrain from drinking alcoholic beverages for 24hours before and after surgery  Stop or limit smoking __7_______days before surgery  You may take Tylenol for pain    Night before Surgery  Do not eat or drink after midnight  Take a shower or bath (shower is recommended).  Bathe with Hibiclens soap or an antibacterial soap from the neck down.  If not supplied by your surgeon, hibiclens soap will need to be purchased over the counter in pharmacy.  Rinse soap off thoroughly.  Shampoo your hair with your regular shampoo    The Day of  Surgery  Take another bath or shower with hibiclens or any antibacterial soap, to reduce the chance of infection.  Take heart and blood pressure medications with a small sip of water, as advised by the perioperative team.  Do not take fluid pills  You may brush your teeth and rinse your mouth, but do not swall any additional water.   Do not apply perfumes, powder, body lotions or deodorant on the day of surgery.  Nail polish should be removed.  Do not wear makeup or moisturizer  Wear comfortable clothes, such as a button front shirt and loose fitting pants.  Leave all jewelry, including body piercings, and valuables at home.    Bring any devices you will neeed after surgery such as crutches or canes.  If you have sleep apnea, please bring your CPAP machine  In the event that your physical condition changes including the onset of a cold or respiratory illness, or if you have to delay or cancel your surgery, please notify your surgeon.     Anesthesia: General Anesthesia     You are watched continuously during your procedure by your anesthesia provider.     Youre due to have surgery. During surgery, youll be given medicine called anesthesia or anesthetic. This will keep you comfortable and pain-free. Your anesthesia provider will use general anesthesia.  What is general anesthesia?  General anesthesia puts you into a state like deep sleep. It goes into the bloodstream (IV anesthetics), into the lungs (gas anesthetics), or both. You feel nothing during the procedure. You will not remember it. During the procedure, the anesthesia provider monitors you continuously. He or she checks your heart rate and rhythm, blood pressure, breathing, and blood oxygen.  IV anesthetics. IV anesthetics are given through an IV line in your arm. Theyre often given first. This is so you are asleep before a gas anesthetic is started. Some kinds of IV anesthetics relieve pain. Others relax you. Your doctor will decide which kind is best in  your case.  Gas anesthetics. Gas anesthetics are breathed into the lungs. They are often used to keep you asleep. They can be given through a facemask or a tube placed in your larynx or trachea (breathing tube).  If you have a facemask, your anesthesia provider will most likely place it over your nose and mouth while youre still awake. Youll breathe oxygen through the mask as your IV anesthetic is started. Gas anesthetic may be added through the mask.  If you have a tube in the larynx or trachea, it will be inserted into your throat after youre asleep.  Anesthesia tools and medicines  You will likely have:  IV anesthetics. These are put into an IV line into your bloodstream.  Gas anesthetics. You breathe these anesthetics into your lungs, where they pass into your bloodstream.  Pulse oximeter. This is a small clip that is attached to the end of your finger. This measures your blood oxygen level.  Electrocardiography leads (electrodes). These are small sticky pads that are placed on your chest. They record your heart rate and rhythm.  Blood pressure cuff. This reads your blood pressure.  Risks and possible complications  General anesthesia has some risks. These include:  Breathing problems  Nausea and vomiting  Sore throat or hoarseness (usually temporary)  Allergic reaction to the anesthetic  Irregular heartbeat (rare)  Cardiac arrest (rare)   Anesthesia safety  Follow all instructions you are given for how long not to eat or drink before your procedure.  Be sure your doctor knows what medicines and drugs you take. This includes over-the-counter medicines, herbs, supplements, alcohol or other drugs. You will be asked when those were last taken.  Have an adult family member or friend drive you home after the procedure.  For the first 24 hours after your surgery:  Do not drive or use heavy equipment.  Do not make important decisions or sign legal documents. If important decisions or signing legal documents is  necessary during the first 24 hours after surgery, have a trusted family member or spouse act on your behalf.  Avoid alcohol.  Have a responsible adult stay with you. He or she can watch for problems and help keep you safe.  Date Last Reviewed: 12/1/2016 © 2000-2017 [employer]. [address] [place] [address], [place] [address]. All rights reserved. This information is not intended as a substitute for professional medical care. Always follow your healthcare professional's instructions.

## 2023-06-05 NOTE — PRE-PROCEDURE INSTRUCTIONS
Patient stated has not had any problem with anesthesia in the past.  Will need medical clearance from your PCP, Dr. Troy Heart. He was recently seen on 5/30. Patient to call to see if can be cleared from last appt. If not, he will make an appt Will need poc appt, labs, ua,and ekg.  Our  will call to set up these appts.    Preop instructions given. Hold aspirin, aspirin containing products, nsaids(aleve, advil, motrin, ibuprofen, naprosyn, naproxen, voltaren, diclofenac), vitamins and supplements one week prior to surgery.     May take Tylenol.( Sent to [facility] portal)  Verbalizes understanding.

## 2023-06-05 NOTE — ANESTHESIA PAT ROS NOTE
06/05/2023  Brice Byrd is a 57 y.o., male, retired [employer], [facility]hesia Visit and Preop instruction prior to scheduled Lumbar Laminectomy l4-5 on 6/14/2023 @ [facility].       Pre-op Assessment     I have reviewed the Nursing Notes. I have reviewed the NPO Status.   I have reviewed the Medications.     Review of Systems  Anesthesia Hx:  No problems with previous Anesthesia   History of prior surgery of interest to airway management or planning:  Previous anesthesia: General 2/18/2022 ABLATION CHEST with general anesthesia.  Procedure performed at an [facility].      Airway issues documented on chart review include mask, easy     Denies Family Hx of Anesthesia complications.    Denies Personal Hx of Anesthesia complications.                    Social:  Former Smoker, No Alcohol Use       Hematology/Oncology:  Hematology Normal   Oncology Normal                                   EENT/Dental:  EENT/Dental Normal           Cardiovascular:  Exercise tolerance: good   Denies Pacemaker. Hypertension, well controlled   CAD       Denies Angina.     hyperlipidemia  Denies BURDEN.   Agatston CAC score, >400 Functional Capacity 4 METS                      Disorder of Cardiac Conduction HX SVT WITH SYNCOPE AND COLLAPSE--ABLATION 2.18.2022 Other Cardiac Conditions   Pulmonary:  Pulmonary Normal                       Renal/:   Denies Chronic Renal Disease. no renal calculi               Hepatic/GI:         Liver Disease, Fatty Liver     Transaminitis   Musculoskeletal:   Musculoskeletal General/Symptoms: low back pain.  Chronic carpal tunnel syndrome  Joint Disease:  Arthritis, Osteoarthritis Hand osteoarthritis        Lumbar Spine Disorders, Lumbar Disc Disease LUMBAR DISC HERNIATION WITH RADICULOPATHY  Neurological:    Pain         Osteoarthritis  Peripheral Neuropathy                           Endocrine:  Denies Diabetes. Denies Hypothyroidism.  Denies Hyperthyroidism.         Dermatological:  Skin Normal    Psych:  Psychiatric History anxiety depression PTSD  ADHD             Physical Exam  General: Well nourished, Cooperative, Alert and Oriented    Airway:  Mouth Opening: Normal  Tongue: Normal  Neck ROM: Normal ROM    Dental:  Intact    Chest/Lungs:  Clear to auscultation, Respiratory Distress    Heart:  Rhythm: Occasional Prematures  Sounds: Normal        Anesthesia Assessment: Preoperative EQUATION    Planned Procedure: Procedure(s) (LRB):  LAMINECTOMY, SPINE, LUMBAR, WITH DISCECTOMY L4-5  JENNIFFER FRAME GUSTAVO RETRACTORS SNS: SSEP/EMG (N/A)  Requested Anesthesia Type:General  Surgeon: Marko Campbell MD  Service: Orthopedics  Known or anticipated Date of Surgery:6/14/2023    Surgeon notes: reviewed    Electronic QUestionnaire Assessment completed via nurse interview with patient.        Triage considerations:       Previous anesthesia records:No problems and GENERAL  2/18/2022   Ablation (Chest)   Airway/Jaw/Neck:  Airway Findings: Mouth Opening: Normal Tongue: Normal  General Airway Assessment: Adult  Mallampati: III  Improves to II with phonation.  TM Distance: Normal, at least 6 cm  Jaw/Neck Findings:  Neck ROM: Normal ROM       Last PCP note: within 1 month , within [facility]   Subspecialty notes: Hepatology, Ortho, Psychiatry    Other important co-morbidities: PER Epic: HLD, HTN, and L-DDD, FATTY LIVER       Tests already available:  Available tests,  within 1 month , within 3 months , within [facility] .   5/23/2023 XRAY LUMBAR SPINE AP/LAT W F/E, MRI LUMBAR SPINE W/O CONTRAST, 6/14/2023 RENAL FUNCTION PANEL          Instructions given. (See in Nurse's note)    Optimization:  Anesthesia Preop Clinic Assessment  Indicated    Medical Opinion Indicated           Plan:    Testing:  CMP, EKG, Hematology Profile, PT/INR, PTT, T&S, and UA   Pre-anesthesia  visit       Visit focus: concerns in complex  and/or prolonged anesthesia, COMORBIDITIES     Consultation:Patient's PCP for re-evaluation or statement of optimization    Patient  has previously scheduled Medical Appointment:6/13 MARCELINO [facility]ENDERSON    Navigation: Tests Scheduled.TBD              Consults scheduled.TBD             Results will be tracked by Preop Clinic.  Peace Espana RN BSN      MEDICAL CLEARANCE/ DR. ISIDORO DYKES    Pt seen by me on 5/30/2023.  58yo M w/Hx HTN, SVT, dyslipidemia (CCS.400, intolerant of statins, on repatha from cardiology), ADHD, PTSD.  Dr Campbell planning L4-5 laminectomy with discectomy on 6/14      Pt is active, METS>4  No cardiac ischemic symptoms, no indication for further cardiac testing prior to surgery  No CHF on 11/23/21 echo  BP adequately controlled on telmisartan 80 + chlorthalidone 25mg  On 3/14/23 K=4.0, eGFR>60.   Pre-op has ordered labs expected tomorrow and an ECG.     Unless there are worrisome findings on pre-op labs/ECG, pt is cleared for surgery with benefits >potential risks.           ===  MYRA Rowland MD; MAURICIO Watson

## 2023-06-05 NOTE — TELEPHONE ENCOUNTER
Pt seen by me on 5/30/2023.  58yo M w/Hx HTN, SVT, dyslipidemia (CCS.400, intolerant of statins, on repatha from cardiology), ADHD, PTSD.  Dr Campbell planning L4-5 laminectomy with discectomy on 6/14     Pt is active, METS>4  No cardiac ischemic symptoms, no indication for further cardiac testing prior to surgery  No CHF on 11/23/21 echo  BP adequately controlled on telmisartan 80 + chlorthalidone 25mg  On 3/14/23 K=4.0, eGFR>60.   Pre-op has ordered labs expected tomorrow and an ECG.    Unless there are worrisome findings on pre-op labs/ECG, pt is cleared for surgery with benefits >potential risks.        ===  MYRA Rowland MD; MAURICIO LAMAS Staff  Cc: Peace Espana RN  Caller: Unspecified (Today,  9:52 AM)  Patient is scheduled for L4-5 laminectomy with discectomy on 6/14 with Dr. Campbell. ( Approximately 154 minutes of general anesthesia) He will need medical clearance. He was last seen by you on 5/30. May clear per chart. If not, please schedule a preop clearance appt.   Thanks!

## 2023-06-05 NOTE — TELEPHONE ENCOUNTER
----- Message from Peace Espana RN sent at 6/5/2023  9:52 AM CDT -----  Patient is scheduled for L4-5 laminectomy with discectomy on 6/14 with Dr. Campbell. ( Approximately 154 minutes of general anesthesia) He will need medical clearance. He was last seen by you on 5/30. May clear per chart. If not, please schedule a preop clearance appt.  Thanks!

## 2023-06-06 ENCOUNTER — HOSPITAL ENCOUNTER (OUTPATIENT)
Dept: CARDIOLOGY | Facility: CLINIC | Age: 58
Discharge: HOME | End: 2023-06-06
Payer: OTHER GOVERNMENT

## 2023-06-06 ENCOUNTER — HOSPITAL ENCOUNTER (OUTPATIENT)
Dept: PREADMISSION TESTING | Facility: HOSPITAL | Age: 58
Discharge: HOME | End: 2023-06-06
Attending: ORTHOPAEDIC SURGERY | Admitting: ORTHOPAEDIC SURGERY
Payer: OTHER GOVERNMENT

## 2023-06-06 VITALS
OXYGEN SATURATION: 100 % | BODY MASS INDEX: 28.86 KG/M2 | HEIGHT: 67 IN | WEIGHT: 183.88 LBS | TEMPERATURE: 99 F | HEART RATE: 72 BPM | SYSTOLIC BLOOD PRESSURE: 139 MMHG | DIASTOLIC BLOOD PRESSURE: 87 MMHG

## 2023-06-06 DIAGNOSIS — Z01.818 PREOPERATIVE TESTING: ICD-10-CM

## 2023-06-06 PROCEDURE — 93010 EKG 12-LEAD: ICD-10-PCS | Mod: S$PBB,,, | Performed by: INTERNAL MEDICINE

## 2023-06-06 PROCEDURE — 93005 ELECTROCARDIOGRAM TRACING: CPT | Mod: PBBFAC | Performed by: INTERNAL MEDICINE

## 2023-06-06 PROCEDURE — 93010 ELECTROCARDIOGRAM REPORT: CPT | Mod: S$PBB,,, | Performed by: INTERNAL MEDICINE

## 2023-06-07 ENCOUNTER — PATIENT MESSAGE (OUTPATIENT)
Dept: ADMINISTRATIVE | Facility: OTHER | Age: 58
End: 2023-06-07
Payer: OTHER GOVERNMENT

## 2023-06-08 ENCOUNTER — PATIENT MESSAGE (OUTPATIENT)
Dept: ORTHOPEDICS | Facility: CLINIC | Age: 58
End: 2023-06-08
Payer: OTHER GOVERNMENT

## 2023-06-08 DIAGNOSIS — M51.16 LUMBAR DISC HERNIATION WITH RADICULOPATHY: Primary | ICD-10-CM

## 2023-06-08 NOTE — PROGRESS NOTES
Spoke with pt virtually. He continues to have worsening low back and left leg pain with numbness, tingling, weakness. MRI lumbar demonstrates left paracentral extrusion at L4-5 with caudal migration resulting in severe left neural foraminal narrowing. Also shows mild left disc bulge at L5-S1 resulting in moderate left neural foraminal narrowing. Pt has tried gabapentin, robaxin, and medrol dose pack and home exercises with worsening of pain. I provided the patient with a home exercise program. It is the [facility] spine conditioning program. Exercises include head rolls, kneeling back extension, sitting rotation stretch, modified seated side straddle, knee to chest, bird dog, plank, modified seated plank, hip bridges, abdominal bracing, and abdominal crunch. Pt completes each exercise daily for 1 hour with worsening of pain. He had a left L4-5 TFESI on 5/24/23 with 75% relief for 2 weeks but continues to have pain. Planning on L4-5 discectomy on 7/12/23 but will repeat left L4-5 TFESI to hold patient over until surgery.

## 2023-06-09 ENCOUNTER — PATIENT MESSAGE (OUTPATIENT)
Dept: PAIN MEDICINE | Facility: CLINIC | Age: 58
End: 2023-06-09
Payer: OTHER GOVERNMENT

## 2023-06-09 ENCOUNTER — TELEPHONE (OUTPATIENT)
Dept: PAIN MEDICINE | Facility: CLINIC | Age: 58
End: 2023-06-09
Payer: OTHER GOVERNMENT

## 2023-06-09 DIAGNOSIS — M51.36 DDD (DEGENERATIVE DISC DISEASE), LUMBAR: Primary | ICD-10-CM

## 2023-06-09 DIAGNOSIS — M54.16 LUMBAR RADICULOPATHY: ICD-10-CM

## 2023-06-09 NOTE — TELEPHONE ENCOUNTER
Mr. Narvaez would like to schedule the left L4 (infraneural) TF JERONIMO ordered by PEG [facility] on 6/14/23- pending insurance auth.    Reviewed pre-procedure instructions with Denny Brice, as well as provided arrival time of 12:30p for 6/14/23.  All questions answered- verbalized understanding.

## 2023-06-09 NOTE — TELEPHONE ENCOUNTER
----- Message from Al Batres Jr., MD sent at 2023  9:12 AM CDT -----  Regarding: RE: Order for CHAPIN LOPEZ  Ok to schedule for left L4 infraneural TFESI. Thanks.   ----- Message -----  From: Mary Lou Sterling RN  Sent: 2023   8:48 AM CDT  To: Al Batres Jr., MD  Subject: FW: Order for CHAPIN LOPEZ                     ----- Message -----  From: Toya Roberson PA-C  Sent: 2023   8:44 AM CDT  To: [facility] [place] [facility] Schedulers  Subject: Order for CHAPIN LOPEZ                         Patient Name: CHAPIN LOPEZ([id number])  Sex: Male  : 1965      PCP: BRYAN DYKES    Center: \[facility] [place] [facility]\""     Types of orders made on 2023: Procedure Request    Order Date:2023  Ordering User:TOYA ROBERSON [[id number]]  Encounter Provider:Toya Roberson PA-C [[id number]]  Authorizing Pro  vider: Toya Roberson PA-C [[id number]]  Supervising Provider:OCTAVIA CORTES [[id number]]  Type of Supervision:Supervision Required  Department:[facility][[id number]]    Common Order Information  Procedure -> Transforaminal Injection (Specify level and laterality) Cmt: left             L4-5    Order Specific Information  Order: Procedure Order to Pain Management [Custom: [id number]]  Order #:          [id number]:   1 FUTURE    Priority: Routine  Class: Clinic Performed    Future Order Information      Expires on:2024            Expected by:2023                   Associated Diagnoses      M51.16 Lumbar disc herniation with radiculopathy      [facility]           Priority: Routine  Class: Clinic Performed    Future Order Information      Expires on:2024            Z1   Expected by:2023                   Associated Diagnoses      M51.16 Lumbar disc herniation with radiculopathy      Procedure -> Transforaminal Injection (Specify level and laterality) Cmt:                 left L4-5        [facility]

## 2023-06-12 ENCOUNTER — ANESTHESIA EVENT (OUTPATIENT)
Dept: SURGERY | Facility: HOSPITAL | Age: 58
End: 2023-06-12
Payer: OTHER GOVERNMENT

## 2023-06-12 NOTE — DISCHARGE INSTRUCTIONS

## 2023-06-14 ENCOUNTER — HOSPITAL ENCOUNTER (OUTPATIENT)
Facility: HOSPITAL | Age: 58
Discharge: HOME | End: 2023-06-14
Attending: PAIN MEDICINE | Admitting: PAIN MEDICINE
Payer: OTHER GOVERNMENT

## 2023-06-14 ENCOUNTER — ANESTHESIA (OUTPATIENT)
Dept: SURGERY | Facility: HOSPITAL | Age: 58
End: 2023-06-14
Payer: OTHER GOVERNMENT

## 2023-06-14 VITALS
RESPIRATION RATE: 17 BRPM | SYSTOLIC BLOOD PRESSURE: 140 MMHG | TEMPERATURE: 98 F | OXYGEN SATURATION: 97 % | DIASTOLIC BLOOD PRESSURE: 99 MMHG | HEART RATE: 70 BPM

## 2023-06-14 DIAGNOSIS — M54.16 LUMBAR RADICULOPATHY: Primary | ICD-10-CM

## 2023-06-14 PROCEDURE — 25000003 PHARM REV CODE 250: Performed by: PAIN MEDICINE

## 2023-06-14 PROCEDURE — 63600175 PHARM REV CODE 636 W HCPCS: Performed by: PAIN MEDICINE

## 2023-06-14 PROCEDURE — 64483 PR EPIDURAL INJ, ANES/STEROID, TRANSFORAMINAL, LUMB/SACR, SNGL LEVL: ICD-10-PCS | Mod: LT,,, | Performed by: PAIN MEDICINE

## 2023-06-14 PROCEDURE — 25500020 PHARM REV CODE 255: Performed by: PAIN MEDICINE

## 2023-06-14 PROCEDURE — 64483 NJX AA&/STRD TFRM EPI L/S 1: CPT | Mod: LT | Performed by: PAIN MEDICINE

## 2023-06-14 PROCEDURE — 64483 NJX AA&/STRD TFRM EPI L/S 1: CPT | Mod: LT,,, | Performed by: PAIN MEDICINE

## 2023-06-14 RX ORDER — LIDOCAINE HYDROCHLORIDE 10 MG/ML
INJECTION, SOLUTION EPIDURAL; INFILTRATION; INTRACAUDAL; PERINEURAL
Status: DISCONTINUED
Start: 2023-06-14 | End: 2023-06-14 | Stop reason: HOSPADM

## 2023-06-14 RX ORDER — LIDOCAINE HYDROCHLORIDE 10 MG/ML
1 INJECTION, SOLUTION EPIDURAL; INFILTRATION; INTRACAUDAL; PERINEURAL ONCE
Status: COMPLETED | OUTPATIENT
Start: 2023-06-14 | End: 2023-06-14

## 2023-06-14 RX ORDER — DEXAMETHASONE SODIUM PHOSPHATE 10 MG/ML
10 INJECTION INTRAMUSCULAR; INTRAVENOUS ONCE
Status: COMPLETED | OUTPATIENT
Start: 2023-06-14 | End: 2023-06-14

## 2023-06-14 RX ORDER — LIDOCAINE HYDROCHLORIDE 20 MG/ML
10 INJECTION, SOLUTION INFILTRATION; PERINEURAL ONCE
Status: DISCONTINUED | OUTPATIENT
Start: 2023-06-14 | End: 2023-06-14 | Stop reason: ALTCHOICE

## 2023-06-14 RX ORDER — LIDOCAINE HYDROCHLORIDE 10 MG/ML
10 INJECTION INFILTRATION; PERINEURAL ONCE
Status: COMPLETED | OUTPATIENT
Start: 2023-06-14 | End: 2023-06-14

## 2023-06-14 RX ORDER — ALPRAZOLAM 0.5 MG/1
1 TABLET, ORALLY DISINTEGRATING ORAL ONCE AS NEEDED
Status: DISCONTINUED | OUTPATIENT
Start: 2023-06-14 | End: 2023-06-14 | Stop reason: HOSPADM

## 2023-06-14 RX ORDER — DEXAMETHASONE SODIUM PHOSPHATE 10 MG/ML
INJECTION INTRAMUSCULAR; INTRAVENOUS
Status: DISCONTINUED
Start: 2023-06-14 | End: 2023-06-14 | Stop reason: HOSPADM

## 2023-06-14 RX ORDER — LIDOCAINE HYDROCHLORIDE 10 MG/ML
INJECTION INFILTRATION; PERINEURAL
Status: DISCONTINUED
Start: 2023-06-14 | End: 2023-06-14 | Stop reason: HOSPADM

## 2023-06-14 NOTE — DISCHARGE SUMMARY
[facility] - Endoscopy  Discharge Note  Short Stay    Procedure(s) (LRB):  Left L4 Transforaminal Epidural Steroid Injection (Left)      OUTCOME: Patient tolerated treatment/procedure well without complication and is now ready for discharge.    DISPOSITION: Home or Self Care    FINAL DIAGNOSIS:  <principal problem not specified>    FOLLOWUP: In clinic    DISCHARGE INSTRUCTIONS:  No discharge procedures on file.       Discharge Diagnosis:DDD (degenerative disc disease), lumbar [M51.36]  Lumbar radiculopathy [M54.16]  Condition on Discharge: Stable.  Diet on Discharge: Same as before.  Activity: as per instruction sheet.  Discharge to: Home with a responsible adult.  Follow up: as per Discharge instructions

## 2023-06-14 NOTE — OP NOTE
Lumbar transforaminal JERONIMO    Time-out taken to identify patient and procedure side prior to starting the procedure.   I attest that I have reviewed the patient's home medications prior to the procedure and no contraindication have been identified. I  re-evaluated the patient after the patient was positioned for the procedure in the procedure room immediately before the procedural time-out. The vital signs are current and represent the current state of the patient which has not significantly changed since the preprocedure assessment.                              Date of Service: 06/14/2023    PCP: Troy Heart MD    Referring Physician:                                     PROCEDURE: Left L4 transforaminal epidural steroid injection under fluoroscopy    REASON FOR PROCEDURE: Left DDD (degenerative disc disease), lumbar [M51.36]  Lumbar radiculopathy [M54.16]    PHYSICIAN: Al Batres MD    ASSISTANTS:None    MEDICATIONS INJECTED:  Preservative-free dexamethasone 10mg, Xylocaine 1% MPF 3-5ml. 3ml per level. Preservative free, sterile normal saline is used to get larger volume as needed.    LOCAL ANESTHETIC INJECTED:  Xylocaine 1% 3ml per site.    SEDATION MEDICATIONS: None    ESTIMATED BLOOD LOSS:  None.    COMPLICATIONS:  None.    TECHNIQUE:   Laying in a prone position, the patient was prepped and draped in the usual sterile fashion using ChloraPrep and fenestrated drape.  The area to be injected was determined under fluoroscopic guidance.  Local anesthetic was given by raising a wheel and going down to the hub of a 27-gauge 1.25 inch needle.  The 4.75 inch 25-gauge spinal needle was introduced towards the left superior articular process of L5.  The needle was walked medially then hinged into the neural foramen.  Omnipaque was injected to confirm appropriate placement and that there was no vascular runoff.  The medication was then injected after applying negative pressure. The patient tolerated the  procedure well.    PAIN BEFORE THE PROCEDURE: 8/10.    PAIN AFTER THE PROCEDURE: 6/10.    The patient was monitored after the procedure.  Patient was given post procedure and discharge instructions to follow at home.  We will see the patient back in two weeks or the patient may call to inform of status. The patient was discharged in a stable condition.

## 2023-06-16 DIAGNOSIS — M51.16 LUMBAR DISC HERNIATION WITH RADICULOPATHY: ICD-10-CM

## 2023-06-16 RX ORDER — PREGABALIN 100 MG/1
100 CAPSULE ORAL 2 TIMES DAILY
Qty: 60 CAPSULE | Refills: 1 | Status: SHIPPED | OUTPATIENT
Start: 2023-06-16 | End: 2023-06-23 | Stop reason: SDUPTHER

## 2023-06-16 RX ORDER — METHOCARBAMOL 750 MG/1
750 TABLET, FILM COATED ORAL 3 TIMES DAILY
Qty: 90 TABLET | Refills: 0 | Status: SHIPPED | OUTPATIENT
Start: 2023-06-16 | End: 2023-07-06

## 2023-06-19 ENCOUNTER — PATIENT MESSAGE (OUTPATIENT)
Dept: ORTHOPEDICS | Facility: CLINIC | Age: 58
End: 2023-06-19
Payer: OTHER GOVERNMENT

## 2023-06-19 DIAGNOSIS — M51.16 LUMBAR DISC HERNIATION WITH RADICULOPATHY: ICD-10-CM

## 2023-06-23 RX ORDER — PREGABALIN 100 MG/1
100 CAPSULE ORAL 2 TIMES DAILY
Qty: 60 CAPSULE | Refills: 1 | Status: SHIPPED | OUTPATIENT
Start: 2023-06-23 | End: 2023-08-31

## 2023-07-05 DIAGNOSIS — Z78.9 STATIN INTOLERANCE: Primary | ICD-10-CM

## 2023-07-05 DIAGNOSIS — R93.1 AGATSTON CORONARY ARTERY CALCIUM SCORE GREATER THAN 400: ICD-10-CM

## 2023-07-05 RX ORDER — EVOLOCUMAB 140 MG/ML
140 INJECTION, SOLUTION SUBCUTANEOUS
Qty: 6 ML | Refills: 3 | Status: SHIPPED | OUTPATIENT
Start: 2023-07-05

## 2023-07-06 RX ORDER — CELECOXIB 100 MG/1
100 CAPSULE ORAL 2 TIMES DAILY
Qty: 28 CAPSULE | Refills: 0 | Status: SHIPPED | OUTPATIENT
Start: 2023-07-06 | End: 2023-08-31

## 2023-07-06 RX ORDER — OXYCODONE HYDROCHLORIDE 5 MG/1
5 TABLET ORAL EVERY 6 HOURS PRN
Qty: 14 TABLET | Refills: 0 | Status: SHIPPED | OUTPATIENT
Start: 2023-07-06 | End: 2023-08-31

## 2023-07-06 RX ORDER — ACETAMINOPHEN 500 MG
500 TABLET ORAL 3 TIMES DAILY
Qty: 90 TABLET | Refills: 0 | Status: SHIPPED | OUTPATIENT
Start: 2023-07-06 | End: 2023-08-31

## 2023-07-06 RX ORDER — METHOCARBAMOL 500 MG/1
1000 TABLET, FILM COATED ORAL 3 TIMES DAILY
Qty: 90 TABLET | Refills: 0 | Status: SHIPPED | OUTPATIENT
Start: 2023-07-06 | End: 2023-08-31

## 2023-07-06 NOTE — H&P
DATE: 7/6/2023  PATIENT: Brice Byrd    Attending Physician: Marko Campbell M.D.    CHIEF COMPLAINT: LBP and LLE pain    HISTORY:  Brice Byrd is a 57 y.o. male w/ a hx of SVT (s/p ablation) presents for initial evaluation of low back and left leg pain (Back - 10, Leg - 10). The pain has been present for 3 weeks. The patient describes the pain as dull and it radiates posteriorly down L buttock and LLE to the foot.  The pain is worse with activity and improved by rest. There is LLE associated numbness and tingling. There is LLE subjective weakness. Prior treatments have included meds (robaxin, medrol dose pack, gabapentin), home exercises, L L4-S1 TFESI (helped 50% for 1 day), but no surgery. He cannot walk; he has been using a cane. He is miserable and wants surgical intervention.    The Patient denies myelopathic symptoms such as handwriting changes or difficulty with buttons/coins/keys. Denies perineal paresthesias, bowel/bladder dysfunction.    The patient does not smoke, have DM or endorse IVDU. The patient is not on any blood thinners and does not take chronic narcotics. He is retired; he used to be in [employer]. He and his wife are planning on a trip to [place] from 6/20 to 6/30/23.    PAST MEDICAL/SURGICAL HISTORY:  Past Medical History:   Diagnosis Date    ADHD (attention deficit hyperactivity disorder)     Anxiety     Hx of psychiatric care     Hypertension     Psychiatric problem     PTSD (post-traumatic stress disorder)     SVT (supraventricular tachycardia)     Therapy      Past Surgical History:   Procedure Laterality Date    ABLATION N/A 2/18/2022    Procedure: Ablation;  Surgeon: Al Guerra MD;  Location: [facility] EP LAB;  Service: Cardiology;  Laterality: N/A;  SVT, RFA, JERONIMO, anes, MB, 3prep    EPIDURAL STEROID INJECTION Left 5/24/2023    Procedure: Left L4 + L5 Transforaminal Epidural Steroid Injections (ref: Roberson);  Surgeon: Al Batres Jr., MD;  Location:  [facility] ENDO;  Service: Pain Management;  Laterality: Left;  @1330  No ATC or DM  Needs Consent    EPIDURAL STEROID INJECTION Left 6/14/2023    Procedure: Left L4 Transforaminal Epidural Steroid Injection;  Surgeon: Al Batres Jr., MD;  Location: [facility] ENDO;  Service: Pain Management;  Laterality: Left;  @1230  No ATC or DM  Needs COnsent    VASECTOMY         Current Medications: No current facility-administered medications for this encounter.    Current Outpatient Medications:     chlorthalidone (HYGROTEN) 25 MG Tab, Take 1 tablet (25 mg total) by mouth once daily., Disp: 90 tablet, Rfl: 3    dextroamphetamine-amphetamine (ADDERALL XR) 10 MG 24 hr capsule, 1 capsule every afternoon, Disp: 30 capsule, Rfl: 0    [START ON 7/16/2023] dextroamphetamine-amphetamine (ADDERALL XR) 10 MG 24 hr capsule, 1 capsule every afternoon, Disp: 30 capsule, Rfl: 0    [START ON 8/15/2023] dextroamphetamine-amphetamine (ADDERALL XR) 10 MG 24 hr capsule, 1 capsule every afternoon, Disp: 30 capsule, Rfl: 0    dextroamphetamine-amphetamine (ADDERALL XR) 30 MG 24 hr capsule, Take 1 capsule (30 mg total) by mouth every morning., Disp: 30 capsule, Rfl: 0    [START ON 7/16/2023] dextroamphetamine-amphetamine (ADDERALL XR) 30 MG 24 hr capsule, Take 1 capsule (30 mg total) by mouth every morning., Disp: 30 capsule, Rfl: 0    [START ON 8/15/2023] dextroamphetamine-amphetamine (ADDERALL XR) 30 MG 24 hr capsule, Take 1 capsule (30 mg total) by mouth every morning., Disp: 30 capsule, Rfl: 0    evolocumab (REPATHA SURECLICK) 140 mg/mL PnIj, Inject 1 mL (140 mg total) into the skin every 14 (fourteen) days., Disp: 6 mL, Rfl: 3    methocarbamoL (ROBAXIN) 750 MG Tab, Take 1 tablet (750 mg total) by mouth 3 (three) times daily., Disp: 90 tablet, Rfl: 0    pregabalin (LYRICA) 100 MG capsule, Take 1 capsule (100 mg total) by mouth 2 (two) times daily., Disp: 60 capsule, Rfl: 1    sildenafiL (VIAGRA) 100 MG tablet, Take 1 tablet (100 mg total) by  mouth daily as needed for Erectile Dysfunction., Disp: 30 tablet, Rfl: 11    telmisartan (MICARDIS) 80 MG Tab, Take 1 tablet (80 mg total) by mouth once daily., Disp: 90 tablet, Rfl: 3    Facility-Administered Medications Ordered in Other Encounters:     sodium chloride 0.9% bolus 1,000 mL, 1,000 mL, Intravenous, Once, Li Stephen NP    Social History:   Social History     Socioeconomic History    Marital status:     Number of children: 3   Tobacco Use    Smoking status: Former    Smokeless tobacco: Never   Substance and Sexual Activity    Alcohol use: Never    Drug use: Never    Sexual activity: Yes     Partners: Female       REVIEW OF SYSTEMS:  Constitution: Negative. Negative for chills, fever and night sweats.   Cardiovascular: Negative for chest pain and syncope.   Respiratory: Negative for cough and shortness of breath.   Gastrointestinal: See HPI. Negative for nausea/vomiting. Negative for abdominal pain.  Genitourinary: See HPI. Negative for discoloration or dysuria.  Hematologic/Lymphatic: negative for bleeding/clotting disorders.   Musculoskeletal: Negative for falls and muscle weakness.   Neurological: See HPI. no history of seizures. no history of cranial surgery or shunts.  Neurological: See HPI. No seizures.   Endocrine: Negative for polydipsia, polyphagia and polyuria.   Allergic/Immunologic: Negative for hives and persistent infections.     EXAM:  There were no vitals taken for this visit.    PHYSICAL EXAMINATION:    General: The patient is a 57 y.o. male in no apparent distress, the patient is orientatied to person, place and time.  Psych: Normal mood and affect  HEENT: Vision grossly intact, hearing intact to the spoken word.  Lungs: Respirations unlabored.  Gait: Normal station and gait, no difficulty with toe or heel walk.   Skin: Dorsal lumbar skin negative for rashes, lesions, hairy patches and surgical scars. There is lower lumbar tenderness to palpation.  Range of motion: Lumbar  range of motion is acceptable.  Spinal Balance: Global saggital and coronal spinal balance acceptable, no significant for scoliosis and kyphosis.  Musculoskeletal: No pain with the range of motion of the bilateral hips. No trochanteric tenderness to palpation.  Vascular: Bilateral lower extremities warm and well perfused, Dorsalis pedis pulses 2+ bilaterally.  Neurological: Normal strength and tone in all major motor groups in the bilateral lower extremities except for 4/5 in L HF and KE and 3/5 in L EHL/TA. Normal sensation to light touch in the L2-S1 dermatomes bilaterally.  Deep tendon reflexes symmetric 2+ in the bilateral lower extremities.  + L SLR    IMAGING:   Today I independently reviewed the following images and my interpretations are as follows:    AP, Lat and Flex/Ex  upright L-spine demonstrate significant lumbar spondylosis and DDD, worst in L3-S1.    MRI lumbar showed L4-5 left paracentral HNP that has migrated inferiorly behind L5 body. L5-S1 L disc bulge without significant stenosis.      There is no height or weight on file to calculate BMI.  Hemoglobin A1C   Date Value Ref Range Status   11/23/2021 5.4 4.0 - 5.6 % Final     Comment:     ADA Screening Guidelines:  5.7-6.4%  Consistent with prediabetes  >or=6.5%  Consistent with diabetes    High levels of fetal hemoglobin interfere with the HbA1C  assay. Heterozygous hemoglobin variants (HbS, HgC, etc)do  not significantly interfere with this assay.   However, presence of multiple variants may affect accuracy.         ASSESSMENT/PLAN:    Will proceed with L4-5 microdiscectomy.

## 2023-07-11 ENCOUNTER — TELEPHONE (OUTPATIENT)
Dept: ORTHOPEDICS | Facility: CLINIC | Age: 58
End: 2023-07-11
Payer: OTHER GOVERNMENT

## 2023-07-11 NOTE — TELEPHONE ENCOUNTER
Spoke with patient and informed her of surgery date and time and she agreed verbally. Informed patient of area to park and where to go that morning to sign in ( [facility] [address] in [place] [address] Floor. Check in to the left.  No eating or drinking after 12 am. And wash with Jo-Hex 4 or Hibiclens. Also wash hair night before surgery. Patient agreed verbally.   ARRIVAL TIME 5 AM

## 2023-07-12 ENCOUNTER — HOSPITAL ENCOUNTER (OUTPATIENT)
Facility: HOSPITAL | Age: 58
Discharge: HOME | End: 2023-07-12
Attending: ORTHOPAEDIC SURGERY | Admitting: ORTHOPAEDIC SURGERY
Payer: OTHER GOVERNMENT

## 2023-07-12 VITALS
HEIGHT: 69 IN | SYSTOLIC BLOOD PRESSURE: 158 MMHG | HEART RATE: 64 BPM | OXYGEN SATURATION: 83 % | DIASTOLIC BLOOD PRESSURE: 78 MMHG | BODY MASS INDEX: 27.11 KG/M2 | WEIGHT: 183 LBS | RESPIRATION RATE: 18 BRPM | TEMPERATURE: 98 F

## 2023-07-12 DIAGNOSIS — M51.26 LUMBAR DISC HERNIATION: Primary | ICD-10-CM

## 2023-07-12 LAB
ABO + RH BLD: NORMAL
BLD GP AB SCN CELLS X3 SERPL QL: NORMAL
SPECIMEN OUTDATE: NORMAL

## 2023-07-12 PROCEDURE — 71000015 HC POSTOP RECOV 1ST HR: Performed by: ORTHOPAEDIC SURGERY

## 2023-07-12 PROCEDURE — D9220A PRA ANESTHESIA: Mod: CRNA,,, | Performed by: NURSE ANESTHETIST, CERTIFIED REGISTERED

## 2023-07-12 PROCEDURE — 71000033 HC RECOVERY, INTIAL HOUR: Performed by: ORTHOPAEDIC SURGERY

## 2023-07-12 PROCEDURE — 63030 PR LAMINOTOMY,LUMBAR DISK,1 INTRSP: ICD-10-PCS | Mod: AS,LT,, | Performed by: ORTHOPAEDIC SURGERY

## 2023-07-12 PROCEDURE — 94761 N-INVAS EAR/PLS OXIMETRY MLT: CPT

## 2023-07-12 PROCEDURE — 36000711: Performed by: ORTHOPAEDIC SURGERY

## 2023-07-12 PROCEDURE — 25000003 PHARM REV CODE 250: Performed by: NURSE ANESTHETIST, CERTIFIED REGISTERED

## 2023-07-12 PROCEDURE — 37000008 HC ANESTHESIA 1ST 15 MINUTES: Performed by: ORTHOPAEDIC SURGERY

## 2023-07-12 PROCEDURE — 86900 BLOOD TYPING SEROLOGIC ABO: CPT | Performed by: ORTHOPAEDIC SURGERY

## 2023-07-12 PROCEDURE — D9220A PRA ANESTHESIA: ICD-10-PCS | Mod: CRNA,,, | Performed by: NURSE ANESTHETIST, CERTIFIED REGISTERED

## 2023-07-12 PROCEDURE — 63600175 PHARM REV CODE 636 W HCPCS: Performed by: STUDENT IN AN ORGANIZED HEALTH CARE EDUCATION/TRAINING PROGRAM

## 2023-07-12 PROCEDURE — 99900035 HC TECH TIME PER 15 MIN (STAT)

## 2023-07-12 PROCEDURE — 63030 LAMOT DCMPRN NRV RT 1 LMBR: CPT | Mod: LT,,, | Performed by: ORTHOPAEDIC SURGERY

## 2023-07-12 PROCEDURE — 63600175 PHARM REV CODE 636 W HCPCS: Performed by: ORTHOPAEDIC SURGERY

## 2023-07-12 PROCEDURE — 36000710: Performed by: ORTHOPAEDIC SURGERY

## 2023-07-12 PROCEDURE — 63600175 PHARM REV CODE 636 W HCPCS: Performed by: NURSE ANESTHETIST, CERTIFIED REGISTERED

## 2023-07-12 PROCEDURE — 27201423 OPTIME MED/SURG SUP & DEVICES STERILE SUPPLY: Performed by: ORTHOPAEDIC SURGERY

## 2023-07-12 PROCEDURE — 36415 COLL VENOUS BLD VENIPUNCTURE: CPT | Performed by: ORTHOPAEDIC SURGERY

## 2023-07-12 PROCEDURE — D9220A PRA ANESTHESIA: ICD-10-PCS | Mod: ANES,,, | Performed by: STUDENT IN AN ORGANIZED HEALTH CARE EDUCATION/TRAINING PROGRAM

## 2023-07-12 PROCEDURE — 25000003 PHARM REV CODE 250: Performed by: ORTHOPAEDIC SURGERY

## 2023-07-12 PROCEDURE — 37000009 HC ANESTHESIA EA ADD 15 MINS: Performed by: ORTHOPAEDIC SURGERY

## 2023-07-12 PROCEDURE — 63030 PR LAMINOTOMY,LUMBAR DISK,1 INTRSP: ICD-10-PCS | Mod: LT,,, | Performed by: ORTHOPAEDIC SURGERY

## 2023-07-12 PROCEDURE — 63030 LAMOT DCMPRN NRV RT 1 LMBR: CPT | Mod: AS,LT,, | Performed by: ORTHOPAEDIC SURGERY

## 2023-07-12 PROCEDURE — 25000003 PHARM REV CODE 250: Performed by: NURSE PRACTITIONER

## 2023-07-12 PROCEDURE — 25000003 PHARM REV CODE 250: Performed by: ANESTHESIOLOGY

## 2023-07-12 PROCEDURE — 00630 ANES PX LUMBAR REGION NOS: CPT | Performed by: ORTHOPAEDIC SURGERY

## 2023-07-12 PROCEDURE — D9220A PRA ANESTHESIA: Mod: ANES,,, | Performed by: STUDENT IN AN ORGANIZED HEALTH CARE EDUCATION/TRAINING PROGRAM

## 2023-07-12 RX ORDER — BUPIVACAINE HYDROCHLORIDE AND EPINEPHRINE 5; 5 MG/ML; UG/ML
INJECTION, SOLUTION EPIDURAL; INTRACAUDAL; PERINEURAL
Status: DISCONTINUED | OUTPATIENT
Start: 2023-07-12 | End: 2023-07-12

## 2023-07-12 RX ORDER — MUPIROCIN 20 MG/G
OINTMENT TOPICAL 2 TIMES DAILY
Status: DISCONTINUED | OUTPATIENT
Start: 2023-07-12 | End: 2023-07-12 | Stop reason: HOSPADM

## 2023-07-12 RX ORDER — FAMOTIDINE 20 MG/1
20 TABLET, FILM COATED ORAL 2 TIMES DAILY
Status: DISCONTINUED | OUTPATIENT
Start: 2023-07-12 | End: 2023-07-12 | Stop reason: HOSPADM

## 2023-07-12 RX ORDER — OXYCODONE HYDROCHLORIDE 5 MG/1
5 TABLET ORAL EVERY 6 HOURS PRN
Status: DISCONTINUED | OUTPATIENT
Start: 2023-07-12 | End: 2023-07-12 | Stop reason: HOSPADM

## 2023-07-12 RX ORDER — HALOPERIDOL 5 MG/ML
0.5 INJECTION INTRAMUSCULAR EVERY 10 MIN PRN
Status: DISCONTINUED | OUTPATIENT
Start: 2023-07-12 | End: 2023-07-12 | Stop reason: HOSPADM

## 2023-07-12 RX ORDER — FENTANYL CITRATE 50 UG/ML
25 INJECTION, SOLUTION INTRAMUSCULAR; INTRAVENOUS EVERY 5 MIN PRN
Status: DISCONTINUED | OUTPATIENT
Start: 2023-07-12 | End: 2023-07-12 | Stop reason: HOSPADM

## 2023-07-12 RX ORDER — SODIUM CHLORIDE 9 MG/ML
INJECTION, SOLUTION INTRAVENOUS CONTINUOUS
Status: DISCONTINUED | OUTPATIENT
Start: 2023-07-12 | End: 2023-07-12

## 2023-07-12 RX ORDER — EPHEDRINE SULFATE 50 MG/ML
INJECTION, SOLUTION INTRAVENOUS
Status: DISCONTINUED | OUTPATIENT
Start: 2023-07-12 | End: 2023-07-12

## 2023-07-12 RX ORDER — MIDAZOLAM HYDROCHLORIDE 1 MG/ML
INJECTION, SOLUTION INTRAMUSCULAR; INTRAVENOUS
Status: DISCONTINUED | OUTPATIENT
Start: 2023-07-12 | End: 2023-07-12

## 2023-07-12 RX ORDER — NEOSTIGMINE METHYLSULFATE 0.5 MG/ML
INJECTION, SOLUTION INTRAVENOUS
Status: DISCONTINUED | OUTPATIENT
Start: 2023-07-12 | End: 2023-07-12

## 2023-07-12 RX ORDER — OXYCODONE HYDROCHLORIDE 5 MG/1
10 TABLET ORAL EVERY 6 HOURS PRN
Status: DISCONTINUED | OUTPATIENT
Start: 2023-07-12 | End: 2023-07-12 | Stop reason: HOSPADM

## 2023-07-12 RX ORDER — METHOCARBAMOL 500 MG/1
1000 TABLET, FILM COATED ORAL 3 TIMES DAILY PRN
Status: DISCONTINUED | OUTPATIENT
Start: 2023-07-12 | End: 2023-07-12 | Stop reason: HOSPADM

## 2023-07-12 RX ORDER — METHYLPREDNISOLONE ACETATE 40 MG/ML
INJECTION, SUSPENSION INTRA-ARTICULAR; INTRALESIONAL; INTRAMUSCULAR; SOFT TISSUE
Status: DISCONTINUED | OUTPATIENT
Start: 2023-07-12 | End: 2023-07-12

## 2023-07-12 RX ORDER — ROCURONIUM BROMIDE 10 MG/ML
INJECTION, SOLUTION INTRAVENOUS
Status: DISCONTINUED | OUTPATIENT
Start: 2023-07-12 | End: 2023-07-12

## 2023-07-12 RX ORDER — ONDANSETRON 2 MG/ML
4 INJECTION INTRAMUSCULAR; INTRAVENOUS DAILY PRN
Status: DISCONTINUED | OUTPATIENT
Start: 2023-07-12 | End: 2023-07-12 | Stop reason: HOSPADM

## 2023-07-12 RX ORDER — SODIUM CHLORIDE 0.9 % (FLUSH) 0.9 %
10 SYRINGE (ML) INJECTION
Status: DISCONTINUED | OUTPATIENT
Start: 2023-07-12 | End: 2023-07-12 | Stop reason: HOSPADM

## 2023-07-12 RX ORDER — OXYCODONE HYDROCHLORIDE 5 MG/1
5 TABLET ORAL
Status: DISCONTINUED | OUTPATIENT
Start: 2023-07-12 | End: 2023-07-12 | Stop reason: HOSPADM

## 2023-07-12 RX ORDER — DEXAMETHASONE SODIUM PHOSPHATE 4 MG/ML
INJECTION, SOLUTION INTRA-ARTICULAR; INTRALESIONAL; INTRAMUSCULAR; INTRAVENOUS; SOFT TISSUE
Status: DISCONTINUED | OUTPATIENT
Start: 2023-07-12 | End: 2023-07-12

## 2023-07-12 RX ORDER — LIDOCAINE HYDROCHLORIDE 20 MG/ML
INJECTION INTRAVENOUS
Status: DISCONTINUED | OUTPATIENT
Start: 2023-07-12 | End: 2023-07-12

## 2023-07-12 RX ORDER — PROPOFOL 10 MG/ML
VIAL (ML) INTRAVENOUS
Status: DISCONTINUED | OUTPATIENT
Start: 2023-07-12 | End: 2023-07-12

## 2023-07-12 RX ORDER — CARBOXYMETHYLCELLULOSE SODIUM 10 MG/ML
GEL OPHTHALMIC
Status: DISCONTINUED | OUTPATIENT
Start: 2023-07-12 | End: 2023-07-12

## 2023-07-12 RX ORDER — FAMOTIDINE 10 MG/ML
INJECTION INTRAVENOUS
Status: DISCONTINUED | OUTPATIENT
Start: 2023-07-12 | End: 2023-07-12

## 2023-07-12 RX ORDER — VANCOMYCIN HYDROCHLORIDE 1 G/20ML
INJECTION, POWDER, LYOPHILIZED, FOR SOLUTION INTRAVENOUS
Status: DISCONTINUED | OUTPATIENT
Start: 2023-07-12 | End: 2023-07-12

## 2023-07-12 RX ORDER — ACETAMINOPHEN 500 MG
1000 TABLET ORAL ONCE
Status: COMPLETED | OUTPATIENT
Start: 2023-07-12 | End: 2023-07-12

## 2023-07-12 RX ORDER — ACETAMINOPHEN 325 MG/1
650 TABLET ORAL EVERY 6 HOURS PRN
Status: DISCONTINUED | OUTPATIENT
Start: 2023-07-12 | End: 2023-07-12 | Stop reason: HOSPADM

## 2023-07-12 RX ORDER — ONDANSETRON 2 MG/ML
INJECTION INTRAMUSCULAR; INTRAVENOUS
Status: DISCONTINUED | OUTPATIENT
Start: 2023-07-12 | End: 2023-07-12

## 2023-07-12 RX ORDER — HYDROMORPHONE HYDROCHLORIDE 1 MG/ML
0.2 INJECTION, SOLUTION INTRAMUSCULAR; INTRAVENOUS; SUBCUTANEOUS EVERY 5 MIN PRN
Status: COMPLETED | OUTPATIENT
Start: 2023-07-12 | End: 2023-07-12

## 2023-07-12 RX ORDER — PHENYLEPHRINE HYDROCHLORIDE 10 MG/ML
INJECTION INTRAVENOUS
Status: DISCONTINUED | OUTPATIENT
Start: 2023-07-12 | End: 2023-07-12

## 2023-07-12 RX ORDER — FENTANYL CITRATE 50 UG/ML
INJECTION, SOLUTION INTRAMUSCULAR; INTRAVENOUS
Status: DISCONTINUED | OUTPATIENT
Start: 2023-07-12 | End: 2023-07-12

## 2023-07-12 RX ORDER — PREGABALIN 50 MG/1
100 CAPSULE ORAL 2 TIMES DAILY
Status: DISCONTINUED | OUTPATIENT
Start: 2023-07-12 | End: 2023-07-12 | Stop reason: HOSPADM

## 2023-07-12 RX ORDER — KETAMINE HCL IN 0.9 % NACL 50 MG/5 ML
SYRINGE (ML) INTRAVENOUS
Status: DISCONTINUED | OUTPATIENT
Start: 2023-07-12 | End: 2023-07-12

## 2023-07-12 RX ADMIN — DEXAMETHASONE SODIUM PHOSPHATE 8 MG: 4 INJECTION, SOLUTION INTRAMUSCULAR; INTRAVENOUS at 07:07

## 2023-07-12 RX ADMIN — HYDROMORPHONE HYDROCHLORIDE 0.2 MG: 1 INJECTION, SOLUTION INTRAMUSCULAR; INTRAVENOUS; SUBCUTANEOUS at 10:07

## 2023-07-12 RX ADMIN — ROCURONIUM BROMIDE 50 MG: 10 INJECTION INTRAVENOUS at 07:07

## 2023-07-12 RX ADMIN — FENTANYL CITRATE 100 MCG: 50 INJECTION, SOLUTION INTRAMUSCULAR; INTRAVENOUS at 07:07

## 2023-07-12 RX ADMIN — GLYCOPYRROLATE 0.6 MG: 0.2 INJECTION, SOLUTION INTRAMUSCULAR; INTRAVENOUS at 09:07

## 2023-07-12 RX ADMIN — CEFAZOLIN 2 G: 2 INJECTION, POWDER, FOR SOLUTION INTRAMUSCULAR; INTRAVENOUS at 07:07

## 2023-07-12 RX ADMIN — PROPOFOL 150 MG: 10 INJECTION, EMULSION INTRAVENOUS at 07:07

## 2023-07-12 RX ADMIN — ACETAMINOPHEN 1000 MG: 500 TABLET ORAL at 06:07

## 2023-07-12 RX ADMIN — MIDAZOLAM HYDROCHLORIDE 2 MG: 1 INJECTION, SOLUTION INTRAMUSCULAR; INTRAVENOUS at 07:07

## 2023-07-12 RX ADMIN — PHENYLEPHRINE HYDROCHLORIDE 100 MCG: 10 INJECTION INTRAVENOUS at 08:07

## 2023-07-12 RX ADMIN — OXYCODONE HYDROCHLORIDE 10 MG: 5 TABLET ORAL at 10:07

## 2023-07-12 RX ADMIN — PHENYLEPHRINE HYDROCHLORIDE 100 MCG: 10 INJECTION INTRAVENOUS at 09:07

## 2023-07-12 RX ADMIN — EPHEDRINE SULFATE 10 MG: 50 INJECTION INTRAVENOUS at 08:07

## 2023-07-12 RX ADMIN — SODIUM CHLORIDE: 0.9 INJECTION, SOLUTION INTRAVENOUS at 06:07

## 2023-07-12 RX ADMIN — LIDOCAINE HYDROCHLORIDE 100 MG: 20 INJECTION INTRAVENOUS at 07:07

## 2023-07-12 RX ADMIN — ONDANSETRON 4 MG: 2 INJECTION, SOLUTION INTRAMUSCULAR; INTRAVENOUS at 09:07

## 2023-07-12 RX ADMIN — SODIUM CHLORIDE, SODIUM GLUCONATE, SODIUM ACETATE, POTASSIUM CHLORIDE, MAGNESIUM CHLORIDE, SODIUM PHOSPHATE, DIBASIC, AND POTASSIUM PHOSPHATE: .53; .5; .37; .037; .03; .012; .00082 INJECTION, SOLUTION INTRAVENOUS at 09:07

## 2023-07-12 RX ADMIN — PREGABALIN 100 MG: 50 CAPSULE ORAL at 10:07

## 2023-07-12 RX ADMIN — FAMOTIDINE 20 MG: 10 INJECTION, SOLUTION INTRAVENOUS at 07:07

## 2023-07-12 RX ADMIN — NEOSTIGMINE METHYLSULFATE 5 MG: 0.5 INJECTION INTRAVENOUS at 09:07

## 2023-07-12 RX ADMIN — CARBOXYMETHYLCELLULOSE SODIUM 4 DROP: 10 GEL OPHTHALMIC at 07:07

## 2023-07-12 RX ADMIN — Medication 30 MG: at 07:07

## 2023-07-12 RX ADMIN — SODIUM CHLORIDE, SODIUM GLUCONATE, SODIUM ACETATE, POTASSIUM CHLORIDE, MAGNESIUM CHLORIDE, SODIUM PHOSPHATE, DIBASIC, AND POTASSIUM PHOSPHATE: .53; .5; .37; .037; .03; .012; .00082 INJECTION, SOLUTION INTRAVENOUS at 08:07

## 2023-07-12 NOTE — ADDENDUM NOTE
Addendum  created 07/12/23 1034 by Rebeka Lacy, CRNA    Child order released for a procedure order, Clinical Note Signed, Intraprocedure Blocks edited, LDA created via procedure documentation, SmartForm saved

## 2023-07-12 NOTE — ANESTHESIA PREPROCEDURE EVALUATION
07/12/2023  Pre-operative evaluation for Procedure(s) (LRB):  LAMINECTOMY, SPINE, LUMBAR, WITH DISCECTOMY L4-5  JENNIFFER FRAME GUSTAVO RETRACTORS SNS: SSEP/EMG (N/A)    Brice Byrd is a 57 y.o. 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     Patient Active Problem List   Diagnosis    Transaminitis    Syncope and collapse    Primary hypertension    SVT (supraventricular tachycardia)    Mixed hyperlipidemia    Status post catheter ablation of slow pathway    Attention deficit hyperactivity disorder (ADHD), combined type    Open angle with borderline findings, low risk, bilateral    Fatty liver    Mallet finger of right hand    PTSD (post-traumatic stress disorder)    Generalized anxiety disorder    Agatston CAC score, >400       Review of patient's allergies indicates:   Allergen Reactions    Statins-hmg-coa reductase inhibitors Other (See Comments)     nausea, HA, diarrhea, tiredness       Current Facility-Administered Medications on File Prior to Encounter   Medication Dose Route Frequency Provider Last Rate Last Admin    sodium chloride 0.9% bolus 1,000 mL  1,000 mL Intravenous Once Li Stephen NP         Current Outpatient Medications on File Prior to Encounter   Medication Sig Dispense Refill    chlorthalidone (HYGROTEN) 25 MG Tab Take 1 tablet (25 mg total) by mouth once daily. 90 tablet 3    dextroamphetamine-amphetamine (ADDERALL XR) 30 MG 24 hr capsule Take 1 capsule (30 mg total) by mouth every morning. 30 capsule 0    telmisartan (MICARDIS) 80 MG Tab Take 1 tablet (80 mg total) by mouth once daily. 90 tablet 3    dextroamphetamine-amphetamine (ADDERALL XR) 10 MG 24 hr capsule 1 capsule every afternoon 30 capsule 0    [START ON 7/16/2023] dextroamphetamine-amphetamine (ADDERALL XR) 10 MG 24 hr capsule 1 capsule every afternoon 30 capsule 0     [START ON 8/15/2023] dextroamphetamine-amphetamine (ADDERALL XR) 10 MG 24 hr capsule 1 capsule every afternoon 30 capsule 0    [START ON 7/16/2023] dextroamphetamine-amphetamine (ADDERALL XR) 30 MG 24 hr capsule Take 1 capsule (30 mg total) by mouth every morning. 30 capsule 0    [START ON 8/15/2023] dextroamphetamine-amphetamine (ADDERALL XR) 30 MG 24 hr capsule Take 1 capsule (30 mg total) by mouth every morning. 30 capsule 0    sildenafiL (VIAGRA) 100 MG tablet Take 1 tablet (100 mg total) by mouth daily as needed for Erectile Dysfunction. 30 tablet 11       Past Surgical History:   Procedure Laterality Date    ABLATION N/A 2/18/2022    Procedure: Ablation;  Surgeon: Al Guerra MD;  Location: [facility] EP LAB;  Service: Cardiology;  Laterality: N/A;  SVT, RFA, JERONIMO, anes, MB, 3prep    EPIDURAL STEROID INJECTION Left 5/24/2023    Procedure: Left L4 + L5 Transforaminal Epidural Steroid Injections (ref: Roberson);  Surgeon: Al Batres Jr., MD;  Location: [facility] ENDO;  Service: Pain Management;  Laterality: Left;  @1330  No ATC or DM  Needs Consent    EPIDURAL STEROID INJECTION Left 6/14/2023    Procedure: Left L4 Transforaminal Epidural Steroid Injection;  Surgeon: Al Batres Jr., MD;  Location: [facility] ENDO;  Service: Pain Management;  Laterality: Left;  @1230  No ATC or DM  Needs COnsent    VASECTOMY         Social History     Socioeconomic History    Marital status:     Number of children: 3   Tobacco Use    Smoking status: Former    Smokeless tobacco: Never   Substance and Sexual Activity    Alcohol use: Never    Drug use: Never    Sexual activity: Yes     Partners: Female         CBC: No results for input(s): WBC, RBC, HGB, HCT, PLT, MCV, MCH, MCHC in the last 72 hours.    CMP: No results for input(s): NA, K, CL, CO2, BUN, CREATININE, GLU, MG, PHOS, CALCIUM, ALBUMIN, PROT, ALKPHOS, ALT, AST, BILITOT in the last 72 hours.    INR  No results for input(s): PT, INR,  PROTIME, APTT in the last 72 hours.        Diagnostic Studies:      [facility] Echo: 2021   The left ventricle is normal in size with normal systolic function. The estimated ejection fraction is 60%.   Normal right ventricular size with normal right ventricular systolic function.   Normal left ventricular diastolic function.   The estimated PA systolic pressure is 21 mmHg.   Normal central venous pressure (3 mmHg).         Pre-op Assessment    I have reviewed the Patient Summary Reports.     I have reviewed the Nursing Notes. I have reviewed the NPO Status.   I have reviewed the Medications.     Review of Systems  Cardiovascular:   Hypertension CAD          Physical Exam  General: Well nourished and Cooperative    Airway:  Mallampati: II   Mouth Opening: Normal  TM Distance: Normal  Tongue: Normal  Neck ROM: Normal ROM    Chest/Lungs:  Clear to auscultation, Normal Respiratory Rate    Heart:  Rate: Normal  Rhythm: Regular Rhythm  Sounds: Normal        Anesthesia Plan  Type of Anesthesia, risks & benefits discussed:    Anesthesia Type: Gen ETT  Intra-op Monitoring Plan: Standard ASA Monitors  Post Op Pain Control Plan: multimodal analgesia and IV/PO Opioids PRN  Induction:  IV  Airway Plan: Direct and Video, Post-Induction  Informed Consent: Informed consent signed with the Patient and all parties understand the risks and agree with anesthesia plan.  All questions answered.   ASA Score: 3    Ready For Surgery From Anesthesia Perspective.     .

## 2023-07-12 NOTE — BRIEF OP NOTE
[facility] - Surgery (Hospital)  Brief Operative Note    Surgery Date: 7/12/2023     Surgeon(s) and Role:     * Marko Campbell MD - Primary    Assisting Surgeon: None    Pre-op Diagnosis:  Lumbar disc herniation with radiculopathy [M51.16]    Post-op Diagnosis:  Post-Op Diagnosis Codes:     * Lumbar disc herniation with radiculopathy [M51.16]    Procedure(s) (LRB):  LAMINECTOMY, SPINE, LUMBAR, WITH DISCECTOMY L4-5  JENNIFFER FRAME GUSTAVO RETRACTORS SNS: SSEP/EMG (N/A)    Anesthesia: General    Estimated Blood Loss: * No values recorded between 7/12/2023  8:05 AM and 7/12/2023  9:31 AM *         Specimens:   Specimen (24h ago, onward)      None              Discharge Note    OUTCOME: Patient tolerated treatment/procedure well without complication and is now ready for discharge.    DISPOSITION: Home or Self Care    FINAL DIAGNOSIS:  <principal problem not specified>    FOLLOWUP: In clinic    DISCHARGE INSTRUCTIONS:  No discharge procedures on file.

## 2023-07-12 NOTE — TRANSFER OF CARE
"Anesthesia Transfer of Care Note    Patient: Brice Byrd    Procedure(s) Performed: Procedure(s) (LRB):  LAMINECTOMY, SPINE, LUMBAR, WITH DISCECTOMY L4-5  JENNIFFER FRAME GUSTAVO RETRACTORS SNS: SSEP/EMG (N/A)    Patient location: PACU    Anesthesia Type: general    Transport from OR: Transported from OR on 6-10 L/min O2 by face mask with adequate spontaneous ventilation    Post pain: adequate analgesia    Post assessment: no apparent anesthetic complications and tolerated procedure well    Post vital signs: stable    Level of consciousness: sedated    Nausea/Vomiting: no nausea/vomiting    Complications: none    Transfer of care protocol was followed      Last vitals:   Visit Vitals  /67   Pulse 63   Temp 36.5 °C (97.7 °F) (Oral)   Resp 18   Ht 5' 9" (1.753 m)   Wt 83 kg (183 lb)   SpO2 100%   BMI 27.02 kg/m²     "

## 2023-07-12 NOTE — ANESTHESIA PROCEDURE NOTES
Intubation    Date/Time: 7/12/2023 7:41 AM  Performed by: Rebeka Lacy CRNA  Authorized by: Terrence Yousif MD     Intubation:     Induction:  Intravenous    Intubated:  Postinduction    Mask Ventilation:  Easy with oral airway    Attempts:  1    Attempted By:  CRNA    Method of Intubation:  Video laryngoscopy    Blade:  Saucedo 3    Laryngeal View Grade: Grade I - full view of cords      Difficult Airway Encountered?: No      Complications:  None    Airway Device:  Oral endotracheal tube    Airway Device Size:  7.5    Style/Cuff Inflation:  Cuffed (inflated to minimal occlusive pressure)    Inflation Amount (mL):  6    Tube secured:  22    Secured at:  The lips    Placement Verified By:  Capnometry    Complicating Factors:  None    Findings Post-Intubation:  BS equal bilateral and atraumatic/condition of teeth unchanged

## 2023-07-12 NOTE — PATIENT INSTRUCTIONS
DR. OCTAVIA CORTES'S POSTOPERATIVE INSTRUCTIONS -   LUMBAR DISKECTOMY       Antibiotics: You do not need additional antibiotics at home.    Wound Care: You may remove your dressing and shower 5 days after surgery. Until then please keep your wound clean and dry. Sponge baths are acceptable. Do not go in a pool or hot tub until seen in clinic. Please leave the small steri-strips covering your wound in place until they fall off naturally (2 weeks). You may notice clear suture ends hanging from the sides of your incision after the steri-strips are removed, it is ok to clip these with scissors.    Brace: You do not need a brace.    Pain: We will use a multimodal approach for pain management after your surgery.  You will be given a prescription for pain medicine when you are discharged from the hospital.  You will also be given prescriptions for Robaxin (a muscle relaxer), Gabapentin, Celebrex and Tylenol.  Please note: you will only be given ONE prescription for narcotics when you are discharged from the hospital.  This medication is for breakthrough pain only. This medication will not be refilled.  The other medications given to you may be refilled if needed.      Infection: Signs of infection include increasing wound drainage and redness around the wound, as well as a temperature over 101.5 degrees. It is unnecessary to take your temperature on a routine basis. Please call the above number if you are concerned about an infection.    Driving and Work: It is ok to return to driving and work as long as you are not taking narcotic pain medications. Please do not lift over 10 pounds or participate in exercise or sports until cleared by Dr. Cortes.    Deep Venous Thrombosis (Blood Clots): Symptoms include swelling in the legs and shortness of breath. Please call the office or proceed to the nearest emergency room if you have any of these symptoms.    Physical Therapy: The best physical therapy after surgery is walking. Please try  to walk as much as possible.    Follow-up: You will be scheduled for a follow-up appointment in 4 weeks with either Dr. Campbell or his physician assistant, Toya Roberson PA-C.    Questions: During business hours please call [phone] for routine questions. For after hours questions please call [phone] and ask to speak with the Orthopaedic resident on call.    Disability: If you submitted short term disability paperwork for us to complete and would like to check the status, please call the Disability Department at [phone].  You may fax any necessary paperwork to [phone].

## 2023-07-12 NOTE — PLAN OF CARE
VSS.  Patient tolerating oral liquids without difficulty.   No apparent s&s of distress noted at this time, no complaints voiced at this time.   Discharge instructions reviewed with patient and spouse with good verbal feedback received.   Post op medications delivered to bedside.  Patient ready for discharge.

## 2023-07-12 NOTE — ANESTHESIA POSTPROCEDURE EVALUATION
Anesthesia Post Evaluation    Patient: Brice Byrd    Procedure(s) Performed: Procedure(s) (LRB):  LAMINECTOMY, SPINE, LUMBAR, WITH DISCECTOMY L4-5  JENNIFFER FRAME GUSTAVO RETRACTORS SNS: SSEP/EMG (N/A)    Final Anesthesia Type: general      Patient location during evaluation: PACU  Patient participation: Yes- Able to Participate  Level of consciousness: awake and alert  Post-procedure vital signs: reviewed and stable  Pain management: adequate  Airway patency: patent  GISSELLE mitigation strategies: Multimodal analgesia  PONV status at discharge: No PONV  Anesthetic complications: no      Cardiovascular status: blood pressure returned to baseline and hemodynamically stable  Respiratory status: unassisted  Hydration status: euvolemic  Follow-up not needed.          Vitals Value Taken Time   BP 93/59 07/12/23 1001   Temp 36.3 °C (97.3 °F) 07/12/23 0935   Pulse 63 07/12/23 1013   Resp 15 07/12/23 1013   SpO2 99 % 07/12/23 1013   Vitals shown include unvalidated device data.      No case tracking events are documented in the log.      Pain/Linh Score: Pain Rating Prior to Med Admin: 9 (7/12/2023 10:00 AM)

## 2023-07-12 NOTE — OPERATIVE NOTE ADDENDUM
Certification of Assistant at Surgery       Surgery Date: 7/12/2023     Participating Surgeons:  Surgeon(s) and Role:     * Marko Campbell MD - Primary    Procedures:  Procedure(s) (LRB):  LAMINECTOMY, SPINE, LUMBAR, WITH DISCECTOMY L4-5  JENNIFFER FRAME GUSTAVO RETRACTORS SNS: SSEP/EMG (N/A)    Assistant Surgeon's Certification of Necessity:  I understand that section 1842 (b) (6) (d) of the Social Security Act generally prohibits Medicare Part B reasonable charge payment for the services of assistants at surgery in teaching hospitals when qualified residents are available to furnish such services. I certify that the services for which payment is claimed were medically necessary, and that no qualified resident was available to perform the services. I further understand that these services are subject to post-payment review by the Medicare carrier.      Toya Roberson PA-C    07/12/2023  12:07 PM

## 2023-07-12 NOTE — OP NOTE
DATE OF PROCEDURE: 7/12/2023.     SURGEON: Marko Campbell M.D.     FIRST ASSISTANT: Toya Roberson PA-C, no resident was available.     PREOPERATIVE DIAGNOSIS: L4-5 Lumbar Disc Herniation     POSTOPERATIVE DIAGNOSIS: Same     PROCEDURES PERFORMED:  1. Lumbar laminotomy, foraminotomy and disectomy L4-5     ANESTHESIA: General endotracheal anesthesia.     ESTIMATED BLOOD LOSS: 25 mL.     SPECIMENS: None.     FINDINGS: L L4-5 HNP with inferior migration.     DRAINS: None.     COMPLICATIONS: None.     SPONGE AND NEEDLE COUNT: Correct x2.     NEUROLOGICAL MONITORING: Somatosensory evoked  potential, free-running EMG.  There were no changes to SSEP baselines.  There no significant EMG activity.     REASON FOR OPERATION AND BRIEF HISTORY AND PHYSICAL: Brice Byrd is a 57 y.o. male with refractory lumbar L4-5 disc herniation and LLE radiculopathy. The patient has failed conservative management and is here for surgery today.     DESCRIPTION OF INFORMED CONSENT: Please see my last clinic note for a full description of the informed consent I had with the patient.     DESCRIPTION OF PROCEDURE: The patient was met in the preoperative area where her low back was marked in the midline by me personally. Subsequently, he was brought to the Operating Room where sequential compression devices were placed on the patient's bilateral calves and run throughout the case. The patient was then intubated via general endotracheal anesthesia. They were then flipped prone onto a Robert table with Won frame. The head was secured on a facial pillow. The eyes were personally checked by nola found to be acceptable. The arms were held in a 90-90 position. All bony prominences were padded paying special attention to the axilla, elbows, hands, knees, and feet. Being satisfied with positioning and confirming this to be adequate with Anesthesia, the patient's lower back was prepped and draped in normal sterile fashion.     A full timeout  was then called identifying the patient, the procedural site and level, the availability of all instruments and no specific nursing, surgical, anesthetic or neurological monitoring concerns. Finding that it was safe to proceed with surgery, the patient was given a weight-appropriate dose of antibiotics by the Anesthesia Service.     I made a midline lumbar incision and carried dissection down through the skin and subcutaneous tissues using combination of sharp dissection and electrocautery where necessary. The dorsal fascia of the lumbar spine was identified and incised in the midline and I performed a preliminary subperiosteal exposure of what I took to be the L4 lamina as well as what I took to be the L4-5 facet joint. At the conclusion of my preliminary dissection, I placed a Kocher clamp on the L4 spinous process and a Penfield 4 elevator under what I took to be the trailing edge of the L4 lamina, took lateral radiograph and thus confirmed my level.     At this point, I finalized my exposure. I then used a high-speed drill to thin the trailing one-third of the left lamina to reveal the origin of the ligamentum flavum. I then released the ligamentum flavum from the trailing edge of the left L4 lamina using a forward-angle curette. I gently worked the ligamentum flavum distally. This allowed me to visualize the underlying epidural fat and subsequently blue-white dura. I used the Penfield 4 elevator to move the thecal sac towards the midline and brought in a nerve root retractor. An obvious disk bulge was then seen on the left side and I entered it with a disc knife via a vertical annulotomy. Multiple soft fragments were removed with a pituitary. I then extended the laminotomy down towards top of left L5. I removed a few extruded disc fragments that migrated inferiorly past the L L5 pedicle. At the conclusion of my diskectomy, I could easily pass a Patterson elevator along the L5 nerve root ventrally as well as  across the thecal sac. The wound was then thoroughly irrigated, including irrigation of the disk space, which revealed no residual free fragments. I then finalized hemostasis with FloSeal and patties. Valsalva maneuver to 40 mmHg demonstrated no cerebrospinal fluid leak. I put 1mL of DepoMedrol on top of the dura and nerve. Next, 500 mg of vancomycin powder was placed in the deep space and deep fascia was closed with #1 Vicryl in a figure-of-eight fashion. The superficial layer was then irrigated and closed with 2-0 Vicryl, 3-0 Stratafix, Dermabond and Steri-Strips. A soft dressing was placed. The patient was then flipped supine, extubated and transferred to the Recovery Room in stable condition.    Marko Campbell MD  Orthopaedic Spine Surgeon  Department of Orthopaedic Surgery  [phone]

## 2023-07-12 NOTE — PLAN OF CARE
Pre Op complete with no distress noted.  Wife at bedside and belongings will be locked in locker.

## 2023-07-17 ENCOUNTER — TELEPHONE (OUTPATIENT)
Dept: HEPATOLOGY | Facility: CLINIC | Age: 58
End: 2023-07-17
Payer: OTHER GOVERNMENT

## 2023-07-17 NOTE — TELEPHONE ENCOUNTER
----- Message from Apple Munson sent at 7/17/2023  1:16 PM CDT -----  Regarding: Appt  Contact: Pt [phone]        Current Appt date:   07/19/23       Type of Appt:  Fibroscan       Physician:  Neeru Luna.      Reason for rescheduling:    Just had spine surgery will not be able to make appt       Caller:  Brice       Contact Preference: [phone]

## 2023-07-18 ENCOUNTER — TELEPHONE (OUTPATIENT)
Dept: ORTHOPEDICS | Facility: CLINIC | Age: 58
End: 2023-07-18
Payer: OTHER GOVERNMENT

## 2023-07-18 NOTE — TELEPHONE ENCOUNTER
----- Message from Toya Roberson PA-C sent at 7/18/2023  9:09 AM CDT -----  Can you schedule a post op with me no xrays? Surgery was last week so appt in 3 weeks from now  ----- Message -----  From: Adam Jean MD  Sent: 7/18/2023   7:02 AM CDT  To: Toya Roberson PA-C    Hey this was a surgery from last week that still doesn't have f/u. Do they need to f/u with us?

## 2023-08-07 ENCOUNTER — OFFICE VISIT (OUTPATIENT)
Dept: PSYCHIATRY | Facility: CLINIC | Age: 58
End: 2023-08-07
Payer: OTHER GOVERNMENT

## 2023-08-07 DIAGNOSIS — F43.10 PTSD (POST-TRAUMATIC STRESS DISORDER): ICD-10-CM

## 2023-08-07 DIAGNOSIS — R45.4 IRRITABILITY AND ANGER: ICD-10-CM

## 2023-08-07 DIAGNOSIS — F41.1 GENERALIZED ANXIETY DISORDER: Primary | ICD-10-CM

## 2023-08-07 PROCEDURE — 90837 PR PSYCHOTHERAPY W/PATIENT, 60 MIN: ICD-10-PCS | Mod: ,,, | Performed by: SOCIAL WORKER

## 2023-08-07 PROCEDURE — 90837 PSYTX W PT 60 MINUTES: CPT | Mod: ,,, | Performed by: SOCIAL WORKER

## 2023-08-07 NOTE — PROGRESS NOTES
Individual Psychotherapy (PhD/LCSW)/Termination    8/7/2023    Site:  [facility]         Therapeutic Intervention: Met with patient.    Outpatient - Insight oriented psychotherapy 60 min - CPT code 29376, Outpatient - Behavior modifying psychotherapy 60 min - CPT code 50767, Outpatient - Supportive psychotherapy 60 min - CPT Code 83471, and Outpatient - Interactive psychotherapy 60 min - CPT code 88702      Chief complaint/reason for encounter: ptsd, anxiety     Interval History of present illness: Pt is a 58 yo male who presents today for f/u visit with LCSW. Pt initially established psychotherapy in order to address his dx of PTSD, ADHD, anxiety Pt reports mental health history hx of has participated in counseling/psychotherapy on an outpatient basis in the past and currently under psychiatric care. Pt currently prescribed Adderall. Pt is established with NICOLETTE Marks.     Pt presents in office. Last visit was May. Pt had back surgery in between. He continues to heal. Reviewed ongoing stressors. Engaged in active discussion and constructive processing. Assisted with dentification of thought processes and continued to review methods to promote self worth. Reviewed ways to help reduce and manage anxiety/stress, and depression through a mixture of CBT/DBT.  Continued to offer platform for support, validation, and re-framing.  Pt fully engaged.     Termination: Provider informed pt of upcoming departure in August from [facility]. Provide both internal and external resources for continuation of care options. Discussed that provider will be available to be contacted via [employer] portal until August 11th. Pt verbalized understanding      Treatment plan:  Target symptoms:  PTSD, anxiety  Why chosen therapy is appropriate versus another modality: relevant to diagnosis, patient responds to this modality, evidence based practice  Outcome monitoring methods: self-report, observation  Therapeutic  intervention type: insight oriented psychotherapy, behavior modifying psychotherapy, supportive psychotherapy, interactive psychotherapy    Risk parameters:  Patient reports no suicidal ideation  Patient reports no homicidal ideation  Patient reports no self-injurious behavior  Patient reports no violent behavior    Verbal deficits: None    Patient's response to intervention:  The patient's response to intervention is accepting.    Progress toward goals and other mental status changes:  The patient's progress toward goals is good.    Diagnosis:     ICD-10-CM ICD-9-CM   1. Generalized anxiety disorder  F41.1 300.02   2. Irritability and anger  R45.4 799.22   3. PTSD (post-traumatic stress disorder)  F43.10 309.81           Plan:  individual psychotherapy    Return to Clinic: as scheduled    Length of Service (minutes): 60

## 2023-08-08 NOTE — PROGRESS NOTES
Date: 08/08/2023    Supervising Physician: Marko Campbell M.D.    Date of Surgery: 7/12/23    Procedure: L4-5 discectomy    History: Brice Byrd is seen today for follow-up following the above listed procedure. Overall the patient is doing well but today notes his leg pain has significantly improved since surgery. He is only taking aleve.   he denies fever, chills, and sweats since the time of the surgery.     Exam: Post op dressing taken down.  Incision is healing well, clean, dry and intact.   There is no sign of infection. Neuro exam is stable. No signs of DVT.    Radiographs: n/a    Assessment/Plan: 4 weeks post op.    Doing well postoperatively.  reviewed.    I will plan to see the patient back for the next postop visit in 2 months.     Thank you for the opportunity to participate in this patient's care. Please give me a call if there are any concerns or questions.    Dr. Marko Campbell has personally examined the patient, answered all questions, and agreed with the above plan.

## 2023-08-10 ENCOUNTER — OFFICE VISIT (OUTPATIENT)
Dept: ORTHOPEDICS | Facility: CLINIC | Age: 58
End: 2023-08-10
Payer: OTHER GOVERNMENT

## 2023-08-10 VITALS — HEIGHT: 69 IN | WEIGHT: 185.06 LBS | BODY MASS INDEX: 27.41 KG/M2

## 2023-08-10 DIAGNOSIS — M51.16 LUMBAR DISC HERNIATION WITH RADICULOPATHY: Primary | ICD-10-CM

## 2023-08-10 PROCEDURE — 99213 OFFICE O/P EST LOW 20 MIN: CPT | Mod: PBBFAC | Performed by: ORTHOPAEDIC SURGERY

## 2023-08-10 PROCEDURE — 99999 PR PBB SHADOW E&M-EST. PATIENT-LVL III: CPT | Mod: PBBFAC,,, | Performed by: ORTHOPAEDIC SURGERY

## 2023-08-10 PROCEDURE — 99024 PR POST-OP FOLLOW-UP VISIT: ICD-10-PCS | Mod: ,,, | Performed by: ORTHOPAEDIC SURGERY

## 2023-08-10 PROCEDURE — 99999 PR PBB SHADOW E&M-EST. PATIENT-LVL III: ICD-10-PCS | Mod: PBBFAC,,, | Performed by: ORTHOPAEDIC SURGERY

## 2023-08-10 PROCEDURE — 99024 POSTOP FOLLOW-UP VISIT: CPT | Mod: ,,, | Performed by: ORTHOPAEDIC SURGERY

## 2023-08-31 ENCOUNTER — PROCEDURE VISIT (OUTPATIENT)
Dept: HEPATOLOGY | Facility: CLINIC | Age: 58
End: 2023-08-31
Payer: OTHER GOVERNMENT

## 2023-08-31 ENCOUNTER — OFFICE VISIT (OUTPATIENT)
Dept: HEPATOLOGY | Facility: CLINIC | Age: 58
End: 2023-08-31
Payer: OTHER GOVERNMENT

## 2023-08-31 VITALS — BODY MASS INDEX: 27.43 KG/M2 | WEIGHT: 185.19 LBS | HEIGHT: 69 IN

## 2023-08-31 DIAGNOSIS — I10 PRIMARY HYPERTENSION: ICD-10-CM

## 2023-08-31 DIAGNOSIS — K76.0 FATTY LIVER: ICD-10-CM

## 2023-08-31 DIAGNOSIS — E78.2 MIXED HYPERLIPIDEMIA: ICD-10-CM

## 2023-08-31 DIAGNOSIS — R74.01 TRANSAMINITIS: ICD-10-CM

## 2023-08-31 DIAGNOSIS — R74.01 TRANSAMINITIS: Primary | ICD-10-CM

## 2023-08-31 DIAGNOSIS — E66.3 OVERWEIGHT (BMI 25.0-29.9): ICD-10-CM

## 2023-08-31 PROCEDURE — 99999 PR PBB SHADOW E&M-EST. PATIENT-LVL III: CPT | Mod: PBBFAC,,, | Performed by: NURSE PRACTITIONER

## 2023-08-31 PROCEDURE — 99214 OFFICE O/P EST MOD 30 MIN: CPT | Mod: S$PBB,,, | Performed by: NURSE PRACTITIONER

## 2023-08-31 PROCEDURE — 99999 PR PBB SHADOW E&M-EST. PATIENT-LVL III: ICD-10-PCS | Mod: PBBFAC,,, | Performed by: NURSE PRACTITIONER

## 2023-08-31 PROCEDURE — 91200 LIVER ELASTOGRAPHY: CPT | Mod: PBBFAC | Performed by: NURSE PRACTITIONER

## 2023-08-31 PROCEDURE — 99213 OFFICE O/P EST LOW 20 MIN: CPT | Mod: PBBFAC | Performed by: NURSE PRACTITIONER

## 2023-08-31 PROCEDURE — 76981 FIBROSCAN (VIBRATION CONTROLLED TRANSIENT ELASTOGRAPHY): ICD-10-PCS | Mod: 26,S$PBB,, | Performed by: NURSE PRACTITIONER

## 2023-08-31 PROCEDURE — 99214 PR OFFICE/OUTPT VISIT, EST, LEVL IV, 30-39 MIN: ICD-10-PCS | Mod: S$PBB,,, | Performed by: NURSE PRACTITIONER

## 2023-08-31 PROCEDURE — 76981 USE PARENCHYMA: CPT | Mod: 26,S$PBB,, | Performed by: NURSE PRACTITIONER

## 2023-08-31 NOTE — PATIENT INSTRUCTIONS
1. Fibroscan to look for fat or scar tissue in the liver showed >67% fat in the liver, no scar tissue damage   2.  Follow up in 1 year with labs a few days before, fibroscan same day     There is no FDA approved therapy for fatty liver disease. Therefore, these things are important:  Limit alcohol consumption  2 Weight loss goal of 5-10 lbs, referral for [facility] if interested. Also, if interested in a dietician visit to create a weight loss plan, contact the dietician team at [facility] at [email or web address] to schedule a visit to you can call [facility] in [place]: [phone] and the  will transfer the call to one of the dieticians to schedule an appointment. Or you can also call [phone] to schedule. They do offer video visits   3. Low carb/sugar, high fiber and protein diet.Try to limit your carb intake to LESS than 30-45 grams of carbs with a meal or LESS than 5-10 grams with any snack (total of any snack foods eaten during that time). Use [employer] or Lose It daniel to add up your carbs through the day. Do NOT drink any beverages with calories or carbs (this can lead to high blood sugar and weight gain). Also, some of our patients have been very successful with weight loss using the pre made/planned meal planning services that limit calories and portion size (one company in [place] is called [employer] but there are also many others out there. The main thing to look for is low calorie, high protein, low carb)  4. Exercise, 5 days per week, 30 minutes per day, as tolerated  5. Recommend good cholesterol, blood pressure, blood sugar levels       In some people, fatty liver can progress to steatohepatitis (inflamatory fatty liver) and possibly to cirrhosis, increasing the risk for liver cancer, liver failure, and death. Therefore, the lifestyle changes are very important to decrease this risk.     Website with information about fatty liver and  inflammation related to fatty liver (SEGURA) = www.nashtruth.com  AND [email or web address]

## 2023-08-31 NOTE — PROCEDURES
FibroScan (Vibration Controlled Transient Elastography)    Date/Time: 8/31/2023 9:00 AM    Performed by: Neeru Luna NP  Authorized by: Neeru Luna, MATT    Diagnosis:  NAFLD    Probe:  M    Universal Protocol: Patient's identity, procedure and site were verified, confirmatory pause was performed.  Discussed procedure including risks and potential complications.  Questions answered.  Patient verbalizes understanding and wishes to proceed with VCTE.     Procedure: After providing explanations of the procedure, patient was placed in the supine position with right arm in maximum abduction to allow optimal exposure of right lateral abdomen.  Patient was briefly assessed, Testing was performed in the mid-axillary location, 50Hz Shear Wave pulses were applied and the resulting Shear Wave and Propagation Speed detected with a 3.5 MHz ultrasonic signal, using the FibroScan probe, Skin to liver capsule distance and liver parenchyma were accessed during the entire examination with the FibroScan probe, Patient was instructed to breathe normally and to abstain from sudden movements during the procedure, allowing for random measurements of liver stiffness. At least 10 Shear Waves were produced, Individual measurements of each Shear Wave were calculated.  Patient tolerated the procedure well with no complications.  Meets discharge criteria as was dismissed.  Rates pain 0 out of 10.  Patient will follow up with ordering provider to review results.    Findings  Median liver stiffness score:  5.6  CAP Reading: dB/m:  350    IQR/med %:  16  Interpretation  Fibrosis interpretation is based on medial liver stiffness - Kilopascal (kPa).    Fibrosis Stage:  F 0-1  Steatosis interpretation is based on controlled attenuation parameter - (dB/m).    Steatosis Grade:  S3

## 2023-08-31 NOTE — PROGRESS NOTES
[facility] VISIT FOLLOW UP NOTE    PCP: Troy Heart MD     CHIEF COMPLAINT: elevated liver enzymes, fatty liver     HPI: This is a 58 y.o.      White male with PMH noted below, presenting for follow up of fatty liver     Serological workup was negative for Won's, alpha-1 antitrypsin deficiency, hemochromatosis, autoimmune etiology, and viral hepatitis.   PETH negative    Prior serologic workup:   Lab Results   Component Value Date    SMOOTHMUSCAB Negative 1:40 06/30/2022    AMAIFA Negative 1:40 06/30/2022    IGGSERUM 1030 06/30/2022    ANASCREEN Negative <1:80 06/30/2022    FERRITIN 179 06/30/2022    FESATURATED 21 06/30/2022    CERULOPLSM 29.0 06/30/2022    HEPBSAG Negative 06/30/2022    HEPCAB Negative 06/30/2022    HEPAIGM Negative 06/30/2022     Liver fibrosis staging:  -- fibroscan 7/2022 noted F0, S3 (kPA 6.3, )  -- f ibroscan 8/2023 noted F0, S3 (kPA 5.6, )    Risk factors for fatty liver include overweight, HTN, HLD    Interval HPI: Presents today alone. No Herbal medications, No New medications or med changes. Does not drink alcohol   Stopped Creatine and pre workout >1 year ago  No DILI risk with micardis or chlorithalidone   Taking NSAIDs chronically but does not exceed max daily dose       Labs done 6/2023 show elevated transaminase levels (ALT > AST, elevated since at least 11/2021)  Platelets WNL, alk phos WNL  Synthetic liver functioning WNL    Lab Results   Component Value Date     (H) 06/06/2023    AST 36 06/06/2023    ALKPHOS 99 06/06/2023    BILITOT 0.7 06/06/2023    ALBUMIN 4.0 06/06/2023    INR 0.9 06/06/2023     06/06/2023     Abd U/S done 7/2022 noted hepatomegaly, fatty liver - can repeat in 2025     Denies family history of liver disease . Denies alcohol consumption currently or in the past    + Immunity to Hep A and B        Allergy and medication list reviewed and updated     PMHX:  has a past medical history of ADHD (attention  "deficit hyperactivity disorder), Anxiety, psychiatric care, Hypertension, Psychiatric problem, PTSD (post-traumatic stress disorder), SVT (supraventricular tachycardia), and Therapy.    PSHX:  has a past surgical history that includes Vasectomy; Ablation (N/A, 2/18/2022); Epidural steroid injection (Left, 5/24/2023); Epidural steroid injection (Left, 6/14/2023); and Lumbar laminectomy with discectomy (N/A, 7/12/2023).    FAMILY HISTORY: Updated and reviewed in [facility]    SOCIAL HISTORY:   Social History     Substance and Sexual Activity   Alcohol Use Never       Social History     Substance and Sexual Activity   Drug Use Never       ROS:   GENERAL: Denies fatigue  CARDIOVASCULAR: Denies edema  GI: Denies abdominal pain  SKIN: Denies rash, itching   NEURO: Denies confusion, memory loss, or mood changes    PHYSICAL EXAM:   Friendly male, in no acute distress; alert and oriented to person, place and time  VITALS: Ht 5' 9" (1.753 m)   Wt 84 kg (185 lb 3 oz)   BMI 27.35 kg/m²   EYES: Sclerae anicteric  GI: Soft, non-tender, non-distended. No ascites.  EXTREMITIES:  No edema.  SKIN: Warm and dry. No jaundice. No telangectasias noted. No palmar erythema.  NEURO:  No asterixis.  PSYCH:  Thought and speech pattern appropriate. Behavior normal      EDUCATION:  See instructions discussed with patient in Instructions section of the After Visit Summary     ASSESSMENT & PLAN:  58 y.o.      White male with:  1. Fatty liver, likely related to metabolic risk factors   - see HPI  -- Fibroscan 8/2023 noted F0, S3 - will repeat yearly   -- Recommendations discussed with patient:  Limit alcohol consumption  2 Weight loss goal of 5-10 lbs  3. Low carb/sugar, high fiber and protein diet, limit your carb intake to LESS than 30-45 grams of carbs with a meal or LESS than 5-10 grams with any snack   4. Exercise, 5 days per week, 30 minutes per day, as tolerated  5. Recommend good cholesterol, blood pressure, blood sugar levels     2. " Elevated liver enzymes  -- significant elevation not charactistic for fatty liver given limited metabolic risk factors but sero w/u has been negative    3. Overweight, HTN, HLD  -- Body mass index is 27.35 kg/m².   -- increases risk for fatty liver        Follow up in about 1 year (around 8/31/2024). with labs and fibroscan before  Orders Placed This Encounter   Procedures    FibroScan [place] (Vibration Controlled Transient Elastography)    Hepatic Function Panel        Thank you for allowing me to participate in the care of CORRIE Novoa    I spent a total of 30 minutes on the day of the visit.This includes face to face time and non-face to face time preparing to see the patient (eg, review of tests), obtaining and/or reviewing separately obtained history, documenting clinical information in the electronic or other health record, independently interpreting results and communicating results to the patient/family/caregiver, and coordinating care.         CC'ed note to:

## 2023-09-13 ENCOUNTER — OFFICE VISIT (OUTPATIENT)
Dept: INTERNAL MEDICINE | Facility: CLINIC | Age: 58
End: 2023-09-13
Payer: OTHER GOVERNMENT

## 2023-09-13 VITALS
WEIGHT: 185 LBS | HEIGHT: 69 IN | HEART RATE: 100 BPM | DIASTOLIC BLOOD PRESSURE: 80 MMHG | BODY MASS INDEX: 27.4 KG/M2 | SYSTOLIC BLOOD PRESSURE: 122 MMHG | OXYGEN SATURATION: 98 %

## 2023-09-13 DIAGNOSIS — F90.2 ATTENTION DEFICIT HYPERACTIVITY DISORDER (ADHD), COMBINED TYPE: ICD-10-CM

## 2023-09-13 PROCEDURE — 99213 PR OFFICE/OUTPT VISIT, EST, LEVL III, 20-29 MIN: ICD-10-PCS | Mod: S$PBB,,, | Performed by: FAMILY MEDICINE

## 2023-09-13 PROCEDURE — 99213 OFFICE O/P EST LOW 20 MIN: CPT | Mod: PBBFAC | Performed by: FAMILY MEDICINE

## 2023-09-13 PROCEDURE — 99999 PR PBB SHADOW E&M-EST. PATIENT-LVL III: ICD-10-PCS | Mod: PBBFAC,,, | Performed by: FAMILY MEDICINE

## 2023-09-13 PROCEDURE — 99213 OFFICE O/P EST LOW 20 MIN: CPT | Mod: S$PBB,,, | Performed by: FAMILY MEDICINE

## 2023-09-13 PROCEDURE — 99999 PR PBB SHADOW E&M-EST. PATIENT-LVL III: CPT | Mod: PBBFAC,,, | Performed by: FAMILY MEDICINE

## 2023-09-13 RX ORDER — DEXTROAMPHETAMINE SACCHARATE, AMPHETAMINE ASPARTATE MONOHYDRATE, DEXTROAMPHETAMINE SULFATE AND AMPHETAMINE SULFATE 2.5; 2.5; 2.5; 2.5 MG/1; MG/1; MG/1; MG/1
CAPSULE, EXTENDED RELEASE ORAL
Qty: 30 CAPSULE | Refills: 0 | Status: SHIPPED | OUTPATIENT
Start: 2023-10-13 | End: 2023-12-11 | Stop reason: SDUPTHER

## 2023-09-13 RX ORDER — DEXTROAMPHETAMINE SACCHARATE, AMPHETAMINE ASPARTATE MONOHYDRATE, DEXTROAMPHETAMINE SULFATE AND AMPHETAMINE SULFATE 7.5; 7.5; 7.5; 7.5 MG/1; MG/1; MG/1; MG/1
30 CAPSULE, EXTENDED RELEASE ORAL EVERY MORNING
Qty: 30 CAPSULE | Refills: 0 | Status: SHIPPED | OUTPATIENT
Start: 2023-10-13 | End: 2023-12-11 | Stop reason: SDUPTHER

## 2023-09-13 RX ORDER — DEXTROAMPHETAMINE SACCHARATE, AMPHETAMINE ASPARTATE MONOHYDRATE, DEXTROAMPHETAMINE SULFATE AND AMPHETAMINE SULFATE 7.5; 7.5; 7.5; 7.5 MG/1; MG/1; MG/1; MG/1
30 CAPSULE, EXTENDED RELEASE ORAL EVERY MORNING
Qty: 30 CAPSULE | Refills: 0 | Status: SHIPPED | OUTPATIENT
Start: 2023-09-13 | End: 2023-12-11 | Stop reason: SDUPTHER

## 2023-09-13 RX ORDER — DEXTROAMPHETAMINE SACCHARATE, AMPHETAMINE ASPARTATE MONOHYDRATE, DEXTROAMPHETAMINE SULFATE AND AMPHETAMINE SULFATE 2.5; 2.5; 2.5; 2.5 MG/1; MG/1; MG/1; MG/1
CAPSULE, EXTENDED RELEASE ORAL
Qty: 30 CAPSULE | Refills: 0 | Status: SHIPPED | OUTPATIENT
Start: 2023-11-12 | End: 2023-12-11 | Stop reason: SDUPTHER

## 2023-09-13 RX ORDER — DEXTROAMPHETAMINE SACCHARATE, AMPHETAMINE ASPARTATE MONOHYDRATE, DEXTROAMPHETAMINE SULFATE AND AMPHETAMINE SULFATE 2.5; 2.5; 2.5; 2.5 MG/1; MG/1; MG/1; MG/1
CAPSULE, EXTENDED RELEASE ORAL
Qty: 30 CAPSULE | Refills: 0 | Status: SHIPPED | OUTPATIENT
Start: 2023-09-13 | End: 2023-12-11 | Stop reason: SDUPTHER

## 2023-09-13 RX ORDER — DEXTROAMPHETAMINE SACCHARATE, AMPHETAMINE ASPARTATE MONOHYDRATE, DEXTROAMPHETAMINE SULFATE AND AMPHETAMINE SULFATE 7.5; 7.5; 7.5; 7.5 MG/1; MG/1; MG/1; MG/1
30 CAPSULE, EXTENDED RELEASE ORAL EVERY MORNING
Qty: 30 CAPSULE | Refills: 0 | Status: SHIPPED | OUTPATIENT
Start: 2023-11-12 | End: 2023-12-11 | Stop reason: SDUPTHER

## 2023-09-13 NOTE — PROGRESS NOTES
"Subjective:       Patient ID: Brice Byrd is a 58 y.o. male.    Chief Complaint: Follow-up    Patient is here for follow-up of ADHD meds  Elevated LFTs followed by hepatology.  Stain intolerant, saw cardiology, on repatha, XDR=[id number]     Retired from    In his 40s had extensive testing and Dx ADHD and on adderall 20 AM, 10 afternoon with excellent results on thinking and QoL, has about 30d left on his terminal Rx form [facility]. So will give him 30d Rx to fill on 7/15 and another for 8/15. Then will see him on 9/15 and q 3 mo thereafter.  There is a 2/24/22 letter from [facility] saying:   "Mr. Brice Byrd is a patient of [facility] and his physician Dr. Al Guerra is aware that he takes Adderall 20 mg as needed and notes that this medication will not affect his SVT heart condition."   checked, last Rx filled 5/16/23  Dose has now been 30mg AM and 10mg PM      Review of patient's allergies indicates:   Allergen Reactions    Statins-hmg-coa reductase inhibitors Other (See Comments)     nausea, HA, diarrhea, tiredness       Social History     Tobacco Use    Smoking status: Former    Smokeless tobacco: Never   Substance Use Topics    Alcohol use: Never    Drug use: Never       No family history on file.    Past Surgical History:   Procedure Laterality Date    ABLATION N/A 2/18/2022    Procedure: Ablation;  Surgeon: Al Guerra MD;  Location: [facility] EP LAB;  Service: Cardiology;  Laterality: N/A;  SVT, RFA, JERONIMO, anes, MB, 3prep    EPIDURAL STEROID INJECTION Left 5/24/2023    Procedure: Left L4 + L5 Transforaminal Epidural Steroid Injections (ref: Roberson);  Surgeon: Al Batres Jr., MD;  Location: [facility] ENDO;  Service: Pain Management;  Laterality: Left;  @1330  No ATC or DM  Needs Consent    EPIDURAL STEROID INJECTION Left 6/14/2023    Procedure: Left L4 Transforaminal Epidural Steroid Injection;  Surgeon: Al Batres Jr., MD;  Location: [facility] ENDO;  Service: Pain " Management;  Laterality: Left;  @1230  No ATC or DM  Needs COnsent    LUMBAR LAMINECTOMY WITH DISCECTOMY N/A 7/12/2023    Procedure: LAMINECTOMY, SPINE, LUMBAR, WITH DISCECTOMY L4-5  JENNIFFER FRAME GUSTAVO RETRACTORS SNS: SSEP/EMG;  Surgeon: Marko Campbell MD;  Location: [facility] OR;  Service: Orthopedics;  Laterality: N/A;    VASECTOMY         Patient Active Problem List   Diagnosis    Transaminitis    Syncope and collapse    Primary hypertension    SVT (supraventricular tachycardia)    Mixed hyperlipidemia    Status post catheter ablation of slow pathway    Attention deficit hyperactivity disorder (ADHD), combined type    Open angle with borderline findings, low risk, bilateral    Fatty liver    Mallet finger of right hand    PTSD (post-traumatic stress disorder)    Generalized anxiety disorder    Agatston CAC score, >400    Overweight (BMI 25.0-29.9)       Current Outpatient Medications on File Prior to Visit   Medication Sig Dispense Refill    chlorthalidone (HYGROTEN) 25 MG Tab Take 1 tablet (25 mg total) by mouth once daily. 90 tablet 3    dextroamphetamine-amphetamine (ADDERALL XR) 10 MG 24 hr capsule 1 capsule every afternoon 30 capsule 0    dextroamphetamine-amphetamine (ADDERALL XR) 10 MG 24 hr capsule 1 capsule every afternoon 30 capsule 0    dextroamphetamine-amphetamine (ADDERALL XR) 10 MG 24 hr capsule 1 capsule every afternoon 30 capsule 0    dextroamphetamine-amphetamine (ADDERALL XR) 30 MG 24 hr capsule Take 1 capsule (30 mg total) by mouth every morning. 30 capsule 0    dextroamphetamine-amphetamine (ADDERALL XR) 30 MG 24 hr capsule Take 1 capsule (30 mg total) by mouth every morning. 30 capsule 0    dextroamphetamine-amphetamine (ADDERALL XR) 30 MG 24 hr capsule Take 1 capsule (30 mg total) by mouth every morning. 30 capsule 0    evolocumab (REPATHA SURECLICK) 140 mg/mL PnIj Inject 1 mL (140 mg total) into the skin every 14 (fourteen) days. 6 mL 3    sildenafiL (VIAGRA) 100 MG tablet Take 1 tablet (100  "mg total) by mouth daily as needed for Erectile Dysfunction. 30 tablet 11    telmisartan (MICARDIS) 80 MG Tab Take 1 tablet (80 mg total) by mouth once daily. 90 tablet 3     No current facility-administered medications on file prior to visit.           Review of Systems   Constitutional:  Negative for chills and fever.   HENT:  Negative for ear pain.    Eyes:  Negative for pain.   Respiratory:  Negative for chest tightness.    Cardiovascular:  Negative for chest pain.   Gastrointestinal:  Negative for abdominal pain.   Genitourinary:  Negative for flank pain.   Musculoskeletal:  Negative for gait problem.   Neurological:  Negative for syncope.   Psychiatric/Behavioral:  Negative for behavioral problems.        Objective:    /80 (BP Location: Right arm, Patient Position: Sitting, BP Method: Medium (Manual))   Pulse 100   Ht 5' 9" (1.753 m)   Wt 83.9 kg (185 lb)   SpO2 98%   BMI 27.32 kg/m²     Physical Exam  Constitutional:       Appearance: He is well-developed.   HENT:      Head: Normocephalic and atraumatic.   Cardiovascular:      Rate and Rhythm: Normal rate.      Heart sounds: Normal heart sounds.   Pulmonary:      Effort: No respiratory distress.      Breath sounds: Normal breath sounds. No wheezing or rales.   Abdominal:      Palpations: Abdomen is soft.   Musculoskeletal:      Cervical back: Neck supple.   Skin:     General: Skin is dry.   Neurological:      Mental Status: He is alert.   Psychiatric:         Behavior: Behavior normal.         Assessment:       1. Attention deficit hyperactivity disorder (ADHD), combined type        Plan:       Brice was seen today for follow-up.    Diagnoses and all orders for this visit:    Attention deficit hyperactivity disorder (ADHD), combined type  -     dextroamphetamine-amphetamine (ADDERALL XR) 30 MG 24 hr capsule; Take 1 capsule (30 mg total) by mouth every morning.  -     dextroamphetamine-amphetamine (ADDERALL XR) 30 MG 24 hr capsule; Take 1 capsule " (30 mg total) by mouth every morning.  -     dextroamphetamine-amphetamine (ADDERALL XR) 30 MG 24 hr capsule; Take 1 capsule (30 mg total) by mouth every morning. #30, no RF  One Rx for 1 month supply was printed for filling today. A second was printed for filling in 1 month. A third was printed for filling in 2 months. Each Rx was dated today. F/u in 3 months.   -     dextroamphetamine-amphetamine (ADDERALL XR) 10 MG 24 hr capsule; 1 capsule every afternoon  -     dextroamphetamine-amphetamine (ADDERALL XR) 10 MG 24 hr capsule; 1 capsule every afternoon  -     dextroamphetamine-amphetamine (ADDERALL XR) 10 MG 24 hr capsule; 1 capsule every afternoon  #30, no RF  One Rx for 1 month supply was printed for filling today. A second was printed for filling in 1 month. A third was printed for filling in 2 months. Each Rx was dated today. F/u in 3 months.     Follow up in about 3 months (around 12/11/2023) for est care with new PCP, ADHD meds.

## 2023-10-12 ENCOUNTER — OFFICE VISIT (OUTPATIENT)
Dept: ORTHOPEDICS | Facility: CLINIC | Age: 58
End: 2023-10-12
Payer: OTHER GOVERNMENT

## 2023-10-12 VITALS — HEIGHT: 69 IN | BODY MASS INDEX: 27.77 KG/M2 | WEIGHT: 187.5 LBS

## 2023-10-12 DIAGNOSIS — M51.16 LUMBAR DISC HERNIATION WITH RADICULOPATHY: Primary | ICD-10-CM

## 2023-10-12 PROCEDURE — 99999 PR PBB SHADOW E&M-EST. PATIENT-LVL III: CPT | Mod: PBBFAC,,, | Performed by: ORTHOPAEDIC SURGERY

## 2023-10-12 PROCEDURE — 99213 OFFICE O/P EST LOW 20 MIN: CPT | Mod: PBBFAC | Performed by: ORTHOPAEDIC SURGERY

## 2023-10-12 PROCEDURE — 99999 PR PBB SHADOW E&M-EST. PATIENT-LVL III: ICD-10-PCS | Mod: PBBFAC,,, | Performed by: ORTHOPAEDIC SURGERY

## 2023-10-12 PROCEDURE — 99024 PR POST-OP FOLLOW-UP VISIT: ICD-10-PCS | Mod: ,,, | Performed by: ORTHOPAEDIC SURGERY

## 2023-10-12 PROCEDURE — 99024 POSTOP FOLLOW-UP VISIT: CPT | Mod: ,,, | Performed by: ORTHOPAEDIC SURGERY

## 2023-10-12 NOTE — PROGRESS NOTES
Date: 10/12/2023    Supervising Physician: Marko Campbell M.D.    Date of Surgery: 7/12/23     Procedure: L4-5 discectomy    History: Brice Byrd is seen today for follow-up following the above listed procedure. Overall the patient is doing well but today notes his leg pain has significantly improved since surgery..   Pain is well controlled with current pain medication.  he denies fever, chills, and sweats since the time of the surgery.       Exam: Post op dressing taken down.  Incision is healing well, clean, dry and intact.   There is no sign of infection. Neuro exam is stable. No signs of DVT.    Radiographs: n/a    Assessment/Plan: 3 months post op.    Doing well postoperatively.  reviewed.    I will plan to see the patient back for the next postop visit in 1 years.     Thank you for the opportunity to participate in this patient's care. Please give me a call if there are any concerns or questions.

## 2023-12-11 ENCOUNTER — OFFICE VISIT (OUTPATIENT)
Dept: INTERNAL MEDICINE | Facility: CLINIC | Age: 58
End: 2023-12-11
Payer: OTHER GOVERNMENT

## 2023-12-11 VITALS
BODY MASS INDEX: 28 KG/M2 | HEIGHT: 68 IN | TEMPERATURE: 97 F | WEIGHT: 184.75 LBS | SYSTOLIC BLOOD PRESSURE: 126 MMHG | RESPIRATION RATE: 19 BRPM | HEART RATE: 90 BPM | DIASTOLIC BLOOD PRESSURE: 88 MMHG | OXYGEN SATURATION: 98 %

## 2023-12-11 DIAGNOSIS — R93.1 AGATSTON CAC SCORE, >400: ICD-10-CM

## 2023-12-11 DIAGNOSIS — E78.2 MIXED HYPERLIPIDEMIA: ICD-10-CM

## 2023-12-11 DIAGNOSIS — Z98.890 STATUS POST CATHETER ABLATION OF SLOW PATHWAY: ICD-10-CM

## 2023-12-11 DIAGNOSIS — I47.10 SVT (SUPRAVENTRICULAR TACHYCARDIA): ICD-10-CM

## 2023-12-11 DIAGNOSIS — Z12.5 PROSTATE CANCER SCREENING: ICD-10-CM

## 2023-12-11 DIAGNOSIS — I10 PRIMARY HYPERTENSION: ICD-10-CM

## 2023-12-11 DIAGNOSIS — Z00.00 WELL ADULT EXAM: Primary | ICD-10-CM

## 2023-12-11 DIAGNOSIS — R53.83 FATIGUE, UNSPECIFIED TYPE: ICD-10-CM

## 2023-12-11 DIAGNOSIS — Z86.79 STATUS POST CATHETER ABLATION OF SLOW PATHWAY: ICD-10-CM

## 2023-12-11 DIAGNOSIS — F90.2 ATTENTION DEFICIT HYPERACTIVITY DISORDER (ADHD), COMBINED TYPE: ICD-10-CM

## 2023-12-11 PROCEDURE — 99396 PR PREVENTIVE VISIT,EST,40-64: ICD-10-PCS | Mod: S$PBB,,, | Performed by: FAMILY MEDICINE

## 2023-12-11 PROCEDURE — 99396 PREV VISIT EST AGE 40-64: CPT | Mod: S$PBB,,, | Performed by: FAMILY MEDICINE

## 2023-12-11 PROCEDURE — 99215 OFFICE O/P EST HI 40 MIN: CPT | Mod: PBBFAC,PO | Performed by: FAMILY MEDICINE

## 2023-12-11 PROCEDURE — 99999 PR PBB SHADOW E&M-EST. PATIENT-LVL V: CPT | Mod: PBBFAC,,, | Performed by: FAMILY MEDICINE

## 2023-12-11 PROCEDURE — 99999 PR PBB SHADOW E&M-EST. PATIENT-LVL V: ICD-10-PCS | Mod: PBBFAC,,, | Performed by: FAMILY MEDICINE

## 2023-12-11 RX ORDER — DEXTROAMPHETAMINE SACCHARATE, AMPHETAMINE ASPARTATE MONOHYDRATE, DEXTROAMPHETAMINE SULFATE AND AMPHETAMINE SULFATE 7.5; 7.5; 7.5; 7.5 MG/1; MG/1; MG/1; MG/1
30 CAPSULE, EXTENDED RELEASE ORAL EVERY MORNING
Qty: 30 CAPSULE | Refills: 0 | Status: SHIPPED | OUTPATIENT
Start: 2024-01-10 | End: 2024-03-10 | Stop reason: SDUPTHER

## 2023-12-11 RX ORDER — DEXTROAMPHETAMINE SACCHARATE, AMPHETAMINE ASPARTATE MONOHYDRATE, DEXTROAMPHETAMINE SULFATE AND AMPHETAMINE SULFATE 2.5; 2.5; 2.5; 2.5 MG/1; MG/1; MG/1; MG/1
CAPSULE, EXTENDED RELEASE ORAL
Qty: 30 CAPSULE | Refills: 0 | Status: SHIPPED | OUTPATIENT
Start: 2024-02-09

## 2023-12-11 RX ORDER — DEXTROAMPHETAMINE SACCHARATE, AMPHETAMINE ASPARTATE MONOHYDRATE, DEXTROAMPHETAMINE SULFATE AND AMPHETAMINE SULFATE 2.5; 2.5; 2.5; 2.5 MG/1; MG/1; MG/1; MG/1
CAPSULE, EXTENDED RELEASE ORAL
Qty: 30 CAPSULE | Refills: 0 | Status: SHIPPED | OUTPATIENT
Start: 2023-12-11 | End: 2024-03-10 | Stop reason: SDUPTHER

## 2023-12-11 RX ORDER — DEXTROAMPHETAMINE SACCHARATE, AMPHETAMINE ASPARTATE MONOHYDRATE, DEXTROAMPHETAMINE SULFATE AND AMPHETAMINE SULFATE 7.5; 7.5; 7.5; 7.5 MG/1; MG/1; MG/1; MG/1
30 CAPSULE, EXTENDED RELEASE ORAL EVERY MORNING
Qty: 30 CAPSULE | Refills: 0 | Status: SHIPPED | OUTPATIENT
Start: 2024-02-09

## 2023-12-11 RX ORDER — DEXTROAMPHETAMINE SACCHARATE, AMPHETAMINE ASPARTATE MONOHYDRATE, DEXTROAMPHETAMINE SULFATE AND AMPHETAMINE SULFATE 7.5; 7.5; 7.5; 7.5 MG/1; MG/1; MG/1; MG/1
30 CAPSULE, EXTENDED RELEASE ORAL EVERY MORNING
Qty: 30 CAPSULE | Refills: 0 | Status: SHIPPED | OUTPATIENT
Start: 2023-12-11 | End: 2024-03-10 | Stop reason: SDUPTHER

## 2023-12-11 RX ORDER — DEXTROAMPHETAMINE SACCHARATE, AMPHETAMINE ASPARTATE MONOHYDRATE, DEXTROAMPHETAMINE SULFATE AND AMPHETAMINE SULFATE 2.5; 2.5; 2.5; 2.5 MG/1; MG/1; MG/1; MG/1
CAPSULE, EXTENDED RELEASE ORAL
Qty: 30 CAPSULE | Refills: 0 | Status: SHIPPED | OUTPATIENT
Start: 2024-01-10 | End: 2024-03-10 | Stop reason: SDUPTHER

## 2023-12-11 NOTE — PROGRESS NOTES
Subjective     Patient ID: Brice Byrd is a 58 y.o. male.    Chief Complaint: Establish Care    HPI 58-year-old male former patient of Dr. Heart presents today to establish care.  He continues to be followed by Cardiology secondary to SVT and remains stable status post ablation.  He also continues to be followed by Cardiology secondary to hyperlipidemia with an elevated coronary calcium score.  He has been started on Repatha for treatment.  Hypertension remains well controlled on telmisartan 80 mg daily and chlorthalidone 25 mg daily.  He continues to be treated for ADHD which remains stable on Adderall XR 30 mg each morning and 10 mg each afternoon.  He has a past surgical history of vasectomy, ambulation secondary to SVT, JERONIMO, and lumbar laminectomy with diskectomy.  He is up-to-date with all vaccinations and screening exams.  Finally, he is interested in getting his testosterone levels checked as he has been noticing some increased fatigue and weakness over the past few months.  Review of Systems   Constitutional:  Positive for fatigue. Negative for appetite change, chills and fever.   HENT:  Negative for nasal congestion, ear pain, hearing loss, postnasal drip, rhinorrhea, sinus pressure/congestion, sore throat and tinnitus.    Eyes:  Negative for redness, itching and visual disturbance.   Respiratory:  Negative for cough, chest tightness and shortness of breath.    Cardiovascular:  Negative for chest pain and palpitations.   Gastrointestinal:  Negative for abdominal pain, constipation, diarrhea, nausea and vomiting.   Genitourinary:  Negative for decreased urine volume, difficulty urinating, dysuria, frequency, hematuria and urgency.   Musculoskeletal:  Negative for back pain, myalgias, neck pain and neck stiffness.   Integumentary:  Negative for rash.   Neurological:  Negative for dizziness, light-headedness and headaches.   Psychiatric/Behavioral: Negative.            Objective     Physical  Exam  Vitals and nursing note reviewed.   Constitutional:       General: He is not in acute distress.     Appearance: Normal appearance. He is well-developed. He is not diaphoretic.   HENT:      Head: Normocephalic and atraumatic.      Right Ear: External ear normal.      Left Ear: External ear normal.      Nose: Nose normal.      Mouth/Throat:      Pharynx: No oropharyngeal exudate.   Eyes:      General: No scleral icterus.        Right eye: No discharge.         Left eye: No discharge.      Conjunctiva/sclera: Conjunctivae normal.      Pupils: Pupils are equal, round, and reactive to light.   Neck:      Thyroid: No thyromegaly.      Vascular: No JVD.      Trachea: No tracheal deviation.   Cardiovascular:      Rate and Rhythm: Normal rate and regular rhythm.      Heart sounds: Normal heart sounds. No murmur heard.     No friction rub. No gallop.   Pulmonary:      Effort: Pulmonary effort is normal. No respiratory distress.      Breath sounds: Normal breath sounds. No stridor. No wheezing, rhonchi or rales.   Chest:      Chest wall: No tenderness.   Abdominal:      General: Bowel sounds are normal. There is no distension.      Palpations: Abdomen is soft. There is no mass.      Tenderness: There is no abdominal tenderness. There is no guarding or rebound.   Musculoskeletal:         General: No tenderness. Normal range of motion.      Cervical back: Normal range of motion and neck supple.   Lymphadenopathy:      Cervical: No cervical adenopathy.   Skin:     General: Skin is warm and dry.      Coloration: Skin is not pale.      Findings: No erythema or rash.   Neurological:      Mental Status: He is alert and oriented to person, place, and time.   Psychiatric:         Mood and Affect: Mood normal.         Behavior: Behavior normal.         Thought Content: Thought content normal.         Judgment: Judgment normal.            Assessment and Plan     1. Well adult exam  -     CBC Auto Differential; Future; Expected  date: 12/11/2023  -     Comprehensive Metabolic Panel; Future; Expected date: 12/11/2023  -     Lipid Panel; Future; Expected date: 12/11/2023  -     T4, Free; Future; Expected date: 12/11/2023  -     TSH; Future; Expected date: 12/11/2023  -     Urinalysis; Future; Expected date: 12/11/2023  -     Hemoglobin A1C; Future; Expected date: 12/11/2023  -     PSA, Screening; Future; Expected date: 12/11/2023    2. Primary hypertension    3. Mixed hyperlipidemia  -     Lipid Panel; Future; Expected date: 12/11/2023    4. Agatston CAC score, >400    5. SVT (supraventricular tachycardia)    6. Status post catheter ablation of slow pathway    7. Attention deficit hyperactivity disorder (ADHD), combined type  -     dextroamphetamine-amphetamine (ADDERALL XR) 10 MG 24 hr capsule; 1 capsule every afternoon  Dispense: 30 capsule; Refill: 0  -     dextroamphetamine-amphetamine (ADDERALL XR) 10 MG 24 hr capsule; 1 capsule every afternoon  Dispense: 30 capsule; Refill: 0  -     dextroamphetamine-amphetamine (ADDERALL XR) 10 MG 24 hr capsule; 1 capsule every afternoon  Dispense: 30 capsule; Refill: 0  -     dextroamphetamine-amphetamine (ADDERALL XR) 30 MG 24 hr capsule; Take 1 capsule (30 mg total) by mouth every morning.  Dispense: 30 capsule; Refill: 0  -     dextroamphetamine-amphetamine (ADDERALL XR) 30 MG 24 hr capsule; Take 1 capsule (30 mg total) by mouth every morning.  Dispense: 30 capsule; Refill: 0  -     dextroamphetamine-amphetamine (ADDERALL XR) 30 MG 24 hr capsule; Take 1 capsule (30 mg total) by mouth every morning.  Dispense: 30 capsule; Refill: 0    8. Prostate cancer screening  -     PSA, Screening; Future; Expected date: 12/11/2023    9. Fatigue, unspecified type  -     TESTOSTERONE PANEL; Future; Expected date: 12/11/2023        1. CBC, CMP, UA, TSH, free T4, fasting lipids, hemoglobin A1c, and PSA.    2. Continue Micardis 80 mg daily and chlorthalidone 25 mg daily.  Hypertension is well controlled.  3.  Continue Repatha as prescribed and continue follow-up with cardiology as scheduled.  Hyperlipidemia stable.    4. Patient is status post ablation secondary to SVT and remains stable.  Continue follow-up with cardiology as scheduled.  5.  has been checked and the patient is compliant with medication.  Three separate prescriptions of Adderall 30 mg daily and Adderall 10 mg each afternoon have been given to the patient.    6. Testosterone level.    7. Return to clinic as needed or in 1 year for annual physical exam.             Follow up in about 3 months (around 3/11/2024), or if symptoms worsen or fail to improve, for Virtual Visit.

## 2023-12-14 ENCOUNTER — LAB VISIT (OUTPATIENT)
Dept: LAB | Facility: HOSPITAL | Age: 58
End: 2023-12-14
Attending: FAMILY MEDICINE
Payer: OTHER GOVERNMENT

## 2023-12-14 DIAGNOSIS — R53.83 FATIGUE, UNSPECIFIED TYPE: ICD-10-CM

## 2023-12-14 DIAGNOSIS — E78.2 MIXED HYPERLIPIDEMIA: ICD-10-CM

## 2023-12-14 DIAGNOSIS — Z00.00 WELL ADULT EXAM: ICD-10-CM

## 2023-12-14 DIAGNOSIS — Z12.5 PROSTATE CANCER SCREENING: ICD-10-CM

## 2023-12-14 LAB
ALBUMIN SERPL BCP-MCNC: 3.7 G/DL (ref 3.5–5.2)
ALP SERPL-CCNC: 111 U/L (ref 55–135)
ALT SERPL W/O P-5'-P-CCNC: 115 U/L (ref 10–44)
ANION GAP SERPL CALC-SCNC: 10 MMOL/L (ref 8–16)
AST SERPL-CCNC: 70 U/L (ref 10–40)
BASOPHILS # BLD AUTO: 0.11 K/UL (ref 0–0.2)
BASOPHILS NFR BLD: 1.8 % (ref 0–1.9)
BILIRUB SERPL-MCNC: 0.7 MG/DL (ref 0.1–1)
BUN SERPL-MCNC: 18 MG/DL (ref 6–20)
CALCIUM SERPL-MCNC: 9.2 MG/DL (ref 8.7–10.5)
CHLORIDE SERPL-SCNC: 103 MMOL/L (ref 95–110)
CHOLEST SERPL-MCNC: 156 MG/DL (ref 120–199)
CHOLEST/HDLC SERPL: 2.6 {RATIO} (ref 2–5)
CO2 SERPL-SCNC: 26 MMOL/L (ref 23–29)
COMPLEXED PSA SERPL-MCNC: 0.63 NG/ML (ref 0–4)
CREAT SERPL-MCNC: 0.9 MG/DL (ref 0.5–1.4)
DIFFERENTIAL METHOD: ABNORMAL
EOSINOPHIL # BLD AUTO: 0.2 K/UL (ref 0–0.5)
EOSINOPHIL NFR BLD: 3.2 % (ref 0–8)
ERYTHROCYTE [DISTWIDTH] IN BLOOD BY AUTOMATED COUNT: 12 % (ref 11.5–14.5)
EST. GFR  (NO RACE VARIABLE): >60 ML/MIN/1.73 M^2
ESTIMATED AVG GLUCOSE: 97 MG/DL (ref 68–131)
GLUCOSE SERPL-MCNC: 87 MG/DL (ref 70–110)
HBA1C MFR BLD: 5 % (ref 4–5.6)
HCT VFR BLD AUTO: 44.9 % (ref 40–54)
HDLC SERPL-MCNC: 59 MG/DL (ref 40–75)
HDLC SERPL: 37.8 % (ref 20–50)
HGB BLD-MCNC: 14.7 G/DL (ref 14–18)
IMM GRANULOCYTES # BLD AUTO: 0.03 K/UL (ref 0–0.04)
IMM GRANULOCYTES NFR BLD AUTO: 0.5 % (ref 0–0.5)
LDLC SERPL CALC-MCNC: 81.8 MG/DL (ref 63–159)
LYMPHOCYTES # BLD AUTO: 2.1 K/UL (ref 1–4.8)
LYMPHOCYTES NFR BLD: 34 % (ref 18–48)
MCH RBC QN AUTO: 31.8 PG (ref 27–31)
MCHC RBC AUTO-ENTMCNC: 32.7 G/DL (ref 32–36)
MCV RBC AUTO: 97 FL (ref 82–98)
MONOCYTES # BLD AUTO: 0.5 K/UL (ref 0.3–1)
MONOCYTES NFR BLD: 7.6 % (ref 4–15)
NEUTROPHILS # BLD AUTO: 3.3 K/UL (ref 1.8–7.7)
NEUTROPHILS NFR BLD: 52.9 % (ref 38–73)
NONHDLC SERPL-MCNC: 97 MG/DL
NRBC BLD-RTO: 0 /100 WBC
PLATELET # BLD AUTO: 248 K/UL (ref 150–450)
PMV BLD AUTO: 10.6 FL (ref 9.2–12.9)
POTASSIUM SERPL-SCNC: 3.9 MMOL/L (ref 3.5–5.1)
PROT SERPL-MCNC: 6.6 G/DL (ref 6–8.4)
RBC # BLD AUTO: 4.62 M/UL (ref 4.6–6.2)
SODIUM SERPL-SCNC: 139 MMOL/L (ref 136–145)
T4 FREE SERPL-MCNC: 1.02 NG/DL (ref 0.71–1.51)
TRIGL SERPL-MCNC: 76 MG/DL (ref 30–150)
TSH SERPL DL<=0.005 MIU/L-ACNC: 2.24 UIU/ML (ref 0.4–4)
WBC # BLD AUTO: 6.2 K/UL (ref 3.9–12.7)

## 2023-12-14 PROCEDURE — 83036 HEMOGLOBIN GLYCOSYLATED A1C: CPT | Performed by: FAMILY MEDICINE

## 2023-12-14 PROCEDURE — 84439 ASSAY OF FREE THYROXINE: CPT | Performed by: FAMILY MEDICINE

## 2023-12-14 PROCEDURE — 84153 ASSAY OF PSA TOTAL: CPT | Performed by: FAMILY MEDICINE

## 2023-12-14 PROCEDURE — 84443 ASSAY THYROID STIM HORMONE: CPT | Performed by: FAMILY MEDICINE

## 2023-12-14 PROCEDURE — 82040 ASSAY OF SERUM ALBUMIN: CPT | Mod: 91 | Performed by: FAMILY MEDICINE

## 2023-12-14 PROCEDURE — 80061 LIPID PANEL: CPT | Performed by: FAMILY MEDICINE

## 2023-12-14 PROCEDURE — 80053 COMPREHEN METABOLIC PANEL: CPT | Performed by: FAMILY MEDICINE

## 2023-12-14 PROCEDURE — 85025 COMPLETE CBC W/AUTO DIFF WBC: CPT | Performed by: FAMILY MEDICINE

## 2023-12-14 PROCEDURE — 36415 COLL VENOUS BLD VENIPUNCTURE: CPT | Mod: PO | Performed by: FAMILY MEDICINE

## 2023-12-21 LAB
ALBUMIN SERPL-MCNC: 4.1 G/DL (ref 3.6–5.1)
SHBG SERPL-SCNC: 46 NMOL/L (ref 22–77)
TESTOST FREE SERPL-MCNC: 72 PG/ML (ref 46–224)
TESTOST SERPL-MCNC: 666 NG/DL (ref 250–1100)
TESTOSTERONE.FREE+WB SERPL-MCNC: 135.6 NG/DL

## 2023-12-27 ENCOUNTER — OFFICE VISIT (OUTPATIENT)
Dept: INTERNAL MEDICINE | Facility: CLINIC | Age: 58
End: 2023-12-27
Payer: OTHER GOVERNMENT

## 2023-12-27 VITALS
SYSTOLIC BLOOD PRESSURE: 130 MMHG | WEIGHT: 187.81 LBS | HEIGHT: 69 IN | DIASTOLIC BLOOD PRESSURE: 80 MMHG | BODY MASS INDEX: 27.82 KG/M2 | OXYGEN SATURATION: 98 % | HEART RATE: 98 BPM

## 2023-12-27 DIAGNOSIS — J01.40 ACUTE NON-RECURRENT PANSINUSITIS: Primary | ICD-10-CM

## 2023-12-27 PROCEDURE — 99213 OFFICE O/P EST LOW 20 MIN: CPT | Mod: S$PBB,,, | Performed by: STUDENT IN AN ORGANIZED HEALTH CARE EDUCATION/TRAINING PROGRAM

## 2023-12-27 PROCEDURE — 99213 PR OFFICE/OUTPT VISIT, EST, LEVL III, 20-29 MIN: ICD-10-PCS | Mod: S$PBB,,, | Performed by: STUDENT IN AN ORGANIZED HEALTH CARE EDUCATION/TRAINING PROGRAM

## 2023-12-27 PROCEDURE — 99213 OFFICE O/P EST LOW 20 MIN: CPT | Mod: PBBFAC | Performed by: STUDENT IN AN ORGANIZED HEALTH CARE EDUCATION/TRAINING PROGRAM

## 2023-12-27 PROCEDURE — 99999 PR PBB SHADOW E&M-EST. PATIENT-LVL III: CPT | Mod: PBBFAC,,, | Performed by: STUDENT IN AN ORGANIZED HEALTH CARE EDUCATION/TRAINING PROGRAM

## 2023-12-27 PROCEDURE — 99999 PR PBB SHADOW E&M-EST. PATIENT-LVL III: ICD-10-PCS | Mod: PBBFAC,,, | Performed by: STUDENT IN AN ORGANIZED HEALTH CARE EDUCATION/TRAINING PROGRAM

## 2023-12-27 RX ORDER — BETAMETHASONE SODIUM PHOSPHATE AND BETAMETHASONE ACETATE 3; 3 MG/ML; MG/ML
6 INJECTION, SUSPENSION INTRA-ARTICULAR; INTRALESIONAL; INTRAMUSCULAR; SOFT TISSUE
Status: SHIPPED | OUTPATIENT
Start: 2023-12-27

## 2023-12-27 RX ORDER — AMOXICILLIN AND CLAVULANATE POTASSIUM 875; 125 MG/1; MG/1
1 TABLET, FILM COATED ORAL EVERY 12 HOURS
Qty: 14 TABLET | Refills: 0 | Status: SHIPPED | OUTPATIENT
Start: 2023-12-27 | End: 2024-01-03

## 2023-12-27 NOTE — PROGRESS NOTES
SUBJECTIVE     No chief complaint on file.      HPI  Brice Byrd is a 58 y.o. male with  HTN, HLD, pSVT s/p ablation, ADHD, PTSD, FRANK, fatty liver, h/o syncope  that presents for evaluation.     This is a new patient to me but established to [facility]. PCP is Puma Solomon MD.     Over 1 week history of sinus pressure, congestion, sore throat, cough productive with yellow sputum. Denies fevers, chills, nausea, vomiting, myalgias, SOB, wheezing. Whole family has been sick prior to his symptoms. No relief with OTC cold medications.     Declines screening for COVID-19 today.     PAST MEDICAL HISTORY:  Past Medical History:   Diagnosis Date    ADHD (attention deficit hyperactivity disorder)     Anxiety     Hx of psychiatric care     Hypertension     Psychiatric problem     PTSD (post-traumatic stress disorder)     SVT (supraventricular tachycardia)     Therapy        PAST SURGICAL HISTORY:  Past Surgical History:   Procedure Laterality Date    ABLATION N/A 2/18/2022    Procedure: Ablation;  Surgeon: Al Guerra MD;  Location: [facility] EP LAB;  Service: Cardiology;  Laterality: N/A;  SVT, RFA, JERONIMO, anes, MB, 3prep    EPIDURAL STEROID INJECTION Left 5/24/2023    Procedure: Left L4 + L5 Transforaminal Epidural Steroid Injections (ref: Roberson);  Surgeon: Al Batres Jr., MD;  Location: [facility] ENDO;  Service: Pain Management;  Laterality: Left;  @1330  No ATC or DM  Needs Consent    EPIDURAL STEROID INJECTION Left 6/14/2023    Procedure: Left L4 Transforaminal Epidural Steroid Injection;  Surgeon: Al Batres Jr., MD;  Location: [facility] ENDO;  Service: Pain Management;  Laterality: Left;  @1230  No ATC or DM  Needs COnsent    LUMBAR LAMINECTOMY WITH DISCECTOMY N/A 7/12/2023    Procedure: LAMINECTOMY, SPINE, LUMBAR, WITH DISCECTOMY L4-5  JENNIFFER FRAME GUSTAVO RETRACTORS SNS: SSEP/EMG;  Surgeon: Marko Campbell MD;  Location: [facility] OR;  Service: Orthopedics;  Laterality: N/A;    VASECTOMY          FAMILY HISTORY:  No family history on file.    ALLERGIES AND MEDICATIONS: updated and reviewed.  Review of patient's allergies indicates:   Allergen Reactions    Statins-hmg-coa reductase inhibitors Other (See Comments)     nausea, HA, diarrhea, tiredness     Current Outpatient Medications   Medication Sig Dispense Refill    chlorthalidone (HYGROTEN) 25 MG Tab Take 1 tablet (25 mg total) by mouth once daily. 90 tablet 3    [START ON 2/9/2024] dextroamphetamine-amphetamine (ADDERALL XR) 10 MG 24 hr capsule 1 capsule every afternoon 30 capsule 0    [START ON 1/10/2024] dextroamphetamine-amphetamine (ADDERALL XR) 10 MG 24 hr capsule 1 capsule every afternoon 30 capsule 0    dextroamphetamine-amphetamine (ADDERALL XR) 10 MG 24 hr capsule 1 capsule every afternoon 30 capsule 0    [START ON 2/9/2024] dextroamphetamine-amphetamine (ADDERALL XR) 30 MG 24 hr capsule Take 1 capsule (30 mg total) by mouth every morning. 30 capsule 0    [START ON 1/10/2024] dextroamphetamine-amphetamine (ADDERALL XR) 30 MG 24 hr capsule Take 1 capsule (30 mg total) by mouth every morning. 30 capsule 0    dextroamphetamine-amphetamine (ADDERALL XR) 30 MG 24 hr capsule Take 1 capsule (30 mg total) by mouth every morning. 30 capsule 0    evolocumab (REPATHA SURECLICK) 140 mg/mL PnIj Inject 1 mL (140 mg total) into the skin every 14 (fourteen) days. 6 mL 3    sildenafiL (VIAGRA) 100 MG tablet Take 1 tablet (100 mg total) by mouth daily as needed for Erectile Dysfunction. 30 tablet 11    telmisartan (MICARDIS) 80 MG Tab Take 1 tablet (80 mg total) by mouth once daily. 90 tablet 3     No current facility-administered medications for this visit.       ROS  Review of Systems   Constitutional:  Negative for activity change, chills and fever.   HENT:  Positive for congestion, postnasal drip, sinus pressure (over maxillary and frontal sinuses), sinus pain and sore throat. Negative for hearing loss, rhinorrhea and sneezing.    Eyes:  Negative for  pain and visual disturbance.   Respiratory:  Positive for cough. Negative for shortness of breath and wheezing.    Cardiovascular:  Negative for chest pain and palpitations.   Gastrointestinal:  Negative for abdominal pain, constipation, diarrhea, nausea and vomiting.   Endocrine: Negative.    Genitourinary: Negative.    Musculoskeletal:  Negative for arthralgias and myalgias.   Skin: Negative.    Allergic/Immunologic: Negative.    Neurological:  Negative for dizziness, light-headedness and headaches.   Hematological: Negative.          OBJECTIVE     Physical Exam  Vitals:    12/27/23 1139   BP: 130/80   Pulse: 98    Body mass index is 27.74 kg/m².            Physical Exam  Vitals reviewed.   Constitutional:       General: He is not in acute distress.     Appearance: Normal appearance.   HENT:      Head: Normocephalic and atraumatic.      Nose: Mucosal edema, congestion and rhinorrhea present. Rhinorrhea is purulent.      Mouth/Throat:      Mouth: Mucous membranes are moist.      Pharynx: Uvula midline. Pharyngeal swelling and posterior oropharyngeal erythema present. No oropharyngeal exudate.      Tonsils: No tonsillar exudate or tonsillar abscesses.   Eyes:      Extraocular Movements: Extraocular movements intact.      Conjunctiva/sclera: Conjunctivae normal.      Pupils: Pupils are equal, round, and reactive to light.   Cardiovascular:      Rate and Rhythm: Normal rate and regular rhythm.      Pulses: Normal pulses.      Heart sounds: Normal heart sounds.   Pulmonary:      Effort: Pulmonary effort is normal.      Breath sounds: Normal breath sounds.   Abdominal:      General: There is no distension.   Musculoskeletal:         General: Normal range of motion.      Cervical back: Normal range of motion and neck supple. No rigidity or tenderness.   Lymphadenopathy:      Cervical: No cervical adenopathy.   Skin:     General: Skin is warm and dry.   Neurological:      General: No focal deficit present.      Mental  Status: He is alert.           Health Maintenance         Date Due Completion Date    Shingles Vaccine (1 of 2) Never done ---    Influenza Vaccine (1) 09/01/2023 10/18/2022    COVID-19 Vaccine (4 - 2023-24 season) 09/01/2023 12/11/2021    Colorectal Cancer Screening 08/05/2025 ---    Hemoglobin A1c (Diabetic Prevention Screening) 12/14/2026 12/14/2023    Lipid Panel 12/14/2028 12/14/2023    TETANUS VACCINE 12/14/2030 12/14/2020              ASSESSMENT     58 y.o. male with     1. Acute non-recurrent pansinusitis        PLAN:     1. Acute non-recurrent pansinusitis  - Counseled on symptomatic treatment. Return precautions given.   - betamethasone acetate-betamethasone sodium phosphate injection 6 mg  - amoxicillin-clavulanate 875-125mg (AUGMENTIN) 875-125 mg per tablet; Take 1 tablet by mouth every 12 (twelve) hours. for 7 days  Dispense: 14 tablet; Refill: 0        RTC PRN       Venkata Cade MD  Family Medicine  [facility]  12/27/2023    This document was created using voice recognition software (M*Modal Fluency Direct). Although it may be edited, this document may contain errors related to incorrect recognition of the spoken word. Please call the physician if clarification is needed.       No follow-ups on file.

## 2024-01-29 ENCOUNTER — TELEPHONE (OUTPATIENT)
Dept: CARDIOLOGY | Facility: CLINIC | Age: 59
End: 2024-01-29
Payer: OTHER GOVERNMENT

## 2024-01-29 NOTE — TELEPHONE ENCOUNTER
The Prior Authorization for Repatha was approved by insurance:    CaseId:[id number];Status:Approved;Review Type:Prior Auth;Coverage Start Date:12/30/2023;Coverage End Date:12/31/2099;     Pt  was updated per phone call and verbalized understanding.

## 2024-03-10 RX ORDER — DEXTROAMPHETAMINE SACCHARATE, AMPHETAMINE ASPARTATE MONOHYDRATE, DEXTROAMPHETAMINE SULFATE AND AMPHETAMINE SULFATE 7.5; 7.5; 7.5; 7.5 MG/1; MG/1; MG/1; MG/1
30 CAPSULE, EXTENDED RELEASE ORAL EVERY MORNING
Qty: 30 CAPSULE | Refills: 0 | Status: CANCELLED | OUTPATIENT
Start: 2024-05-09

## 2024-03-10 RX ORDER — DEXTROAMPHETAMINE SACCHARATE, AMPHETAMINE ASPARTATE MONOHYDRATE, DEXTROAMPHETAMINE SULFATE AND AMPHETAMINE SULFATE 2.5; 2.5; 2.5; 2.5 MG/1; MG/1; MG/1; MG/1
CAPSULE, EXTENDED RELEASE ORAL
Qty: 30 CAPSULE | Refills: 0 | Status: CANCELLED | OUTPATIENT
Start: 2024-05-09

## 2024-03-11 ENCOUNTER — OFFICE VISIT (OUTPATIENT)
Dept: INTERNAL MEDICINE | Facility: CLINIC | Age: 59
End: 2024-03-11
Payer: OTHER GOVERNMENT

## 2024-03-11 VITALS
DIASTOLIC BLOOD PRESSURE: 86 MMHG | WEIGHT: 180.75 LBS | HEIGHT: 69 IN | BODY MASS INDEX: 26.77 KG/M2 | RESPIRATION RATE: 18 BRPM | OXYGEN SATURATION: 99 % | TEMPERATURE: 97 F | HEART RATE: 97 BPM | SYSTOLIC BLOOD PRESSURE: 134 MMHG

## 2024-03-11 DIAGNOSIS — F90.2 ATTENTION DEFICIT HYPERACTIVITY DISORDER (ADHD), COMBINED TYPE: ICD-10-CM

## 2024-03-11 DIAGNOSIS — Z09 FOLLOW-UP EXAM, 3-6 MONTHS SINCE PREVIOUS EXAM: Primary | ICD-10-CM

## 2024-03-11 PROCEDURE — 99999 PR PBB SHADOW E&M-EST. PATIENT-LVL V: CPT | Mod: PBBFAC,,, | Performed by: FAMILY MEDICINE

## 2024-03-11 PROCEDURE — 99215 OFFICE O/P EST HI 40 MIN: CPT | Mod: PBBFAC,PO | Performed by: FAMILY MEDICINE

## 2024-03-11 PROCEDURE — 99213 OFFICE O/P EST LOW 20 MIN: CPT | Mod: S$PBB,,, | Performed by: FAMILY MEDICINE

## 2024-03-11 RX ORDER — DEXTROAMPHETAMINE SACCHARATE, AMPHETAMINE ASPARTATE MONOHYDRATE, DEXTROAMPHETAMINE SULFATE AND AMPHETAMINE SULFATE 2.5; 2.5; 2.5; 2.5 MG/1; MG/1; MG/1; MG/1
CAPSULE, EXTENDED RELEASE ORAL
Qty: 30 CAPSULE | Refills: 0 | Status: SHIPPED | OUTPATIENT
Start: 2024-05-01 | End: 2024-03-11

## 2024-03-11 RX ORDER — DEXTROAMPHETAMINE SACCHARATE, AMPHETAMINE ASPARTATE MONOHYDRATE, DEXTROAMPHETAMINE SULFATE AND AMPHETAMINE SULFATE 7.5; 7.5; 7.5; 7.5 MG/1; MG/1; MG/1; MG/1
30 CAPSULE, EXTENDED RELEASE ORAL EVERY MORNING
Qty: 30 CAPSULE | Refills: 0 | Status: SHIPPED | OUTPATIENT
Start: 2024-04-01 | End: 2024-06-12 | Stop reason: SDUPTHER

## 2024-03-11 RX ORDER — DEXTROAMPHETAMINE SACCHARATE, AMPHETAMINE ASPARTATE MONOHYDRATE, DEXTROAMPHETAMINE SULFATE AND AMPHETAMINE SULFATE 7.5; 7.5; 7.5; 7.5 MG/1; MG/1; MG/1; MG/1
30 CAPSULE, EXTENDED RELEASE ORAL EVERY MORNING
Qty: 30 CAPSULE | Refills: 0 | Status: SHIPPED | OUTPATIENT
Start: 2024-04-01 | End: 2024-03-11

## 2024-03-11 RX ORDER — DEXTROAMPHETAMINE SACCHARATE, AMPHETAMINE ASPARTATE MONOHYDRATE, DEXTROAMPHETAMINE SULFATE AND AMPHETAMINE SULFATE 2.5; 2.5; 2.5; 2.5 MG/1; MG/1; MG/1; MG/1
CAPSULE, EXTENDED RELEASE ORAL
Qty: 30 CAPSULE | Refills: 0 | Status: SHIPPED | OUTPATIENT
Start: 2024-04-01 | End: 2024-05-23 | Stop reason: SDUPTHER

## 2024-03-11 RX ORDER — DEXTROAMPHETAMINE SACCHARATE, AMPHETAMINE ASPARTATE MONOHYDRATE, DEXTROAMPHETAMINE SULFATE AND AMPHETAMINE SULFATE 2.5; 2.5; 2.5; 2.5 MG/1; MG/1; MG/1; MG/1
CAPSULE, EXTENDED RELEASE ORAL
Qty: 30 CAPSULE | Refills: 0 | Status: SHIPPED | OUTPATIENT
Start: 2024-05-01 | End: 2024-06-12 | Stop reason: SDUPTHER

## 2024-03-11 RX ORDER — DEXTROAMPHETAMINE SACCHARATE, AMPHETAMINE ASPARTATE MONOHYDRATE, DEXTROAMPHETAMINE SULFATE AND AMPHETAMINE SULFATE 7.5; 7.5; 7.5; 7.5 MG/1; MG/1; MG/1; MG/1
30 CAPSULE, EXTENDED RELEASE ORAL EVERY MORNING
Qty: 30 CAPSULE | Refills: 0 | Status: SHIPPED | OUTPATIENT
Start: 2024-05-01 | End: 2024-03-11

## 2024-03-11 RX ORDER — DEXTROAMPHETAMINE SACCHARATE, AMPHETAMINE ASPARTATE MONOHYDRATE, DEXTROAMPHETAMINE SULFATE AND AMPHETAMINE SULFATE 7.5; 7.5; 7.5; 7.5 MG/1; MG/1; MG/1; MG/1
30 CAPSULE, EXTENDED RELEASE ORAL EVERY MORNING
Qty: 30 CAPSULE | Refills: 0 | Status: SHIPPED | OUTPATIENT
Start: 2024-05-01 | End: 2024-06-12 | Stop reason: SDUPTHER

## 2024-03-11 RX ORDER — DEXTROAMPHETAMINE SACCHARATE, AMPHETAMINE ASPARTATE MONOHYDRATE, DEXTROAMPHETAMINE SULFATE AND AMPHETAMINE SULFATE 2.5; 2.5; 2.5; 2.5 MG/1; MG/1; MG/1; MG/1
CAPSULE, EXTENDED RELEASE ORAL
Qty: 30 CAPSULE | Refills: 0 | Status: SHIPPED | OUTPATIENT
Start: 2024-04-01 | End: 2024-03-11

## 2024-03-11 NOTE — PROGRESS NOTES
Subjective     Patient ID: Brice Byrd is a 58 y.o. male.    Chief Complaint: 3m f/u    HPI 58-year-old male presents to clinic today for ADHD follow-up and in order to obtain medication refills.  ADHD remains well controlled on Adderall XR 30 mg daily and Adderall XR 10 mg each afternoon as needed for focus.  He denies any side effects such as headaches, dizziness, chest pain, shortness of breath, palpitations, or difficulty sleeping.  Review of Systems   Constitutional:  Negative for appetite change, chills, fatigue and fever.   HENT:  Negative for nasal congestion, ear pain, hearing loss, postnasal drip, rhinorrhea, sinus pressure/congestion, sore throat and tinnitus.    Eyes:  Negative for redness, itching and visual disturbance.   Respiratory:  Negative for cough, chest tightness and shortness of breath.    Cardiovascular:  Negative for chest pain and palpitations.   Gastrointestinal:  Negative for abdominal pain, constipation, diarrhea, nausea and vomiting.   Genitourinary:  Negative for decreased urine volume, difficulty urinating, dysuria, frequency, hematuria and urgency.   Musculoskeletal:  Negative for back pain, myalgias, neck pain and neck stiffness.   Integumentary:  Negative for rash.   Neurological:  Negative for dizziness, light-headedness and headaches.   Psychiatric/Behavioral: Negative.            Objective     Physical Exam  Vitals and nursing note reviewed.   Constitutional:       General: He is not in acute distress.     Appearance: Normal appearance. He is well-developed. He is not diaphoretic.   HENT:      Head: Normocephalic and atraumatic.      Right Ear: External ear normal.      Left Ear: External ear normal.      Nose: Nose normal.      Mouth/Throat:      Pharynx: No oropharyngeal exudate.   Eyes:      General: No scleral icterus.        Right eye: No discharge.         Left eye: No discharge.      Conjunctiva/sclera: Conjunctivae normal.      Pupils: Pupils are equal, round, and  reactive to light.   Neck:      Thyroid: No thyromegaly.      Vascular: No JVD.      Trachea: No tracheal deviation.   Cardiovascular:      Rate and Rhythm: Normal rate and regular rhythm.      Heart sounds: Normal heart sounds. No murmur heard.     No friction rub. No gallop.   Pulmonary:      Effort: Pulmonary effort is normal. No respiratory distress.      Breath sounds: Normal breath sounds. No stridor. No wheezing, rhonchi or rales.   Chest:      Chest wall: No tenderness.   Abdominal:      General: Bowel sounds are normal. There is no distension.      Palpations: Abdomen is soft. There is no mass.      Tenderness: There is no abdominal tenderness. There is no guarding or rebound.   Musculoskeletal:         General: No tenderness. Normal range of motion.      Cervical back: Normal range of motion and neck supple.   Lymphadenopathy:      Cervical: No cervical adenopathy.   Skin:     General: Skin is warm and dry.      Coloration: Skin is not pale.      Findings: No erythema or rash.   Neurological:      Mental Status: He is alert and oriented to person, place, and time.   Psychiatric:         Mood and Affect: Mood normal.         Behavior: Behavior normal.         Thought Content: Thought content normal.         Judgment: Judgment normal.            Assessment and Plan     1. Follow-up exam, 3-6 months since previous exam    2. Attention deficit hyperactivity disorder (ADHD), combined type  -     Discontinue: dextroamphetamine-amphetamine (ADDERALL XR) 10 MG 24 hr capsule; 1 capsule every afternoon  Dispense: 30 capsule; Refill: 0  -     Discontinue: dextroamphetamine-amphetamine (ADDERALL XR) 10 MG 24 hr capsule; 1 capsule every afternoon  Dispense: 30 capsule; Refill: 0  -     Discontinue: dextroamphetamine-amphetamine (ADDERALL XR) 30 MG 24 hr capsule; Take 1 capsule (30 mg total) by mouth every morning.  Dispense: 30 capsule; Refill: 0  -     Discontinue: dextroamphetamine-amphetamine (ADDERALL XR) 30 MG 24  hr capsule; Take 1 capsule (30 mg total) by mouth every morning.  Dispense: 30 capsule; Refill: 0  -     dextroamphetamine-amphetamine (ADDERALL XR) 30 MG 24 hr capsule; Take 1 capsule (30 mg total) by mouth every morning.  Dispense: 30 capsule; Refill: 0  -     dextroamphetamine-amphetamine (ADDERALL XR) 30 MG 24 hr capsule; Take 1 capsule (30 mg total) by mouth every morning.  Dispense: 30 capsule; Refill: 0  -     dextroamphetamine-amphetamine (ADDERALL XR) 10 MG 24 hr capsule; 1 capsule every afternoon  Dispense: 30 capsule; Refill: 0  -     dextroamphetamine-amphetamine (ADDERALL XR) 10 MG 24 hr capsule; 1 capsule every afternoon  Dispense: 30 capsule; Refill: 0        1.  has been checked and the patient is compliant with medication.  2. Two separate prescriptions of Adderall XR 30 mg each morning and Adderall XR 10 mg each afternoon have been provided to the patient.    3. Return to clinic as needed or in 3 months for follow-up.               Follow up in about 3 months (around 6/11/2024), or if symptoms worsen or fail to improve, for Virtual Visit.

## 2024-04-06 ENCOUNTER — HOSPITAL ENCOUNTER (EMERGENCY)
Facility: HOSPITAL | Age: 59
Discharge: HOME | End: 2024-04-06
Attending: STUDENT IN AN ORGANIZED HEALTH CARE EDUCATION/TRAINING PROGRAM
Payer: OTHER GOVERNMENT

## 2024-04-06 VITALS
OXYGEN SATURATION: 97 % | HEART RATE: 102 BPM | BODY MASS INDEX: 26.66 KG/M2 | TEMPERATURE: 98 F | RESPIRATION RATE: 18 BRPM | WEIGHT: 180 LBS | DIASTOLIC BLOOD PRESSURE: 88 MMHG | HEIGHT: 69 IN | SYSTOLIC BLOOD PRESSURE: 146 MMHG

## 2024-04-06 DIAGNOSIS — J02.9 PHARYNGITIS, UNSPECIFIED ETIOLOGY: ICD-10-CM

## 2024-04-06 DIAGNOSIS — J02.0 STREP PHARYNGITIS: Primary | ICD-10-CM

## 2024-04-06 LAB — GROUP A STREP, MOLECULAR: POSITIVE

## 2024-04-06 PROCEDURE — 99284 EMERGENCY DEPT VISIT MOD MDM: CPT

## 2024-04-06 PROCEDURE — 87651 STREP A DNA AMP PROBE: CPT | Performed by: STUDENT IN AN ORGANIZED HEALTH CARE EDUCATION/TRAINING PROGRAM

## 2024-04-06 RX ORDER — AMOXICILLIN 500 MG/1
500 TABLET, FILM COATED ORAL EVERY 12 HOURS
Qty: 20 TABLET | Refills: 0 | Status: SHIPPED | OUTPATIENT
Start: 2024-04-06 | End: 2024-04-16

## 2024-04-06 RX ORDER — PREDNISONE 50 MG/1
50 TABLET ORAL DAILY
Qty: 5 TABLET | Refills: 0 | Status: SHIPPED | OUTPATIENT
Start: 2024-04-06 | End: 2024-04-11

## 2024-04-06 NOTE — ED PROVIDER NOTES
Encounter Date: 4/6/2024       History     Chief Complaint   Patient presents with    URI     Sinus, sore throat     [age] year male with PMH of hypertension presents with sore throat.  Patient reports a one-week history progressive sore throat associated with generalized fatigue and runny nose.  Patient is confident that this is his allergies that are just worse than normal.  Denies fever, chills, chest pain, shortness of breath, lightheadedness, dizziness.  Denies changes in voice, difficulty his mouth all the way, difficulty managing secretions.  He has not been eating much because of the pain but he has been able to maintain fluid input. ROS otherwise negative.     The history is provided by the patient.     Review of patient's allergies indicates:   Allergen Reactions    Statins-hmg-coa reductase inhibitors Other (See Comments)     nausea, HA, diarrhea, tiredness     Past Medical History:   Diagnosis Date    ADHD (attention deficit hyperactivity disorder)     Anxiety     Hx of psychiatric care     Hypertension     Psychiatric problem     PTSD (post-traumatic stress disorder)     SVT (supraventricular tachycardia)     Therapy      Past Surgical History:   Procedure Laterality Date    ABLATION N/A 02/18/2022    Procedure: Ablation;  Surgeon: Al Guerra MD;  [facility] [facility];  Service: Cardiology;  Laterality: N/A;  SVT, RFA, JERONIMO, anes, MB, 3prep    BACK SURGERY      EPIDURAL STEROID INJECTION Left 05/24/2023    Procedure: Left L4 + L5 Transforaminal Epidural Steroid Injections (ref: [place]);  Surgeon: Al Batres Jr., MD;  Location: [facility] END[facility];  Service: Pain Management;  Laterality: Left;  @1330  No ATC or DM  Needs Consent    EPIDURAL STEROID INJECTION Left 06/14/2023    Procedure: Left L4 Transforaminal Epidural Steroid Injection;  Surgeon: Al Batres Jr., MD;  Location: [facility] ENDO;  Service: Pain Management;  Laterality: Left;  @1230  No ATC or DM  Needs COnsent    LUMBAR  LAMINECTOMY WITH DISCECTOMY N/A 07/12/2023    Procedure: LAMINECTOMY, SPINE, LUMBAR, WITH DISCECTOMY L4-5  JENNIFFER FRAME GUSTAVO RETRACTORS SNS: SSEP/EMG;  Surgeon: Marko Campbell MD;  Location: [facility];  Service: Orthopedics;  Laterality: N/A;    VASECTOMY       History reviewed. No pertinent family history.  Social History     Tobacco Use    Smoking status: Former    Smokeless tobacco: Never   Substance Use Topics    Alcohol use: Never    Drug use: Never     Review of Systems    Physical Exam     Initial Vitals [04/06/24 1420]   BP Pulse Resp Temp SpO2   (!) 158/91 104 16 97.9 °F (36.6 °C) 99 %      MAP       --         Physical Exam    Nursing note and vitals reviewed.  Constitutional: He appears well-developed and well-nourished. He is not diaphoretic. No distress.   HENT:   Head: Normocephalic and atraumatic.   Bilateral swollen, erythematous tonsils with exudate. Uvula midline   Eyes: Conjunctivae and EOM are normal. Pupils are equal, round, and reactive to light.   Neck: Neck supple.   Normal range of motion.  Cardiovascular:  Normal rate, regular rhythm, normal heart sounds and intact distal pulses.           No murmur heard.  Pulmonary/Chest: Breath sounds normal. No respiratory distress. He has no wheezes. He has no rhonchi. He has no rales.   Abdominal: Abdomen is soft. He exhibits no distension. There is no abdominal tenderness. There is no rebound and no guarding.   Musculoskeletal:         General: No tenderness or edema.      Cervical back: Normal range of motion and neck supple.     Neurological: He is alert and oriented to person, place, and time.   Skin: Skin is warm and dry. Capillary refill takes less than 2 seconds.         ED Course   Procedures  Labs Reviewed   GROUP A STREP, MOLECULAR - Abnormal; Notable for the following components:       Result Value    Group A Strep, Molecular Positive (*)     All other components within normal limits          Imaging Results    None          Medications -  No data to display  Medical Decision Making  Differential diagnoses considered includes strep pharyngitis, other bacterial viral pharyngitis, PTA    I considered CT to assess for peritonsillar abscess, but patient doesn't have signs/symptoms of complication, so I did not obtain. Rapid strep was positive. Will rx amoxicillin and prednisone. Pt encouraged to use OTC tylenol and ibuprofen for pain relief. Patient will be discharged at this time. Patient has been given home care instructions, follow up instructions, and strict return precautions. They agree with and are comfortable with the plan.     Amount and/or Complexity of Data Reviewed  Labs: ordered.    Risk  OTC drugs.  Prescription drug management.                                      Clinical Impression:  Final diagnoses:  [J02.9] Pharyngitis, unspecified etiology  [J02.0] Strep pharyngitis (Primary)          ED Disposition Condition    Discharge Stable          ED Prescriptions       Medication Sig Dispense Start Date End Date Auth. Provider    predniSONE (DELTASONE) 50 MG Tab Take 1 tablet (50 mg total) by mouth once daily. for 5 days 5 tablet 4/6/2024 4/11/2024 Ricardo Flores MD    amoxicillin (AMOXIL) 500 MG Tab Take 1 tablet (500 mg total) by mouth every 12 (twelve) hours. for 10 days 20 tablet 4/6/2024 4/16/2024 Ricardo Folres MD          Follow-up Information       Follow up With Specialties Details Why Contact Info    Puma Solomon MD Family Medicine Schedule an appointment as soon as possible for a visit  To discuss your recent ER visit and any additional concerns that you may have [address] [facility] [address]  [phone]      [facility] - Emergency Dept Emergency Medicine Go to  As needed, If symptoms worsen [address] [facility] [address] [phone]             Ricardo Flores MD  04/06/24 2762

## 2024-04-06 NOTE — DISCHARGE INSTRUCTIONS
It was a pleasure taking care of you today!     Diagnosis: Strep Pharyngitis (Strep Throat)    Home Care:   - Take Amoxicillin twice daily for the next 10 days  - Tylenol 1000mg every 8 hours and Ibuprofen 400mg every 4-6 hours as needed for pain/fever/body aches    Follow-Up Plan:  - Follow-up with primary care doctor within 3 - 5 days to discuss your recent ER visit and any additional concerns that you may have.  - Additional testing and/or evaluation as directed by your primary doctor    Return to the Emergency Department for symptoms including but not limited to: persistence or worsening of symptoms, inability to drink, changes in voice, choking on saliva/liquids, shortness of breath or chest pain, inability to drink without vomiting, passing out/fainting/ loss of consciousness, or if you have other concerns.

## 2024-04-06 NOTE — ED NOTES
LOC: The patient is awake and alert; oriented x 3 and speaking appropriately.  APPEARANCE: Patient resting comfortably, patient is clean and well groomed  SKIN: warm and dry, normal skin turgor & moist mucus membranes, skin intact, no breakdown noted.  MUSCULOSKELETAL: Patient moving all extremities well, no obvious swelling or deformities noted  RESPIRATORY: Airway is open and patent,  respirations are spontaneous, normal effort and rate  CARDIAC: Patient has a normal rate, no peripheral edema noted, capillary refill < 3 seconds; No complaints of chest pain   ABDOMEN: Soft and non tender to palpation, no distention noted.

## 2024-04-06 NOTE — ED TRIAGE NOTES
C/o seasonal allergies w/ nasal  drainage and sore throat for two wks.   Sleeping sitting up b/c nasal drainage. States his tonsils feels swollen .Swallowing w/ out difficulty , voice not muffled.

## 2024-05-23 DIAGNOSIS — F90.2 ATTENTION DEFICIT HYPERACTIVITY DISORDER (ADHD), COMBINED TYPE: ICD-10-CM

## 2024-05-23 RX ORDER — DEXTROAMPHETAMINE SACCHARATE, AMPHETAMINE ASPARTATE MONOHYDRATE, DEXTROAMPHETAMINE SULFATE AND AMPHETAMINE SULFATE 2.5; 2.5; 2.5; 2.5 MG/1; MG/1; MG/1; MG/1
CAPSULE, EXTENDED RELEASE ORAL
Qty: 30 CAPSULE | Refills: 0 | Status: SHIPPED | OUTPATIENT
Start: 2024-05-23 | End: 2024-06-12 | Stop reason: SDUPTHER

## 2024-05-23 NOTE — TELEPHONE ENCOUNTER
----- Message from Frankyjamilah Sadi sent at 5/23/2024 11:10 AM CDT -----  Contact: [phone]  Requesting an RX refill or new RX.  Is this a refill or new RX: refill  RX name and strength (copy/paste from chart):   dextroamphetamine-amphetamine (ADDERALL XR) 10 MG 24 hr capsule  Is this a 30 day or 90 day RX:   Pharmacy name and phone # (copy/paste from chart):        [facility] [address]  [address]  [place] [address]  Phone: [phone] Fax: [phone]         The doctors have asked that we provide their patients with the following 2 reminders -- prescription refills can take up to 72 hours, and a friendly reminder that in the future you can use your [facility] account to request refills:  call back

## 2024-05-23 NOTE — TELEPHONE ENCOUNTER
No care due was identified.  [facility] Embedded Care Due Messages. Reference number: [id number].   5/23/2024 11:25:15 AM CDT

## 2024-06-12 ENCOUNTER — OFFICE VISIT (OUTPATIENT)
Dept: INTERNAL MEDICINE | Facility: CLINIC | Age: 59
End: 2024-06-12
Payer: OTHER GOVERNMENT

## 2024-06-12 DIAGNOSIS — I10 PRIMARY HYPERTENSION: ICD-10-CM

## 2024-06-12 DIAGNOSIS — Z09 FOLLOW-UP EXAM, 3-6 MONTHS SINCE PREVIOUS EXAM: Primary | ICD-10-CM

## 2024-06-12 DIAGNOSIS — F90.2 ATTENTION DEFICIT HYPERACTIVITY DISORDER (ADHD), COMBINED TYPE: ICD-10-CM

## 2024-06-12 PROCEDURE — 99213 OFFICE O/P EST LOW 20 MIN: CPT | Mod: 95,,, | Performed by: FAMILY MEDICINE

## 2024-06-12 PROCEDURE — G2211 COMPLEX E/M VISIT ADD ON: HCPCS | Mod: 95,,, | Performed by: FAMILY MEDICINE

## 2024-06-12 RX ORDER — TELMISARTAN 80 MG/1
80 TABLET ORAL DAILY
Qty: 90 TABLET | Refills: 3 | Status: SHIPPED | OUTPATIENT
Start: 2024-06-12 | End: 2025-06-12

## 2024-06-12 RX ORDER — DEXTROAMPHETAMINE SACCHARATE, AMPHETAMINE ASPARTATE MONOHYDRATE, DEXTROAMPHETAMINE SULFATE AND AMPHETAMINE SULFATE 2.5; 2.5; 2.5; 2.5 MG/1; MG/1; MG/1; MG/1
CAPSULE, EXTENDED RELEASE ORAL
Qty: 30 CAPSULE | Refills: 0 | Status: SHIPPED | OUTPATIENT
Start: 2024-07-12

## 2024-06-12 RX ORDER — DEXTROAMPHETAMINE SACCHARATE, AMPHETAMINE ASPARTATE MONOHYDRATE, DEXTROAMPHETAMINE SULFATE AND AMPHETAMINE SULFATE 7.5; 7.5; 7.5; 7.5 MG/1; MG/1; MG/1; MG/1
30 CAPSULE, EXTENDED RELEASE ORAL EVERY MORNING
Qty: 30 CAPSULE | Refills: 0 | Status: SHIPPED | OUTPATIENT
Start: 2024-06-12

## 2024-06-12 RX ORDER — DEXTROAMPHETAMINE SACCHARATE, AMPHETAMINE ASPARTATE MONOHYDRATE, DEXTROAMPHETAMINE SULFATE AND AMPHETAMINE SULFATE 2.5; 2.5; 2.5; 2.5 MG/1; MG/1; MG/1; MG/1
CAPSULE, EXTENDED RELEASE ORAL
Qty: 30 CAPSULE | Refills: 0 | Status: SHIPPED | OUTPATIENT
Start: 2024-08-11

## 2024-06-12 RX ORDER — DEXTROAMPHETAMINE SACCHARATE, AMPHETAMINE ASPARTATE MONOHYDRATE, DEXTROAMPHETAMINE SULFATE AND AMPHETAMINE SULFATE 2.5; 2.5; 2.5; 2.5 MG/1; MG/1; MG/1; MG/1
CAPSULE, EXTENDED RELEASE ORAL
Qty: 30 CAPSULE | Refills: 0 | Status: SHIPPED | OUTPATIENT
Start: 2024-06-12

## 2024-06-12 RX ORDER — DEXTROAMPHETAMINE SACCHARATE, AMPHETAMINE ASPARTATE MONOHYDRATE, DEXTROAMPHETAMINE SULFATE AND AMPHETAMINE SULFATE 7.5; 7.5; 7.5; 7.5 MG/1; MG/1; MG/1; MG/1
30 CAPSULE, EXTENDED RELEASE ORAL EVERY MORNING
Qty: 30 CAPSULE | Refills: 0 | Status: SHIPPED | OUTPATIENT
Start: 2024-08-11

## 2024-06-12 RX ORDER — DEXTROAMPHETAMINE SACCHARATE, AMPHETAMINE ASPARTATE MONOHYDRATE, DEXTROAMPHETAMINE SULFATE AND AMPHETAMINE SULFATE 7.5; 7.5; 7.5; 7.5 MG/1; MG/1; MG/1; MG/1
30 CAPSULE, EXTENDED RELEASE ORAL EVERY MORNING
Qty: 30 CAPSULE | Refills: 0 | Status: SHIPPED | OUTPATIENT
Start: 2024-07-12

## 2024-06-12 NOTE — PROGRESS NOTES
Subjective     Patient ID: Brice Byrd is a 58 y.o. male.    Chief Complaint: ADHD    The patient location is:  [place]  The chief complaint leading to consultation is:  ADHD/medication refill    Visit type: audiovisual    Face to Face time with patient:  6 minutes  13 minutes of total time spent on the encounter, which includes face to face time and non-face to face time preparing to see the patient (eg, review of tests), Obtaining and/or reviewing separately obtained history, Documenting clinical information in the electronic or other health record, Independently interpreting results (not separately reported) and communicating results to the patient/family/caregiver, or Care coordination (not separately reported).         Each patient to whom he or she provides medical services by telemedicine is:  (1) informed of the relationship between the physician and patient and the respective role of any other health care provider with respect to management of the patient; and (2) notified that he or she may decline to receive medical services by telemedicine and may withdraw from such care at any time.    Notes:  58-year-old male is evaluated through telemedicine visit for ADHD follow-up.  He continues to remain stable on Adderall XR 30 mg daily and Adderall XR 10 mg each afternoon as needed for concentration.  He denies any side effects such as headaches, dizziness, chest pain, shortness of breath, palpitations, or difficulty sleeping.  Secondarily, he requires a refill of valsartan for treatment of hypertension.    Review of Systems   Constitutional:  Negative for appetite change, chills, fatigue and fever.   HENT:  Negative for nasal congestion, ear pain, hearing loss, postnasal drip, rhinorrhea, sinus pressure/congestion, sore throat and tinnitus.    Eyes:  Negative for redness, itching and visual disturbance.   Respiratory:  Negative for cough, chest tightness and shortness of breath.    Cardiovascular:   Negative for chest pain and palpitations.   Gastrointestinal:  Negative for abdominal pain, constipation, diarrhea, nausea and vomiting.   Genitourinary:  Negative for decreased urine volume, difficulty urinating, dysuria, frequency, hematuria and urgency.   Musculoskeletal:  Negative for back pain, myalgias, neck pain and neck stiffness.   Integumentary:  Negative for rash.   Neurological:  Negative for dizziness, light-headedness and headaches.   Psychiatric/Behavioral: Negative.            Objective     Physical Exam  Constitutional:       Appearance: Normal appearance.   HENT:      Head: Normocephalic and atraumatic.   Pulmonary:      Effort: Pulmonary effort is normal.   Neurological:      Mental Status: He is alert and oriented to person, place, and time.   Psychiatric:         Mood and Affect: Mood normal.         Behavior: Behavior normal.         Thought Content: Thought content normal.         Judgment: Judgment normal.            Assessment and Plan     1. Follow-up exam, 3-6 months since previous exam    2. Attention deficit hyperactivity disorder (ADHD), combined type  -     dextroamphetamine-amphetamine (ADDERALL XR) 10 MG 24 hr capsule; 1 capsule every afternoon  Dispense: 30 capsule; Refill: 0  -     dextroamphetamine-amphetamine (ADDERALL XR) 10 MG 24 hr capsule; 1 capsule every afternoon  Dispense: 30 capsule; Refill: 0  -     dextroamphetamine-amphetamine (ADDERALL XR) 10 MG 24 hr capsule; 1 capsule every afternoon  Dispense: 30 capsule; Refill: 0  -     dextroamphetamine-amphetamine (ADDERALL XR) 30 MG 24 hr capsule; Take 1 capsule (30 mg total) by mouth every morning.  Dispense: 30 capsule; Refill: 0  -     dextroamphetamine-amphetamine (ADDERALL XR) 30 MG 24 hr capsule; Take 1 capsule (30 mg total) by mouth every morning.  Dispense: 30 capsule; Refill: 0  -     dextroamphetamine-amphetamine (ADDERALL XR) 30 MG 24 hr capsule; Take 1 capsule (30 mg total) by mouth every morning.  Dispense: 30  capsule; Refill: 0    3. Primary hypertension  -     telmisartan (MICARDIS) 80 MG Tab; Take 1 tablet (80 mg total) by mouth once daily.  Dispense: 90 tablet; Refill: 3        1.  has been checked and the patient is compliant with medication.  2. Three separate prescriptions of Adderall XR 30 mg each morning and Adderall IR 10 mg each afternoon as needed for concentration have been sent to the patient's pharmacy.  3. Refill of telmisartan provided.    4. Return to clinic as needed or in 3 months for ADHD follow-up.               Follow up in about 3 months (around 9/12/2024), or if symptoms worsen or fail to improve, for Virtual Visit.

## 2024-08-08 ENCOUNTER — LAB VISIT (OUTPATIENT)
Dept: LAB | Facility: HOSPITAL | Age: 59
End: 2024-08-08
Payer: OTHER GOVERNMENT

## 2024-08-08 DIAGNOSIS — K76.0 FATTY LIVER: ICD-10-CM

## 2024-08-08 DIAGNOSIS — R74.01 TRANSAMINITIS: ICD-10-CM

## 2024-08-08 LAB
ALBUMIN SERPL BCP-MCNC: 4 G/DL (ref 3.5–5.2)
ALP SERPL-CCNC: 120 U/L (ref 55–135)
ALT SERPL W/O P-5'-P-CCNC: 95 U/L (ref 10–44)
AST SERPL-CCNC: 68 U/L (ref 10–40)
BILIRUB DIRECT SERPL-MCNC: 0.2 MG/DL (ref 0.1–0.3)
BILIRUB SERPL-MCNC: 0.4 MG/DL (ref 0.1–1)
PROT SERPL-MCNC: 6.9 G/DL (ref 6–8.4)

## 2024-08-08 PROCEDURE — 80076 HEPATIC FUNCTION PANEL: CPT | Performed by: NURSE PRACTITIONER

## 2024-08-08 PROCEDURE — 36415 COLL VENOUS BLD VENIPUNCTURE: CPT | Performed by: NURSE PRACTITIONER

## 2024-08-15 ENCOUNTER — OFFICE VISIT (OUTPATIENT)
Dept: HEPATOLOGY | Facility: CLINIC | Age: 59
End: 2024-08-15
Payer: OTHER GOVERNMENT

## 2024-08-15 ENCOUNTER — PROCEDURE VISIT (OUTPATIENT)
Dept: HEPATOLOGY | Facility: CLINIC | Age: 59
End: 2024-08-15
Payer: OTHER GOVERNMENT

## 2024-08-15 VITALS — BODY MASS INDEX: 27.24 KG/M2 | HEIGHT: 69 IN | WEIGHT: 183.88 LBS

## 2024-08-15 DIAGNOSIS — R74.01 TRANSAMINITIS: ICD-10-CM

## 2024-08-15 DIAGNOSIS — K76.0 METABOLIC DYSFUNCTION-ASSOCIATED STEATOTIC LIVER DISEASE (MASLD): Primary | ICD-10-CM

## 2024-08-15 DIAGNOSIS — E66.3 OVERWEIGHT (BMI 25.0-29.9): ICD-10-CM

## 2024-08-15 DIAGNOSIS — I10 PRIMARY HYPERTENSION: ICD-10-CM

## 2024-08-15 DIAGNOSIS — E78.2 MIXED HYPERLIPIDEMIA: ICD-10-CM

## 2024-08-15 DIAGNOSIS — K76.0 FATTY LIVER: ICD-10-CM

## 2024-08-15 PROCEDURE — 99213 OFFICE O/P EST LOW 20 MIN: CPT | Mod: PBBFAC,25 | Performed by: NURSE PRACTITIONER

## 2024-08-15 PROCEDURE — 99214 OFFICE O/P EST MOD 30 MIN: CPT | Mod: S$PBB,,, | Performed by: NURSE PRACTITIONER

## 2024-08-15 PROCEDURE — 91200 LIVER ELASTOGRAPHY: CPT | Mod: PBBFAC | Performed by: NURSE PRACTITIONER

## 2024-08-15 PROCEDURE — 99999 PR PBB SHADOW E&M-EST. PATIENT-LVL III: CPT | Mod: PBBFAC,,, | Performed by: NURSE PRACTITIONER

## 2024-08-15 NOTE — PROGRESS NOTES
[facility] UP [facility]    PCP: Puma Solomon MD     CHIEF COMPLAINT: elevated liver enzymes, Metabolic associated steatotic liver disease       HPI: This is a 59 y.o.      White male with PMH noted below, presenting for follow up of above    Serological workup was negative for Won's, alpha-1 antitrypsin deficiency, hemochromatosis, autoimmune etiology, and viral hepatitis.   PETH negative    Prior serologic workup:   Lab Results   Component Value Date    SMOOTHMUSCAB Negative 1:40 06/30/2022    AMAIFA Negative 1:40 06/30/2022    IGGSERUM 1030 06/30/2022    ANASCREEN Negative <1:80 06/30/2022    FERRITIN 179 06/30/2022    FESATURATED 21 06/30/2022    CERULOPLSM 29.0 06/30/2022    HEPBSAG Negative 06/30/2022    HEPCAB Negative 06/30/2022    HEPAIGM Negative 06/30/2022     Liver fibrosis staging:  -- fibroscan 7/2022 noted F0, S3 (kPA 6.3, )  -- f ibroscan 8/2023 noted F0, S3 (kPA 5.6, )  -- fibrosan 8/2024 noted F0, S2 (kPA 4.8, )    Risk factors for fatty liver include overweight, HTN, HLD    Interval HPI: Presents today alone. No Herbal medications, No New medications or med changes.   Trying to limit portions - improved steatosis on fibroscan  No SSB, rare fried food  Current wt 183 lbs, no significant weight change since last visit   Taking NSAIDs chronically but does not exceed max daily dose       Labs done 8/2024 show elevated transaminase levels (ALT > AST, elevated since at least 11/2021)  Platelets WNL, alk phos WNL  Synthetic liver functioning WNL    Lab Results   Component Value Date    ALT 95 (H) 08/08/2024    AST 68 (H) 08/08/2024    ALKPHOS 120 08/08/2024    BILITOT 0.4 08/08/2024    ALBUMIN 4.0 08/08/2024    INR 0.9 06/06/2023     12/14/2023     Abd U/S done 7/2022 noted hepatomegaly, fatty liver - can repeat in 2026    Denies family history of liver disease . Denies alcohol consumption currently or in the past    + Immunity to Hep A and  "B        Allergy and medication list reviewed and updated     PMHX:  has a past medical history of ADHD (attention deficit hyperactivity disorder), Anxiety, psychiatric care, Hypertension, Psychiatric problem, PTSD (post-traumatic stress disorder), SVT (supraventricular tachycardia), and Therapy.    PSHX:  has a past surgical history that includes Vasectomy; Ablation (N/A, 02/18/2022); Epidural steroid injection (Left, 05/24/2023); Epidural steroid injection (Left, 06/14/2023); Lumbar laminectomy with discectomy (N/A, 07/12/2023); and Back surgery.    FAMILY HISTORY: Updated and reviewed in EPIC    SOCIAL HISTORY:   Social History     Substance and Sexual Activity   Alcohol Use Never       Social History     Substance and Sexual Activity   Drug Use Never       ROS:   GENERAL: Denies fatigue  CARDIOVASCULAR: Denies edema  GI: Denies abdominal pain  SKIN: Denies rash, itching   NEURO: Denies confusion, memory loss, or mood changes    PHYSICAL EXAM:   Friendly male, in no acute distress; alert and oriented to person, place and time  VITALS: Ht 5' 9" (1.753 m)   Wt 83.4 kg (183 lb 13.8 oz)   BMI 27.15 kg/m²   EYES: Sclerae anicteric  GI: Soft, non-tender, non-distended. No ascites.  EXTREMITIES:  No edema.  SKIN: Warm and dry. No jaundice. No telangectasias noted. No palmar erythema.  NEURO:  No asterixis.  PSYCH:  Thought and speech pattern appropriate. Behavior normal      EDUCATION:  See instructions discussed with patient in Instructions section of the After Visit Summary     ASSESSMENT & PLAN:  59 y.o.      White male with:  1. Metabolic associated steatotic liver disease   - see HPI  -- Fibroscan 8/2024 noted F0, S3 - will repeat yearly   -- Recommendations discussed with patient:  Limit alcohol consumption  2 Weight loss goal of 10 lbs  3. Low carb/sugar, high fiber and protein diet, limit your carb intake to LESS than 30-45 grams of carbs with a meal or LESS than 5-10 grams with any snack   4. Exercise, 5 " days per week, 30 minutes per day, as tolerated  5. Recommend good cholesterol, blood pressure, blood sugar levels   6. No indication for Rezdiffra, no fibrosis     2. Elevated liver enzymes  -- likely due to fatty liver, sero w/u negative    3. Overweight, HTN, HLD  -- Body mass index is 27.15 kg/m².   -- increases risk for fatty liver        Follow up in about 1 year (around 8/15/2025). with labs and fibroscan before  Orders Placed This Encounter   Procedures    FibroScan Transplant Hepatology(Vibration Controlled Transient Elastography)    Hepatic Function Panel        Thank you for allowing me to participate in the care of CORRIE Novoa    I spent a total of 30 minutes on the day of the visit.This includes face to face time and non-face to face time preparing to see the patient (eg, review of tests), obtaining and/or reviewing separately obtained history, documenting clinical information in the electronic or other health record, independently interpreting results and communicating results to the patient/family/caregiver, and coordinating care.         CC'ed note to:

## 2024-08-15 NOTE — PROCEDURES
FibroScan [place] (Vibration Controlled Transient Elastography)    Date/Time: 8/15/2024 9:15 AM    Performed by: Neeru Luna NP  Authorized by: Neeru Luna, MATT    Diagnosis:  NAFLD    Probe:  M    Universal Protocol: Patient's identity, procedure and site were verified, confirmatory pause was performed.  Discussed procedure including risks and potential complications.  Questions answered.  Patient verbalizes understanding and wishes to proceed with VCTE.     Procedure: After providing explanations of the procedure, patient was placed in the supine position with right arm in maximum abduction to allow optimal exposure of right lateral abdomen.  Patient was briefly assessed, Testing was performed in the mid-axillary location, 50Hz Shear Wave pulses were applied and the resulting Shear Wave and Propagation Speed detected with a 3.5 MHz ultrasonic signal, using the FibroScan probe, Skin to liver capsule distance and liver parenchyma were accessed during the entire examination with the FibroScan probe, Patient was instructed to breathe normally and to abstain from sudden movements during the procedure, allowing for random measurements of liver stiffness. At least 10 Shear Waves were produced, Individual measurements of each Shear Wave were calculated.  Patient tolerated the procedure well with no complications.  Meets discharge criteria as was dismissed.  Rates pain 0 out of 10.  Patient will follow up with ordering provider to review results.    Findings  Median liver stiffness score:  4.8  CAP Reading: dB/m:  282    IQR/med %:  16  Interpretation  Fibrosis interpretation is based on medial liver stiffness - Kilopascal (kPa).    Fibrosis Stage:  F 0-1  Steatosis interpretation is based on controlled attenuation parameter - (dB/m).    Steatosis Grade:  S2

## 2024-08-15 NOTE — PATIENT INSTRUCTIONS
1. Fibroscan to look for fat or scar tissue in the liver showed ~60% fat in the liver, no scar tissue damage   2. Follow up in 1 year with labs and fibroscan before    These things are important to improve fatty liver:  Limit alcohol consumption  2 Weight loss goal of 10 lbs. [facility] offers benefits to patients so let me know if you want a referral to the [facility] gym. Also, [facility] has dieticians that can create a weight loss plan. If you are interested let me know and I can place a referral. If so, contact the dietician team at [facility] at email [email or web address] or call [phone] to get scheduled. They do offer video visits   3. Avoid processed foods. No fried/fast foods. No sugary drinks or drinks with any calories.   4. Low carb/sugar and high protein diet (~1 g/kg/day of protein).Try to limit your carb intake to LESS than 30-45 grams of carbs with a meal or LESS than 5-10 grams with any snack (total of any snack foods eaten during that time). Use TAPQUAD Pal or Lose It daniel to add up your carbs through the day. Do NOT drink any beverages with calories or carbs (this can lead to high blood sugar and weight gain). The main thing to focus on is high protein, low carb)  5. Exercise, 5 days per week, 30 minutes per day, as tolerated  6. Recommend good cholesterol, blood pressure, blood sugar levels   7. There is a new medication called Rezdiffra for the treatment of F2-F3 liver scarring due to fatty liver. It is only indicated for patients with significant scar tissue from fatty liver (not you currently)    In some people, fatty liver can progress to steatohepatitis (inflamatory fatty liver) and possibly to cirrhosis, increasing the risk for liver cancer, liver failure, and death. Therefore, the lifestyle changes are very important to decrease this risk.

## 2024-08-16 ENCOUNTER — OFFICE VISIT (OUTPATIENT)
Dept: ORTHOPEDICS | Facility: CLINIC | Age: 59
End: 2024-08-16
Payer: OTHER GOVERNMENT

## 2024-08-16 VITALS — BODY MASS INDEX: 27.24 KG/M2 | HEIGHT: 69 IN | WEIGHT: 183.88 LBS

## 2024-08-16 DIAGNOSIS — M18.0 OSTEOARTHRITIS OF CARPOMETACARPAL (CMC) JOINTS OF BOTH THUMBS, UNSPECIFIED OSTEOARTHRITIS TYPE: ICD-10-CM

## 2024-08-16 DIAGNOSIS — M65.352 TRIGGER LITTLE FINGER OF LEFT HAND: ICD-10-CM

## 2024-08-16 DIAGNOSIS — M65.312 TRIGGER THUMB OF LEFT HAND: ICD-10-CM

## 2024-08-16 DIAGNOSIS — G56.02 CARPAL TUNNEL SYNDROME ON LEFT: ICD-10-CM

## 2024-08-16 DIAGNOSIS — G56.23 CUBITAL TUNNEL SYNDROME, BILATERAL: ICD-10-CM

## 2024-08-16 DIAGNOSIS — M79.642 BILATERAL HAND PAIN: Primary | ICD-10-CM

## 2024-08-16 DIAGNOSIS — M79.641 BILATERAL HAND PAIN: Primary | ICD-10-CM

## 2024-08-16 PROCEDURE — 99213 OFFICE O/P EST LOW 20 MIN: CPT | Mod: PBBFAC,PN

## 2024-08-16 PROCEDURE — 99999 PR PBB SHADOW E&M-EST. PATIENT-LVL III: CPT | Mod: PBBFAC,,,

## 2024-08-16 RX ADMIN — TRIAMCINOLONE ACETONIDE 20 MG: 40 INJECTION, SUSPENSION INTRA-ARTICULAR; INTRAMUSCULAR at 11:08

## 2024-08-16 NOTE — PROGRESS NOTES
SUBJECTIVE:      Chief Complaint: Hand Pain (Chris pain)      History of Present Illness:  Patient is a RHD 59 y.o. who presents today with complaints of bilateral hand pain.   Patient reports multiyear history of bilateral hand pain including CTS, cubital tunnel, trigger finger, and CMC OA, for which he has previously been treated with CSI, although unclear which diagnoses were treated with CSI  Pain has worsened over the past 2 weeks.  No history of trauma   Pain is intermittent located to diffuse bilateral hands and described as aching, sharp, stiffness, throbbing, and numbness and tingling.  Symptoms are aggravated by activity.  Symptoms are alleviated by rest.  Attempted treatment(s) and/or interventions include rest, bracing, NSAIDs, Tylenol, CSI without adequate response.    Is affecting ADLs and limiting desired level of activity. Denies neck pain, numbness, and tingling in fingertips.    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    Past Medical History:   Diagnosis Date    ADHD (attention deficit hyperactivity disorder)     Anxiety     Hx of psychiatric care     Hypertension     Psychiatric problem     PTSD (post-traumatic stress disorder)     SVT (supraventricular tachycardia)     Therapy      Past Surgical History:   Procedure Laterality Date    ABLATION N/A 02/18/2022    Procedure: Ablation;  Surgeon: Al Guerra MD;  [facility] LAB;  Service: Cardiology;  Laterality: N/A;  SVT, RFA, JERONIMO, anes, MB, 3prep    BACK SURGERY      EPIDURAL STEROID INJECTION Left 05/24/2023    Procedure: Left L4 + L5 Transforaminal Epidural Steroid Injections (ref: [place]);  Surgeon: Al Batres Jr., MD;  Location: [facility] ENDO;  Service: Pain Management;  Laterality: Left;  @1330  No ATC or DM  Needs Consent    EPIDURAL STEROID INJECTION Left 06/14/2023    Procedure: Left L4 Transforaminal Epidural Steroid  Injection;  Surgeon: Al Batres Jr., MD;  Location: [facility] ENDO;  Service: Pain Management;  Laterality: Left;  @1230  No ATC or DM  Needs COnsent    LUMBAR LAMINECTOMY WITH DISCECTOMY N/A 07/12/2023    Procedure: LAMINECTOMY, SPINE, LUMBAR, WITH DISCECTOMY L4-5  JENNIFFER FRAME GUSTAVO RETRACTORS SNS: SSEP/EMG;  Surgeon: Marko Campbell MD;  Location: [facility] OR;  Service: Orthopedics;  Laterality: N/A;    VASECTOMY       Review of patient's allergies indicates:   Allergen Reactions    Statins-hmg-coa reductase inhibitors Other (See Comments)     nausea, HA, diarrhea, tiredness     Social History     Social History Narrative    Not on file     No family history on file.      Current Outpatient Medications:     chlorthalidone (HYGROTEN) 25 MG Tab, Take 1 tablet (25 mg total) by mouth once daily., Disp: 90 tablet, Rfl: 3    dextroamphetamine-amphetamine (ADDERALL XR) 10 MG 24 hr capsule, 1 capsule every afternoon, Disp: 30 capsule, Rfl: 0    dextroamphetamine-amphetamine (ADDERALL XR) 10 MG 24 hr capsule, 1 capsule every afternoon, Disp: 30 capsule, Rfl: 0    dextroamphetamine-amphetamine (ADDERALL XR) 10 MG 24 hr capsule, 1 capsule every afternoon, Disp: 30 capsule, Rfl: 0    dextroamphetamine-amphetamine (ADDERALL XR) 30 MG 24 hr capsule, Take 1 capsule (30 mg total) by mouth every morning., Disp: 30 capsule, Rfl: 0    dextroamphetamine-amphetamine (ADDERALL XR) 30 MG 24 hr capsule, Take 1 capsule (30 mg total) by mouth every morning., Disp: 30 capsule, Rfl: 0    dextroamphetamine-amphetamine (ADDERALL XR) 30 MG 24 hr capsule, Take 1 capsule (30 mg total) by mouth every morning., Disp: 30 capsule, Rfl: 0    evolocumab (REPATHA SURECLICK) 140 mg/mL PnIj, Inject 1 mL (140 mg total) into the skin every 14 (fourteen) days., Disp: 6 mL, Rfl: 3    telmisartan (MICARDIS) 80 MG Tab, Take 1 tablet (80 mg total) by mouth once daily., Disp: 90 tablet, Rfl: 3    sildenafiL (VIAGRA) 100 MG tablet, Take 1 tablet (100 mg total)  "by mouth daily as needed for Erectile Dysfunction., Disp: 30 tablet, Rfl: 11    Current Facility-Administered Medications:     betamethasone acetate-betamethasone sodium phosphate injection 6 mg, 6 mg, Intramuscular, 1 time in Clinic/HOD, Venkata Cade MD    Review of Systems   Musculoskeletal:  Positive for arthritis, joint pain, joint swelling, muscle weakness, myalgias and stiffness.   Neurological:  Positive for numbness and paresthesias.       OBJECTIVE:      Vital Signs (Most Recent):  Vitals:    08/16/24 1140   Weight: 83.4 kg (183 lb 13.8 oz)   Height: 5' 9" (1.753 m)     Body mass index is 27.15 kg/m².      Physical Exam:  Bilateral Hand/Wrist Examination:  Observation/Inspection:  Swelling  mild bilaterally    Deformity  none  Discoloration  none     Scars   none    Atrophy  none  Strength  4/5  TTP   left A1 pulley of the thumb and small fingers    ROM hand full, painless  ROM wrist full, painless  ROM elbow full, painless    HAND/WRIST EXAMINATION:  Finkelstein's Test   Neg  WHAT Test    Neg  Snuff box tenderness   Neg  Kwon's Test    Neg  Hook of [place]ate Tenderness  Neg  CMC grind    positive and left hand  CMC crepitus    positive bilaterally  Circumduction test   Neg  TFCC Compression Test    Neg     Neurovascular Exam:  Digits WWP, brisk CR < 3s throughout  NVI motor/LTS to M/R/U nerves, radial pulse 2+  Tinel's Test - Carpal Tunnel  positive left hand  Tinel's Test - Cubital Tunnel  positive bilaterally (R>L)  Phalen's Test    positive left hand  Durkan's Test    positive to left hand  Median Nerve Compression Test positive left hand  Table top Test    Neg    Diagnostic Results:  Imaging - none  ASSESSMENT/PLAN:      1. Bilateral hand pain    2. Osteoarthritis of carpometacarpal (CMC) joints of both thumbs, unspecified osteoarthritis type    3. Carpal tunnel syndrome on left    4. Cubital tunnel syndrome, bilateral    5. Trigger thumb of left hand    6. Trigger little finger of left hand  "       59 y.o. y/o male with cubital tunnel, suspected bilateral CMC OA, trigger finger to the left thumb and small finger, and CTS to left hand  Plan: The patient and I had a thorough discussion today.  We discussed the working diagnosis as well as several other potential alternative diagnoses.    We discussed conservative and surgical treatment options. Conservative options include rest, activity modifications, workplace modifications, po and topical analgesia (OTC and rx), formal or home PT, and others. Surgical treatment was also mentioned.    At this time, the patient would like to proceed with the following, which I agree with.    Medications: OTC analgesia prn for pain. Precautions advised. Pt expressed understanding  Imaging:  EMG ordered today for bilateral upper extremities.  We will need bilateral hand x-ray at follow-up appointment  Procedures:  CSI given today for left trigger thumb.  We will consider CSI for CMC OA and CTS at follow-up appointments pending x-ray results an EMG results  Work status:  WBAT with limitation secondary to pain  Follow up:  To discuss EMG results  with Bobbi Drake PA-C.  We will need bilateral hand x-rays prior to appointment    All of the patient's questions were answered and the patient will contact us if they have any questions or concerns in the interim.        Bobbi Drake PA-C  [facility]  Orthopedic Surgery              I spent a total of 40 minutes on the day of the visit.  This includes face to face time and non-face to face time preparing to see the patient (eg, review of tests), obtaining and/or reviewing separately obtained history, documenting clinical information in the electronic or other health record, independently interpreting results and communicating results to the patient/family/caregiver, or care coordinator.

## 2024-08-17 RX ORDER — TRIAMCINOLONE ACETONIDE 40 MG/ML
20 INJECTION, SUSPENSION INTRA-ARTICULAR; INTRAMUSCULAR
Status: SHIPPED | OUTPATIENT
Start: 2024-08-16

## 2024-08-17 NOTE — PROCEDURES
Tendon Sheath    Date/Time: 8/16/2024 11:30 AM    Performed by: Bobbi Drake PA-C  Authorized by: Bobbi Drake PA-C    Consent Done?:  Yes (Verbal)  Indications:  Pain  Site marked: the procedure site was marked    Timeout: prior to procedure the correct patient, procedure, and site was verified    Prep: patient was prepped and draped in usual sterile fashion      Location:  Thumb  Site:  L thumb flexor tendon sheath  Ultrasonic guidance for needle placement?: No    Needle size:  25 G  Approach:  Volar  Medications:  20 mg triamcinolone acetonide 40 mg/mL  Patient tolerance:  Patient tolerated the procedure well with no immediate complications

## 2024-08-20 ENCOUNTER — PATIENT MESSAGE (OUTPATIENT)
Dept: ORTHOPEDICS | Facility: CLINIC | Age: 59
End: 2024-08-20
Payer: OTHER GOVERNMENT

## 2024-09-19 ENCOUNTER — OFFICE VISIT (OUTPATIENT)
Dept: INTERNAL MEDICINE | Facility: CLINIC | Age: 59
End: 2024-09-19
Payer: OTHER GOVERNMENT

## 2024-09-19 VITALS
RESPIRATION RATE: 18 BRPM | HEART RATE: 84 BPM | WEIGHT: 184.94 LBS | BODY MASS INDEX: 27.39 KG/M2 | HEIGHT: 69 IN | TEMPERATURE: 98 F | OXYGEN SATURATION: 98 % | SYSTOLIC BLOOD PRESSURE: 148 MMHG | DIASTOLIC BLOOD PRESSURE: 102 MMHG

## 2024-09-19 DIAGNOSIS — I10 PRIMARY HYPERTENSION: ICD-10-CM

## 2024-09-19 DIAGNOSIS — N52.9 ERECTILE DYSFUNCTION, UNSPECIFIED ERECTILE DYSFUNCTION TYPE: ICD-10-CM

## 2024-09-19 DIAGNOSIS — Z00.00 ENCOUNTER FOR ANNUAL HEALTH EXAMINATION: ICD-10-CM

## 2024-09-19 DIAGNOSIS — Z12.5 ENCOUNTER FOR PROSTATE CANCER SCREENING: ICD-10-CM

## 2024-09-19 DIAGNOSIS — F90.2 ATTENTION DEFICIT HYPERACTIVITY DISORDER (ADHD), COMBINED TYPE: Primary | ICD-10-CM

## 2024-09-19 PROCEDURE — 99999 PR PBB SHADOW E&M-EST. PATIENT-LVL IV: CPT | Mod: PBBFAC,,, | Performed by: NURSE PRACTITIONER

## 2024-09-19 PROCEDURE — 99214 OFFICE O/P EST MOD 30 MIN: CPT | Mod: PBBFAC,PO | Performed by: NURSE PRACTITIONER

## 2024-09-19 PROCEDURE — 99214 OFFICE O/P EST MOD 30 MIN: CPT | Mod: S$PBB,,, | Performed by: NURSE PRACTITIONER

## 2024-09-19 RX ORDER — DEXTROAMPHETAMINE SACCHARATE, AMPHETAMINE ASPARTATE MONOHYDRATE, DEXTROAMPHETAMINE SULFATE AND AMPHETAMINE SULFATE 7.5; 7.5; 7.5; 7.5 MG/1; MG/1; MG/1; MG/1
30 CAPSULE, EXTENDED RELEASE ORAL EVERY MORNING
Qty: 30 CAPSULE | Refills: 0 | Status: SHIPPED | OUTPATIENT
Start: 2024-09-19

## 2024-09-19 RX ORDER — TADALAFIL 5 MG/1
5 TABLET ORAL DAILY PRN
Qty: 30 TABLET | Refills: 1 | Status: SHIPPED | OUTPATIENT
Start: 2024-09-19 | End: 2025-09-19

## 2024-09-19 RX ORDER — DEXTROAMPHETAMINE SACCHARATE, AMPHETAMINE ASPARTATE MONOHYDRATE, DEXTROAMPHETAMINE SULFATE AND AMPHETAMINE SULFATE 7.5; 7.5; 7.5; 7.5 MG/1; MG/1; MG/1; MG/1
30 CAPSULE, EXTENDED RELEASE ORAL EVERY MORNING
Qty: 30 CAPSULE | Refills: 0 | Status: SHIPPED | OUTPATIENT
Start: 2024-10-19

## 2024-09-19 RX ORDER — DEXTROAMPHETAMINE SACCHARATE, AMPHETAMINE ASPARTATE MONOHYDRATE, DEXTROAMPHETAMINE SULFATE AND AMPHETAMINE SULFATE 7.5; 7.5; 7.5; 7.5 MG/1; MG/1; MG/1; MG/1
30 CAPSULE, EXTENDED RELEASE ORAL EVERY MORNING
Qty: 30 CAPSULE | Refills: 0 | Status: SHIPPED | OUTPATIENT
Start: 2024-11-19

## 2024-09-19 RX ORDER — DEXTROAMPHETAMINE SACCHARATE, AMPHETAMINE ASPARTATE MONOHYDRATE, DEXTROAMPHETAMINE SULFATE AND AMPHETAMINE SULFATE 2.5; 2.5; 2.5; 2.5 MG/1; MG/1; MG/1; MG/1
CAPSULE, EXTENDED RELEASE ORAL
Qty: 30 CAPSULE | Refills: 0 | Status: SHIPPED | OUTPATIENT
Start: 2024-10-19

## 2024-09-19 RX ORDER — DEXTROAMPHETAMINE SACCHARATE, AMPHETAMINE ASPARTATE MONOHYDRATE, DEXTROAMPHETAMINE SULFATE AND AMPHETAMINE SULFATE 2.5; 2.5; 2.5; 2.5 MG/1; MG/1; MG/1; MG/1
CAPSULE, EXTENDED RELEASE ORAL
Qty: 30 CAPSULE | Refills: 0 | Status: SHIPPED | OUTPATIENT
Start: 2024-09-19

## 2024-09-19 RX ORDER — DEXTROAMPHETAMINE SACCHARATE, AMPHETAMINE ASPARTATE MONOHYDRATE, DEXTROAMPHETAMINE SULFATE AND AMPHETAMINE SULFATE 2.5; 2.5; 2.5; 2.5 MG/1; MG/1; MG/1; MG/1
CAPSULE, EXTENDED RELEASE ORAL
Qty: 30 CAPSULE | Refills: 0 | Status: SHIPPED | OUTPATIENT
Start: 2024-11-19

## 2024-09-19 NOTE — PROGRESS NOTES
"Subjective:       Patient ID: Brice Byrd is a 59 y.o. male.    Chief Complaint: Medication Refill (adderrall)      History of Present Illness    CHIEF COMPLAINT:  Mr. Byrd presents today for medication refills and to discuss blood pressure management.    HYPERTENSION:  He is currently taking Telmisartan (Micardis) 80 mg and Chlorthalidone 12.5 mg every other day for blood pressure management. He expresses concerns about chlorthalidone, including frequent urination and urgency issues. He reports having to wear a leakage pad when driving due to urgency, with only 30 seconds to 1 minute to reach a restroom when the urge occurs. He denies nocturia, weak stream, or other prostate-related symptoms. He has concerns about potassium levels due to the diuretic effect of chlorthalidone. He has purchased a home blood pressure monitor and plans to start monitoring his blood pressure in the mornings.    ADHD:  He requests refills for Adderall. He takes 30 mg extended release in the morning and 10 mg extended release in the afternoon as needed. He reports not taking Adderall after 5 PM to avoid sleep disturbances. 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 Currently, he is attending school at night to finish his college degree in emergency management.     BACK PAIN:  He underwent back surgery last year, which he describes as unexpected and unpleasant. Prior to surgery, he was diagnosed with a herniated disc at L3-L4 and bulging discs at L2-L3 and L3-L4, causing sciatica. The pain was excruciating, rated as "1,000 on a scale of 1 to 10" before treatment. He was unable to walk without assistance, requiring a cane. Previous treatments included steroid epidurals, gabapentin (discontinued due to cognitive side effects), Lyrica, painkillers, and muscle relaxers. Post-surgery, he reports significant improvement in mobility " and pain levels, though some limitations persist.  He currently walks every other morning and performs exercises and stretches for his back. He reports less pain and requiring less ibuprofen on days when he exercises. Prior to surgery, he was able to run 3-5 miles daily.    SEXUAL HEALTH:  He requests a prescription for Cialis to replace his  Viagra prescription, expressing a preference for Cialis due to potentially fewer side effects, particularly regarding headaches and nasal congestion.     ROS:  Head: +headaches  Eyes: -vision changes  ENT: +nasal congestion  Genitourinary: +frequency, +urgency, -nocturia  Psychiatric: -sleep difficulty         Review of patient's allergies indicates:   Allergen Reactions    Statins-hmg-coa reductase inhibitors Other (See Comments)     nausea, HA, diarrhea, tiredness       Medication List with Changes/Refills   New Medications    TADALAFIL (CIALIS) 5 MG TABLET    Take 1 tablet (5 mg total) by mouth daily as needed for Erectile Dysfunction.   Current Medications    EVOLOCUMAB (REPATHA SURECLICK) 140 MG/ML PNIJ    Inject 1 mL (140 mg total) into the skin every 14 (fourteen) days.    TELMISARTAN (MICARDIS) 80 MG TAB    Take 1 tablet (80 mg total) by mouth once daily.   Changed and/or Refilled Medications    Modified Medication Previous Medication    DEXTROAMPHETAMINE-AMPHETAMINE (ADDERALL XR) 10 MG 24 HR CAPSULE dextroamphetamine-amphetamine (ADDERALL XR) 10 MG 24 hr capsule       1 capsule every afternoon    1 capsule every afternoon    DEXTROAMPHETAMINE-AMPHETAMINE (ADDERALL XR) 10 MG 24 HR CAPSULE dextroamphetamine-amphetamine (ADDERALL XR) 10 MG 24 hr capsule       1 capsule every afternoon    1 capsule every afternoon    DEXTROAMPHETAMINE-AMPHETAMINE (ADDERALL XR) 10 MG 24 HR CAPSULE dextroamphetamine-amphetamine (ADDERALL XR) 10 MG 24 hr capsule       1 capsule every afternoon    1 capsule every afternoon    DEXTROAMPHETAMINE-AMPHETAMINE (ADDERALL XR) 30 MG 24 HR  "CAPSULE dextroamphetamine-amphetamine (ADDERALL XR) 30 MG 24 hr capsule       Take 1 capsule (30 mg total) by mouth every morning.    Take 1 capsule (30 mg total) by mouth every morning.    DEXTROAMPHETAMINE-AMPHETAMINE (ADDERALL XR) 30 MG 24 HR CAPSULE dextroamphetamine-amphetamine (ADDERALL XR) 30 MG 24 hr capsule       Take 1 capsule (30 mg total) by mouth every morning.    Take 1 capsule (30 mg total) by mouth every morning.    DEXTROAMPHETAMINE-AMPHETAMINE (ADDERALL XR) 30 MG 24 HR CAPSULE dextroamphetamine-amphetamine (ADDERALL XR) 30 MG 24 hr capsule       Take 1 capsule (30 mg total) by mouth every morning.    Take 1 capsule (30 mg total) by mouth every morning.   Discontinued Medications    CHLORTHALIDONE (HYGROTEN) 25 MG TAB    Take 1 tablet (25 mg total) by mouth once daily.    SILDENAFIL (VIAGRA) 100 MG TABLET    Take 1 tablet (100 mg total) by mouth daily as needed for Erectile Dysfunction.     Objective:   BP (!) 148/102 (BP Location: Left arm, Patient Position: Sitting, BP Method: Medium (Manual))   Pulse 84   Temp 97.6 °F (36.4 °C) (Temporal)   Resp 18   Ht 5' 9" (1.753 m)   Wt 83.9 kg (184 lb 15.5 oz)   SpO2 98%   BMI 27.31 kg/m²     Physical Exam  Vitals reviewed.   Constitutional:       Appearance: Normal appearance.   HENT:      Head: Normocephalic and atraumatic.   Pulmonary:      Effort: Pulmonary effort is normal.   Skin:     General: Skin is warm and dry.   Neurological:      Mental Status: He is alert and oriented to person, place, and time.       Assessment and Plan:     1. Attention deficit hyperactivity disorder (ADHD), combined type    Chronic, stable, continue current medications    - dextroamphetamine-amphetamine (ADDERALL XR) 10 MG 24 hr capsule; 1 capsule every afternoon  Dispense: 30 capsule; Refill: 0  - dextroamphetamine-amphetamine (ADDERALL XR) 30 MG 24 hr capsule; Take 1 capsule (30 mg total) by mouth every morning.  Dispense: 30 capsule; Refill: 0  - " dextroamphetamine-amphetamine (ADDERALL XR) 10 MG 24 hr capsule; 1 capsule every afternoon  Dispense: 30 capsule; Refill: 0  - dextroamphetamine-amphetamine (ADDERALL XR) 10 MG 24 hr capsule; 1 capsule every afternoon  Dispense: 30 capsule; Refill: 0  - dextroamphetamine-amphetamine (ADDERALL XR) 30 MG 24 hr capsule; Take 1 capsule (30 mg total) by mouth every morning.  Dispense: 30 capsule; Refill: 0  - dextroamphetamine-amphetamine (ADDERALL XR) 30 MG 24 hr capsule; Take 1 capsule (30 mg total) by mouth every morning.  Dispense: 30 capsule; Refill: 0    2. Primary hypertension    Chronic, elevated.    - Explained that potassium supplements are not appropriate for treating hypertension.  - Mr. Byrd to monitor blood pressure at home using personal blood pressure cuff. To bring personal blood pressure cuff to office for comparison with office equipment.  - Discontinued chlorthalidone 12.5 mg every other day.  - Continued telmisartan 80 mg daily.  - Follow up tomorrow between 8:30 AM and 4:00 PM with home blood pressure cuff for comparison.  - Send blood pressure readings early to late next week.    3. Erectile dysfunction, unspecified erectile dysfunction type    Chronic, stable    - tadalafiL (CIALIS) 5 MG tablet; Take 1 tablet (5 mg total) by mouth daily as needed for Erectile Dysfunction.  Dispense: 30 tablet; Refill: 1    4. Encounter for annual health examination    - CBC Auto Differential; Future  - Comprehensive Metabolic Panel; Future  - Lipid Panel; Future  - TSH; Future  - PSA, Screening; Future    5. Encounter for prostate cancer screening    - PSA, Screening; Future    FOLLOW UP:  - Follow up with Dr. Solomon for annual in December/January.           This note was generated with the assistance of ambient listening technology. Verbal consent was obtained by the patient and accompanying visitor(s) for the recording of patient appointment to facilitate this note. I attest to having reviewed and edited the  generated note for accuracy, though some syntax or spelling errors may persist. Please contact the author of this note for any clarification.

## 2024-09-20 ENCOUNTER — CLINICAL SUPPORT (OUTPATIENT)
Dept: INTERNAL MEDICINE | Facility: CLINIC | Age: 59
End: 2024-09-20
Payer: OTHER GOVERNMENT

## 2024-09-20 VITALS
DIASTOLIC BLOOD PRESSURE: 102 MMHG | SYSTOLIC BLOOD PRESSURE: 152 MMHG | WEIGHT: 183.44 LBS | HEART RATE: 92 BPM | HEIGHT: 69 IN | BODY MASS INDEX: 27.17 KG/M2 | OXYGEN SATURATION: 99 %

## 2024-09-20 DIAGNOSIS — I10 PRIMARY HYPERTENSION: Primary | ICD-10-CM

## 2024-09-20 PROCEDURE — 99999 PR PBB SHADOW E&M-EST. PATIENT-LVL IV: CPT | Mod: PBBFAC,,,

## 2024-09-20 PROCEDURE — 99214 OFFICE O/P EST MOD 30 MIN: CPT | Mod: PBBFAC,PO

## 2024-09-20 NOTE — PROGRESS NOTES
Left Arm   Home cuff: 166/109  Manual readin/102    Right Arm  Home cuff: 177/105  Manual readin/102    Patient advised to continue 80 mg Micardis daily (stop Chlorthalidone as we discussed) and monitor BP daily. Send readings next week.     About 10-20 point difference systolic and 3-5 point difference diastolic.   Goal BP <140/90 (meaning at home it should be < 160/95)    Home Readings:   147/98  141/100  138/92  132/98  139/93

## 2024-09-23 ENCOUNTER — OFFICE VISIT (OUTPATIENT)
Dept: ORTHOPEDICS | Facility: CLINIC | Age: 59
End: 2024-09-23
Payer: OTHER GOVERNMENT

## 2024-09-23 ENCOUNTER — HOSPITAL ENCOUNTER (OUTPATIENT)
Dept: RADIOLOGY | Facility: HOSPITAL | Age: 59
Discharge: HOME | End: 2024-09-23
Payer: OTHER GOVERNMENT

## 2024-09-23 VITALS — HEIGHT: 69 IN | BODY MASS INDEX: 27.17 KG/M2 | WEIGHT: 183.44 LBS

## 2024-09-23 DIAGNOSIS — R52 PAIN: Primary | ICD-10-CM

## 2024-09-23 DIAGNOSIS — G56.21 ULNAR TUNNEL SYNDROME, RIGHT: ICD-10-CM

## 2024-09-23 DIAGNOSIS — R52 PAIN: ICD-10-CM

## 2024-09-23 DIAGNOSIS — M79.642 BILATERAL HAND PAIN: Primary | ICD-10-CM

## 2024-09-23 DIAGNOSIS — M79.641 BILATERAL HAND PAIN: Primary | ICD-10-CM

## 2024-09-23 DIAGNOSIS — G56.23 CUBITAL TUNNEL SYNDROME, BILATERAL: ICD-10-CM

## 2024-09-23 DIAGNOSIS — G56.22 ULNAR TUNNEL SYNDROME, LEFT: ICD-10-CM

## 2024-09-23 DIAGNOSIS — M18.0 OSTEOARTHRITIS OF CARPOMETACARPAL (CMC) JOINTS OF BOTH THUMBS, UNSPECIFIED OSTEOARTHRITIS TYPE: ICD-10-CM

## 2024-09-23 PROCEDURE — 99213 OFFICE O/P EST LOW 20 MIN: CPT | Mod: S$PBB,,,

## 2024-09-23 PROCEDURE — 73130 X-RAY EXAM OF HAND: CPT | Mod: 26,50,, | Performed by: RADIOLOGY

## 2024-09-23 PROCEDURE — 99213 OFFICE O/P EST LOW 20 MIN: CPT | Mod: PBBFAC,25,PN

## 2024-09-23 PROCEDURE — 73130 X-RAY EXAM OF HAND: CPT | Mod: TC,50,PN

## 2024-09-23 PROCEDURE — 99999 PR PBB SHADOW E&M-EST. PATIENT-LVL III: CPT | Mod: PBBFAC,,,

## 2024-09-23 NOTE — PROGRESS NOTES
SUBJECTIVE:      Chief Complaint: No chief complaint on file.      History of Present Illness:  Brice Byrd is a 59 y.o. who presents for bilateral UE EMG and bilateral follow up.  Last saw me on 08/16/2024   Given CSI for left trigger thumb, which has provided significant relief of thumb pain.    At previous appointment, patient requested evaluation and CSI for bilateral CMC OA, although these symptoms have subsided at this time  Today, patient is interested in bilateral CSI for CTS        Previous history:  Patient is a RHD 59 y.o. who presents today with complaints of bilateral hand pain.   Patient reports multiyear history of bilateral hand pain including CTS, cubital tunnel, trigger finger, and CMC OA, for which he has previously been treated with CSI, although unclear which diagnoses were treated with CSI  Pain has worsened over the past 2 weeks.  No history of trauma   Pain is intermittent located to diffuse bilateral hands and described as aching, sharp, stiffness, throbbing, and numbness and tingling.  Symptoms are aggravated by activity.  Symptoms are alleviated by rest.  Attempted treatment(s) and/or interventions include rest, bracing, NSAIDs, Tylenol, CSI without adequate response.    Is affecting ADLs and limiting desired level of activity. Denies neck pain, numbness, and tingling in fingertips.    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    Past Medical History:   Diagnosis Date    ADHD (attention deficit hyperactivity disorder)     Anxiety     Hx of psychiatric care     Hypertension     Psychiatric problem     PTSD (post-traumatic stress disorder)     SVT (supraventricular tachycardia)     Therapy      Past Surgical History:   Procedure Laterality Date    ABLATION N/A 02/18/2022    Procedure: Ablation;  Surgeon: Al Guerra MD;  Location: [facility] EP LAB;  Service: Cardiology;   Laterality: N/A;  SVT, RFA, JERONIMO, anes, MB, 3prep    BACK SURGERY      EPIDURAL STEROID INJECTION Left 05/24/2023    Procedure: Left L4 + L5 Transforaminal Epidural Steroid Injections (ref: Roberson);  Surgeon: Al Batres Jr., MD;  Location: [facility] ENDO;  Service: Pain Management;  Laterality: Left;  @1330  No ATC or DM  Needs Consent    EPIDURAL STEROID INJECTION Left 06/14/2023    Procedure: Left L4 Transforaminal Epidural Steroid Injection;  Surgeon: Al Batres Jr., MD;  Location: [facility] ENDO;  Service: Pain Management;  Laterality: Left;  @1230  No ATC or DM  Needs COnsent    LUMBAR LAMINECTOMY WITH DISCECTOMY N/A 07/12/2023    Procedure: LAMINECTOMY, SPINE, LUMBAR, WITH DISCECTOMY L4-5  JENNIFFER FRAME GUSTAVO RETRACTORS SNS: SSEP/EMG;  Surgeon: Marko Campbell MD;  Location: [facility] OR;  Service: Orthopedics;  Laterality: N/A;    VASECTOMY       Review of patient's allergies indicates:   Allergen Reactions    Statins-hmg-coa reductase inhibitors Other (See Comments)     nausea, HA, diarrhea, tiredness     Social History     Social History Narrative    Not on file     No family history on file.      Current Outpatient Medications:     [START ON 11/19/2024] dextroamphetamine-amphetamine (ADDERALL XR) 10 MG 24 hr capsule, 1 capsule every afternoon, Disp: 30 capsule, Rfl: 0    [START ON 10/19/2024] dextroamphetamine-amphetamine (ADDERALL XR) 10 MG 24 hr capsule, 1 capsule every afternoon, Disp: 30 capsule, Rfl: 0    dextroamphetamine-amphetamine (ADDERALL XR) 10 MG 24 hr capsule, 1 capsule every afternoon, Disp: 30 capsule, Rfl: 0    [START ON 11/19/2024] dextroamphetamine-amphetamine (ADDERALL XR) 30 MG 24 hr capsule, Take 1 capsule (30 mg total) by mouth every morning., Disp: 30 capsule, Rfl: 0    [START ON 10/19/2024] dextroamphetamine-amphetamine (ADDERALL XR) 30 MG 24 hr capsule, Take 1 capsule (30 mg total) by mouth every morning., Disp: 30 capsule, Rfl: 0    dextroamphetamine-amphetamine (ADDERALL  XR) 30 MG 24 hr capsule, Take 1 capsule (30 mg total) by mouth every morning., Disp: 30 capsule, Rfl: 0    evolocumab (REPATHA SURECLICK) 140 mg/mL PnIj, Inject 1 mL (140 mg total) into the skin every 14 (fourteen) days., Disp: 6 mL, Rfl: 3    tadalafiL (CIALIS) 5 MG tablet, Take 1 tablet (5 mg total) by mouth daily as needed for Erectile Dysfunction., Disp: 30 tablet, Rfl: 1    telmisartan (MICARDIS) 80 MG Tab, Take 1 tablet (80 mg total) by mouth once daily., Disp: 90 tablet, Rfl: 3    Current Facility-Administered Medications:     betamethasone acetate-betamethasone sodium phosphate injection 6 mg, 6 mg, Intramuscular, 1 time in Clinic/HOD, Venkata Cade MD    Facility-Administered Medications Ordered in Other Visits:     triamcinolone acetonide injection 20 mg, 20 mg, Intra-articular, , Bobbi Drake PA-C, 20 mg at 08/16/24 1130    Review of Systems   Musculoskeletal:  Positive for arthritis, joint pain, joint swelling, muscle weakness, myalgias and stiffness.   Neurological:  Positive for numbness and paresthesias.       OBJECTIVE:      Vital Signs (Most Recent):  There were no vitals filed for this visit.    There is no height or weight on file to calculate BMI.      Physical Exam:  Bilateral Hand/Wrist Examination:  Observation/Inspection:  Swelling  mild bilaterally    Deformity  none  Discoloration  none     Scars   none    Atrophy  none  Strength  4/5  TTP   left A1 pulley of the thumb and small fingers    ROM hand full, painless  ROM wrist full, painless  ROM elbow full, painless    HAND/WRIST EXAMINATION:  Finkelstein's Test   Neg  WHAT Test    Neg  Snuff box tenderness   Neg  Kwon's Test    Neg  Hook of Hamate Tenderness  Neg  CMC grind    neg  CMC crepitus    positive bilaterally  Circumduction test   Neg  TFCC Compression Test    Neg     Neurovascular Exam:  Digits WWP, brisk CR < 3s throughout  NVI motor/LTS to M/R/U nerves, radial pulse 2+  Tinel's Test - Carpal Tunnel  positive left  hand  Tinel's Test - Cubital Tunnel  positive bilaterally (R>L)  Phalen's Test    positive left hand  Durkan's Test    positive to left hand  Median Nerve Compression Test positive left hand  Table top Test    Neg    Diagnostic Results:  X-ray- I independently interpreted the patient's imaging. Xrays of the patient's bilateral hands demonstrate very little osteophyte formation to bilateral thumbs, bilateral osteosclerosis (R>L), and mild lateral subluxation of the 1st metacarpal bilateral  no evidence of any acute fractures or dislocations     EMG-  I independently interpreted the patient's imaging. EMG of the patient's bilateral UE  demonstrates bilateral carpal tunnel (mild, L>R) and bilateral ulnar tunnel syndrome at Guyon's canal (mild)  No cervical radiculopathy    ASSESSMENT/PLAN:      1. Bilateral hand pain    2. Osteoarthritis of carpometacarpal (CMC) joints of both thumbs, unspecified osteoarthritis type    3. Cubital tunnel syndrome, bilateral    4. Ulnar tunnel syndrome, left    5. Ulnar tunnel syndrome, right          59 y.o. y/o male with cubital tunnel, suspected bilateral CMC OA, trigger finger to the left thumb and small finger, and CTS to left hand  Plan: The patient and I had a thorough discussion today.  We discussed the working diagnosis as well as several other potential alternative diagnoses.    We discussed conservative and surgical treatment options. Conservative options include rest, activity modifications, workplace modifications, po and topical analgesia (OTC and rx), formal or home PT, and others. Surgical treatment was also mentioned.    At this time, the patient would like to proceed with the following, which I agree with.    Medications: OTC analgesia prn for pain. Precautions advised. Pt expressed understanding  Procedures:  CSI given today for bilateral CTS  DME:  Bilateral CTS gel wrist braces given to patient today.  These can be worn during times of increased activity at the  computer and while sleeping at  Work status:  WBAT with limitation secondary to pain  Follow up:  6-8 weeks with me for a virtual audio appointment    All of the patient's questions were answered and the patient will contact us if they have any questions or concerns in the interim.        oBbbi Drake PA-C  [facility]  Orthopedic Surgery              No LOS data to display  This includes face to face time and non-face to face time preparing to see the patient (eg, review of tests), obtaining and/or reviewing separately obtained history, documenting clinical information in the electronic or other health record, independently interpreting results and communicating results to the patient/family/caregiver, or care coordinator.

## 2024-09-30 ENCOUNTER — PATIENT MESSAGE (OUTPATIENT)
Dept: INTERNAL MEDICINE | Facility: CLINIC | Age: 59
End: 2024-09-30
Payer: OTHER GOVERNMENT

## 2024-10-28 ENCOUNTER — PATIENT MESSAGE (OUTPATIENT)
Dept: INTERNAL MEDICINE | Facility: CLINIC | Age: 59
End: 2024-10-28
Payer: OTHER GOVERNMENT

## 2024-10-28 ENCOUNTER — TELEPHONE (OUTPATIENT)
Dept: INTERNAL MEDICINE | Facility: CLINIC | Age: 59
End: 2024-10-28
Payer: OTHER GOVERNMENT

## 2024-11-13 ENCOUNTER — OFFICE VISIT (OUTPATIENT)
Dept: ORTHOPEDICS | Facility: CLINIC | Age: 59
End: 2024-11-13
Payer: OTHER GOVERNMENT

## 2024-11-13 VITALS — BODY MASS INDEX: 27.09 KG/M2 | HEIGHT: 69 IN

## 2024-11-13 DIAGNOSIS — G56.03 BILATERAL CARPAL TUNNEL SYNDROME: Primary | ICD-10-CM

## 2024-11-13 PROCEDURE — 20526 THER INJECTION CARP TUNNEL: CPT | Mod: 50,PBBFAC,PN | Performed by: ORTHOPAEDIC SURGERY

## 2024-11-13 PROCEDURE — 99999PBSHW PR PBB SHADOW TECHNICAL ONLY FILED TO HB: Mod: PBBFAC,,,

## 2024-11-13 PROCEDURE — 99999 PR PBB SHADOW E&M-EST. PATIENT-LVL III: CPT | Mod: PBBFAC,,, | Performed by: ORTHOPAEDIC SURGERY

## 2024-11-13 PROCEDURE — 99213 OFFICE O/P EST LOW 20 MIN: CPT | Mod: PBBFAC,PN | Performed by: ORTHOPAEDIC SURGERY

## 2024-11-13 RX ORDER — TRIAMCINOLONE ACETONIDE 40 MG/ML
40 INJECTION, SUSPENSION INTRA-ARTICULAR; INTRAMUSCULAR
Status: COMPLETED | OUTPATIENT
Start: 2024-11-13 | End: 2024-11-13

## 2024-11-13 RX ADMIN — TRIAMCINOLONE ACETONIDE 40 MG: 40 INJECTION, SUSPENSION INTRA-ARTICULAR; INTRAMUSCULAR at 02:11

## 2024-11-13 NOTE — PROGRESS NOTES
Subjective:      Patient ID: Brice Byrd is a 59 y.o. male.    Chief Complaint: Follow-up (Bilateral hand/ finger pain and swelling)      HPI  Brice Byrd is a  59 y.o. male presenting today for evaluation of bilateral carpal tunnel syndrome and triggering of the thumbs.  There was not a history of trauma.  Onset of symptoms began several months ago he did have an injection a few months ago with good results but now symptoms have recurred   He does report occasional muscle spasms in the hands   .      Review of patient's allergies indicates:   Allergen Reactions    Statins-hmg-coa reductase inhibitors Other (See Comments)     nausea, HA, diarrhea, tiredness         Current Outpatient Medications   Medication Sig Dispense Refill    [START ON 11/19/2024] dextroamphetamine-amphetamine (ADDERALL XR) 10 MG 24 hr capsule 1 capsule every afternoon 30 capsule 0    dextroamphetamine-amphetamine (ADDERALL XR) 10 MG 24 hr capsule 1 capsule every afternoon 30 capsule 0    dextroamphetamine-amphetamine (ADDERALL XR) 10 MG 24 hr capsule 1 capsule every afternoon 30 capsule 0    [START ON 11/19/2024] dextroamphetamine-amphetamine (ADDERALL XR) 30 MG 24 hr capsule Take 1 capsule (30 mg total) by mouth every morning. 30 capsule 0    dextroamphetamine-amphetamine (ADDERALL XR) 30 MG 24 hr capsule Take 1 capsule (30 mg total) by mouth every morning. 30 capsule 0    dextroamphetamine-amphetamine (ADDERALL XR) 30 MG 24 hr capsule Take 1 capsule (30 mg total) by mouth every morning. 30 capsule 0    evolocumab (REPATHA SURECLICK) 140 mg/mL PnIj Inject 1 mL (140 mg total) into the skin every 14 (fourteen) days. 6 mL 3    tadalafiL (CIALIS) 5 MG tablet Take 1 tablet (5 mg total) by mouth daily as needed for Erectile Dysfunction. 30 tablet 1    telmisartan (MICARDIS) 80 MG Tab Take 1 tablet (80 mg total) by mouth once daily. 90 tablet 3     Current Facility-Administered Medications   Medication Dose Route Frequency Provider Last  "Rate Last Admin    betamethasone acetate-betamethasone sodium phosphate injection 6 mg  6 mg Intramuscular 1 time in Clinic/HOD Venkata Cade MD         Facility-Administered Medications Ordered in Other Visits   Medication Dose Route Frequency Provider Last Rate Last Admin    triamcinolone acetonide injection 20 mg  20 mg Intra-articular  Bobbi Drake PA-C   20 mg at 08/16/24 1130       Past Medical History:   Diagnosis Date    ADHD (attention deficit hyperactivity disorder)     Anxiety     Hx of psychiatric care     Hypertension     Psychiatric problem     PTSD (post-traumatic stress disorder)     SVT (supraventricular tachycardia)     Therapy        Past Surgical History:   Procedure Laterality Date    ABLATION N/A 02/18/2022    Procedure: Ablation;  Surgeon: Al Guerra MD;  Location: [facility] EP LAB;  Service: Cardiology;  Laterality: N/A;  SVT, RFA, JERONIMO, anes, MB, 3prep    BACK SURGERY      EPIDURAL STEROID INJECTION Left 05/24/2023    Procedure: Left L4 + L5 Transforaminal Epidural Steroid Injections (ref: Roberson);  Surgeon: Al Batres Jr., MD;  Location: [facility] ENDO;  Service: Pain Management;  Laterality: Left;  @1330  No ATC or DM  Needs Consent    EPIDURAL STEROID INJECTION Left 06/14/2023    Procedure: Left L4 Transforaminal Epidural Steroid Injection;  Surgeon: Al Batres Jr., MD;  Location: [facility] ENDO;  Service: Pain Management;  Laterality: Left;  @1230  No ATC or DM  Needs COnsent    LUMBAR LAMINECTOMY WITH DISCECTOMY N/A 07/12/2023    Procedure: LAMINECTOMY, SPINE, LUMBAR, WITH DISCECTOMY L4-5  JENNIFFER FRAME GUSTAVO RETRACTORS SNS: SSEP/EMG;  Surgeon: Marko Campbell MD;  Location: [facility] OR;  Service: Orthopedics;  Laterality: N/A;    VASECTOMY         Review of Systems:  ROS    OBJECTIVE:     PHYSICAL EXAM:  Height: 5' 9" (175.3 cm)    Vitals:    11/13/24 1329   Height: 5' 9" (1.753 m)   PainSc:   5   PainLoc: Hand     Well developed, well nourished male in no acute " distress  Alert and oriented x 3  HEENT- Normal exam  Lungs- Clear to auscultation  Heart- Regular rate and rhythm  Abdomen- Soft nontender  Extremity exam- examination of the hands full range of motion thumbs no triggering   Tinel sign mildly positive Phalen's test negative no atrophy noted  strength decreased    RADIOGRAPHS:  No  Comments: I have personally reviewed the imaging and I agree with the above radiologist's report.    ASSESSMENT/PLAN:     IMPRESSION:  1. Bilateral carpal tunnel syndrome.      2. Bilateral triggering of the thumb    PLAN:  We discussed options including injection versus surgery   He would like injection   I did explain that we can not do too many of these injections especially and a limited.    He understands   Continue wrist braces   Follow-up 3 months wrist braces at night 10 bilateral carpal tu  Continue nnel with combination Kenalog 20 mg 0.5 cc xylocaine sterile technique   Tolerated the procedure well without complications  After pause for time-out identified each wrist inject       - We talked at length about the anatomy and pathophysiology of   Encounter Diagnosis   Name Primary?    Bilateral carpal tunnel syndrome Yes           Disclaimer: This note has been generated using voice-recognition software. There may be typographical errors that have been missed during proof-reading.

## 2024-11-19 DIAGNOSIS — N52.9 ERECTILE DYSFUNCTION, UNSPECIFIED ERECTILE DYSFUNCTION TYPE: ICD-10-CM

## 2024-11-19 RX ORDER — TADALAFIL 5 MG/1
5 TABLET ORAL DAILY PRN
Qty: 30 TABLET | Refills: 0 | Status: SHIPPED | OUTPATIENT
Start: 2024-11-19 | End: 2025-11-19

## 2024-12-12 ENCOUNTER — LAB VISIT (OUTPATIENT)
Dept: LAB | Facility: HOSPITAL | Age: 59
End: 2024-12-12
Payer: OTHER GOVERNMENT

## 2024-12-12 ENCOUNTER — OFFICE VISIT (OUTPATIENT)
Dept: INTERNAL MEDICINE | Facility: CLINIC | Age: 59
End: 2024-12-12
Payer: OTHER GOVERNMENT

## 2024-12-12 VITALS
HEART RATE: 73 BPM | HEIGHT: 69 IN | OXYGEN SATURATION: 99 % | DIASTOLIC BLOOD PRESSURE: 88 MMHG | RESPIRATION RATE: 18 BRPM | WEIGHT: 180.13 LBS | TEMPERATURE: 97 F | SYSTOLIC BLOOD PRESSURE: 136 MMHG | BODY MASS INDEX: 26.68 KG/M2

## 2024-12-12 DIAGNOSIS — Z12.11 ENCOUNTER FOR COLORECTAL CANCER SCREENING: ICD-10-CM

## 2024-12-12 DIAGNOSIS — Z12.12 ENCOUNTER FOR COLORECTAL CANCER SCREENING: ICD-10-CM

## 2024-12-12 DIAGNOSIS — Z00.00 ENCOUNTER FOR ANNUAL HEALTH EXAMINATION: Primary | ICD-10-CM

## 2024-12-12 DIAGNOSIS — F90.2 ATTENTION DEFICIT HYPERACTIVITY DISORDER (ADHD), COMBINED TYPE: ICD-10-CM

## 2024-12-12 DIAGNOSIS — I10 PRIMARY HYPERTENSION: ICD-10-CM

## 2024-12-12 DIAGNOSIS — Z12.5 ENCOUNTER FOR PROSTATE CANCER SCREENING: ICD-10-CM

## 2024-12-12 DIAGNOSIS — E78.2 MIXED HYPERLIPIDEMIA: ICD-10-CM

## 2024-12-12 DIAGNOSIS — N52.9 ERECTILE DYSFUNCTION, UNSPECIFIED ERECTILE DYSFUNCTION TYPE: ICD-10-CM

## 2024-12-12 DIAGNOSIS — Z00.00 ENCOUNTER FOR ANNUAL HEALTH EXAMINATION: ICD-10-CM

## 2024-12-12 LAB
ALBUMIN SERPL BCP-MCNC: 3.8 G/DL (ref 3.5–5.2)
ALP SERPL-CCNC: 112 U/L (ref 40–150)
ALT SERPL W/O P-5'-P-CCNC: 125 U/L (ref 10–44)
ANION GAP SERPL CALC-SCNC: 7 MMOL/L (ref 8–16)
AST SERPL-CCNC: 44 U/L (ref 10–40)
BASOPHILS # BLD AUTO: 0.08 K/UL (ref 0–0.2)
BASOPHILS NFR BLD: 1.4 % (ref 0–1.9)
BILIRUB SERPL-MCNC: 0.8 MG/DL (ref 0.1–1)
BUN SERPL-MCNC: 15 MG/DL (ref 6–20)
CALCIUM SERPL-MCNC: 8.9 MG/DL (ref 8.7–10.5)
CHLORIDE SERPL-SCNC: 103 MMOL/L (ref 95–110)
CHOLEST SERPL-MCNC: 250 MG/DL (ref 120–199)
CHOLEST/HDLC SERPL: 4.1 {RATIO} (ref 2–5)
CO2 SERPL-SCNC: 25 MMOL/L (ref 23–29)
COMPLEXED PSA SERPL-MCNC: 0.82 NG/ML (ref 0–4)
CREAT SERPL-MCNC: 1 MG/DL (ref 0.5–1.4)
DIFFERENTIAL METHOD BLD: ABNORMAL
EOSINOPHIL # BLD AUTO: 0.2 K/UL (ref 0–0.5)
EOSINOPHIL NFR BLD: 3.4 % (ref 0–8)
ERYTHROCYTE [DISTWIDTH] IN BLOOD BY AUTOMATED COUNT: 12.1 % (ref 11.5–14.5)
EST. GFR  (NO RACE VARIABLE): >60 ML/MIN/1.73 M^2
GLUCOSE SERPL-MCNC: 85 MG/DL (ref 70–110)
HCT VFR BLD AUTO: 43 % (ref 40–54)
HDLC SERPL-MCNC: 61 MG/DL (ref 40–75)
HDLC SERPL: 24.4 % (ref 20–50)
HGB BLD-MCNC: 13.8 G/DL (ref 14–18)
IMM GRANULOCYTES # BLD AUTO: 0.06 K/UL (ref 0–0.04)
IMM GRANULOCYTES NFR BLD AUTO: 1.1 % (ref 0–0.5)
LDLC SERPL CALC-MCNC: 176.2 MG/DL (ref 63–159)
LYMPHOCYTES # BLD AUTO: 1.6 K/UL (ref 1–4.8)
LYMPHOCYTES NFR BLD: 27.8 % (ref 18–48)
MCH RBC QN AUTO: 31.1 PG (ref 27–31)
MCHC RBC AUTO-ENTMCNC: 32.1 G/DL (ref 32–36)
MCV RBC AUTO: 97 FL (ref 82–98)
MONOCYTES # BLD AUTO: 0.5 K/UL (ref 0.3–1)
MONOCYTES NFR BLD: 8.4 % (ref 4–15)
NEUTROPHILS # BLD AUTO: 3.2 K/UL (ref 1.8–7.7)
NEUTROPHILS NFR BLD: 57.9 % (ref 38–73)
NONHDLC SERPL-MCNC: 189 MG/DL
NRBC BLD-RTO: 0 /100 WBC
PLATELET # BLD AUTO: 196 K/UL (ref 150–450)
PMV BLD AUTO: 10.1 FL (ref 9.2–12.9)
POTASSIUM SERPL-SCNC: 4 MMOL/L (ref 3.5–5.1)
PROT SERPL-MCNC: 6.5 G/DL (ref 6–8.4)
RBC # BLD AUTO: 4.44 M/UL (ref 4.6–6.2)
SODIUM SERPL-SCNC: 135 MMOL/L (ref 136–145)
TRIGL SERPL-MCNC: 64 MG/DL (ref 30–150)
TSH SERPL DL<=0.005 MIU/L-ACNC: 1.98 UIU/ML (ref 0.4–4)
WBC # BLD AUTO: 5.58 K/UL (ref 3.9–12.7)

## 2024-12-12 PROCEDURE — 80053 COMPREHEN METABOLIC PANEL: CPT | Performed by: NURSE PRACTITIONER

## 2024-12-12 PROCEDURE — 99999 PR PBB SHADOW E&M-EST. PATIENT-LVL IV: CPT | Mod: PBBFAC,,, | Performed by: NURSE PRACTITIONER

## 2024-12-12 PROCEDURE — 84443 ASSAY THYROID STIM HORMONE: CPT | Performed by: NURSE PRACTITIONER

## 2024-12-12 PROCEDURE — 99214 OFFICE O/P EST MOD 30 MIN: CPT | Mod: PBBFAC,PO | Performed by: NURSE PRACTITIONER

## 2024-12-12 PROCEDURE — 36415 COLL VENOUS BLD VENIPUNCTURE: CPT | Mod: PO | Performed by: NURSE PRACTITIONER

## 2024-12-12 PROCEDURE — 99396 PREV VISIT EST AGE 40-64: CPT | Mod: S$PBB,,, | Performed by: NURSE PRACTITIONER

## 2024-12-12 PROCEDURE — 85025 COMPLETE CBC W/AUTO DIFF WBC: CPT | Performed by: NURSE PRACTITIONER

## 2024-12-12 PROCEDURE — 84153 ASSAY OF PSA TOTAL: CPT | Performed by: NURSE PRACTITIONER

## 2024-12-12 PROCEDURE — 80061 LIPID PANEL: CPT | Performed by: NURSE PRACTITIONER

## 2024-12-12 RX ORDER — DEXTROAMPHETAMINE SACCHARATE, AMPHETAMINE ASPARTATE MONOHYDRATE, DEXTROAMPHETAMINE SULFATE AND AMPHETAMINE SULFATE 2.5; 2.5; 2.5; 2.5 MG/1; MG/1; MG/1; MG/1
CAPSULE, EXTENDED RELEASE ORAL
Qty: 30 CAPSULE | Refills: 0 | Status: SHIPPED | OUTPATIENT
Start: 2025-02-12

## 2024-12-12 RX ORDER — DEXTROAMPHETAMINE SACCHARATE, AMPHETAMINE ASPARTATE MONOHYDRATE, DEXTROAMPHETAMINE SULFATE AND AMPHETAMINE SULFATE 7.5; 7.5; 7.5; 7.5 MG/1; MG/1; MG/1; MG/1
30 CAPSULE, EXTENDED RELEASE ORAL EVERY MORNING
Qty: 30 CAPSULE | Refills: 0 | Status: SHIPPED | OUTPATIENT
Start: 2025-02-12

## 2024-12-12 RX ORDER — DEXTROAMPHETAMINE SACCHARATE, AMPHETAMINE ASPARTATE MONOHYDRATE, DEXTROAMPHETAMINE SULFATE AND AMPHETAMINE SULFATE 2.5; 2.5; 2.5; 2.5 MG/1; MG/1; MG/1; MG/1
CAPSULE, EXTENDED RELEASE ORAL
Qty: 30 CAPSULE | Refills: 0 | Status: SHIPPED | OUTPATIENT
Start: 2024-12-12

## 2024-12-12 RX ORDER — TADALAFIL 5 MG/1
5 TABLET ORAL DAILY PRN
Qty: 90 TABLET | Refills: 1 | Status: SHIPPED | OUTPATIENT
Start: 2024-12-12 | End: 2025-12-12

## 2024-12-12 RX ORDER — DEXTROAMPHETAMINE SACCHARATE, AMPHETAMINE ASPARTATE MONOHYDRATE, DEXTROAMPHETAMINE SULFATE AND AMPHETAMINE SULFATE 7.5; 7.5; 7.5; 7.5 MG/1; MG/1; MG/1; MG/1
30 CAPSULE, EXTENDED RELEASE ORAL EVERY MORNING
Qty: 30 CAPSULE | Refills: 0 | Status: SHIPPED | OUTPATIENT
Start: 2024-12-12

## 2024-12-12 RX ORDER — DEXTROAMPHETAMINE SACCHARATE, AMPHETAMINE ASPARTATE MONOHYDRATE, DEXTROAMPHETAMINE SULFATE AND AMPHETAMINE SULFATE 2.5; 2.5; 2.5; 2.5 MG/1; MG/1; MG/1; MG/1
CAPSULE, EXTENDED RELEASE ORAL
Qty: 30 CAPSULE | Refills: 0 | Status: SHIPPED | OUTPATIENT
Start: 2025-01-12

## 2024-12-12 RX ORDER — DEXTROAMPHETAMINE SACCHARATE, AMPHETAMINE ASPARTATE MONOHYDRATE, DEXTROAMPHETAMINE SULFATE AND AMPHETAMINE SULFATE 7.5; 7.5; 7.5; 7.5 MG/1; MG/1; MG/1; MG/1
30 CAPSULE, EXTENDED RELEASE ORAL EVERY MORNING
Qty: 30 CAPSULE | Refills: 0 | Status: SHIPPED | OUTPATIENT
Start: 2025-01-12

## 2024-12-12 NOTE — PROGRESS NOTES
"Subjective:       Patient ID: Brice Byrd is a 59 y.o. male.    Chief Complaint: Annual Wellness Visit    Brice Byrd is a 59 y.o. male who presents today for an annual wellness visit.     HYPERTENSION:  He reports home blood pressure readings with systolic typically lower in the mornings. Recent blood pressure measurement was 136/88. He is currently taking blood pressure medication.    ADHD:  He reports significant improvement in managing his ADHD symptoms, describing his current treatment as "fantastic." He states that learning to live with the condition has allowed him to calm his "thousand mile an hour brain" and improve his focus. His current medication regimen consists of 30 mg in the morning and 10 mg later in the day. He occasionally skips doses on weekends when he wants to have a "lazy day" with no commitments.    ERECTILE DYSFUNCTION:  He is currently prescribed Cialis 5 mg for erectile dysfunction treatment. He reports taking the medication every morning on a daily basis, despite the typical recommendation to take it as needed on days when intercourse is anticipated.     PREVENTIVE CARE:  He has not received his flu vaccine for the year.          Review of patient's allergies indicates:   Allergen Reactions    Statins-hmg-coa reductase inhibitors Other (See Comments)     nausea, HA, diarrhea, tiredness      Medication List with Changes/Refills   Current Medications    EVOLOCUMAB (REPATHA SURECLICK) 140 MG/ML PNIJ    Inject 1 mL (140 mg total) into the skin every 14 (fourteen) days.    TELMISARTAN (MICARDIS) 80 MG TAB    Take 1 tablet (80 mg total) by mouth once daily.   Changed and/or Refilled Medications    Modified Medication Previous Medication    DEXTROAMPHETAMINE-AMPHETAMINE (ADDERALL XR) 10 MG 24 HR CAPSULE dextroamphetamine-amphetamine (ADDERALL XR) 10 MG 24 hr capsule       1 capsule every afternoon    1 capsule every afternoon    DEXTROAMPHETAMINE-AMPHETAMINE (ADDERALL XR) 10 MG 24 HR " CAPSULE dextroamphetamine-amphetamine (ADDERALL XR) 10 MG 24 hr capsule       1 capsule every afternoon    1 capsule every afternoon    DEXTROAMPHETAMINE-AMPHETAMINE (ADDERALL XR) 10 MG 24 HR CAPSULE dextroamphetamine-amphetamine (ADDERALL XR) 10 MG 24 hr capsule       1 capsule every afternoon    1 capsule every afternoon    DEXTROAMPHETAMINE-AMPHETAMINE (ADDERALL XR) 30 MG 24 HR CAPSULE dextroamphetamine-amphetamine (ADDERALL XR) 30 MG 24 hr capsule       Take 1 capsule (30 mg total) by mouth every morning.    Take 1 capsule (30 mg total) by mouth every morning.    DEXTROAMPHETAMINE-AMPHETAMINE (ADDERALL XR) 30 MG 24 HR CAPSULE dextroamphetamine-amphetamine (ADDERALL XR) 30 MG 24 hr capsule       Take 1 capsule (30 mg total) by mouth every morning.    Take 1 capsule (30 mg total) by mouth every morning.    DEXTROAMPHETAMINE-AMPHETAMINE (ADDERALL XR) 30 MG 24 HR CAPSULE dextroamphetamine-amphetamine (ADDERALL XR) 30 MG 24 hr capsule       Take 1 capsule (30 mg total) by mouth every morning.    Take 1 capsule (30 mg total) by mouth every morning.    TADALAFIL (CIALIS) 5 MG TABLET tadalafiL (CIALIS) 5 MG tablet       Take 1 tablet (5 mg total) by mouth daily as needed for Erectile Dysfunction.    Take 1 tablet (5 mg total) by mouth daily as needed for Erectile Dysfunction.     Health Maintenance         Date Due Completion Date    Shingles Vaccine (1 of 2) Never done ---    COVID-19 Vaccine (4 - 2024-25 season) 09/01/2024 12/11/2021    Colorectal Cancer Screening 08/05/2025 ---    Hemoglobin A1c (Diabetic Prevention Screening) 12/14/2026 12/14/2023    Lipid Panel 12/14/2028 12/14/2023    TETANUS VACCINE 12/14/2030 12/14/2020    RSV Vaccine (Age 60+ and Pregnant patients) (1 - 1-dose 75+ series) 08/05/2040 ---          Review of Systems   Constitutional:  Negative for chills and fever.   Respiratory:  Negative for cough and shortness of breath.    Cardiovascular:  Negative for chest pain.   Neurological:  Negative for  "dizziness and headaches.       Objective:     /88 (BP Location: Left arm, Patient Position: Sitting)   Pulse 73   Temp 97.3 °F (36.3 °C) (Temporal)   Resp 18   Ht 5' 9" (1.753 m)   Wt 81.7 kg (180 lb 1.9 oz)   SpO2 99%   BMI 26.60 kg/m²     Physical Exam  Vitals reviewed.   Constitutional:       Appearance: Normal appearance.   HENT:      Head: Normocephalic and atraumatic.   Cardiovascular:      Rate and Rhythm: Normal rate and regular rhythm.      Heart sounds: Normal heart sounds. No murmur heard.  Pulmonary:      Effort: Pulmonary effort is normal.      Breath sounds: Normal breath sounds. No wheezing.   Skin:     General: Skin is warm and dry.   Neurological:      Mental Status: He is alert and oriented to person, place, and time.       BP Readings from Last 3 Encounters:   12/12/24 136/88   09/20/24 (!) 152/102   09/19/24 (!) 148/102     Wt Readings from Last 3 Encounters:   12/12/24 81.7 kg (180 lb 1.9 oz)   09/23/24 83.2 kg (183 lb 6.8 oz)   09/20/24 83.2 kg (183 lb 6.8 oz)       I reviewed and independently interpreted the labs and imaging below:  Last Labs:  Glucose   Date Value Ref Range Status   12/14/2023 87 70 - 110 mg/dL Final   06/06/2023 110 70 - 110 mg/dL Final     BUN   Date Value Ref Range Status   12/14/2023 18 6 - 20 mg/dL Final   06/06/2023 13 6 - 20 mg/dL Final     Creatinine   Date Value Ref Range Status   12/14/2023 0.9 0.5 - 1.4 mg/dL Final   06/06/2023 1.0 0.5 - 1.4 mg/dL Final     Sodium   Date Value Ref Range Status   12/14/2023 139 136 - 145 mmol/L Final     Potassium   Date Value Ref Range Status   12/14/2023 3.9 3.5 - 5.1 mmol/L Final     AST   Date Value Ref Range Status   08/08/2024 68 (H) 10 - 40 U/L Final     ALT   Date Value Ref Range Status   08/08/2024 95 (H) 10 - 44 U/L Final     eGFR if    Date Value Ref Range Status   06/30/2022 >60.0 >60 mL/min/1.73 m^2 Final     eGFR if non    Date Value Ref Range Status   06/30/2022 >60.0 >60 " mL/min/1.73 m^2 Final     Comment:     Calculation used to obtain the estimated glomerular filtration  rate (eGFR) is the CKD-EPI equation.        Cholesterol   Date Value Ref Range Status   12/14/2023 156 120 - 199 mg/dL Final     Comment:     The National Cholesterol Education Program (NCEP) has set the  following guidelines (reference ranges) for Cholesterol:  Optimal.....................<200 mg/dL  Borderline High.............200-239 mg/dL  High........................> or = 240 mg/dL     02/15/2022 303 (H) 120 - 199 mg/dL Final     Comment:     The National Cholesterol Education Program (NCEP) has set the  following guidelines (reference ranges) for Cholesterol:  Optimal.....................<200 mg/dL  Borderline High.............200-239 mg/dL  High........................> or = 240 mg/dL       Hemoglobin A1C   Date Value Ref Range Status   12/14/2023 5.0 4.0 - 5.6 % Final     Comment:     ADA Screening Guidelines:  5.7-6.4%  Consistent with prediabetes  >or=6.5%  Consistent with diabetes    High levels of fetal hemoglobin interfere with the HbA1C  assay. Heterozygous hemoglobin variants (HbS, HgC, etc)do  not significantly interfere with this assay.   However, presence of multiple variants may affect accuracy.     11/23/2021 5.4 4.0 - 5.6 % Final     Comment:     ADA Screening Guidelines:  5.7-6.4%  Consistent with prediabetes  >or=6.5%  Consistent with diabetes    High levels of fetal hemoglobin interfere with the HbA1C  assay. Heterozygous hemoglobin variants (HbS, HgC, etc)do  not significantly interfere with this assay.   However, presence of multiple variants may affect accuracy.       Lab Results   Component Value Date    WBC 6.20 12/14/2023    HGB 14.7 12/14/2023    HCT 44.9 12/14/2023     12/14/2023     Lab Results   Component Value Date    TSH 2.244 12/14/2023       Assessment and Plan:     1. Encounter for annual health examination (Primary)    --Nutrition: Discussed the importance of moderate  caffeine intake. Adhering to a low sodium, low saturated fat/low cholesterol diet.   --Exercise: Discussed the importance of regular exercise. At least 30 minutes 5 days per week.   --Immunizations reviewed.  --Discussed benefits of maintaining up to date health maintenance.    2. Primary hypertension    Chronic, stable, continue current medication      3. Mixed hyperlipidemia    Chronic, review labs from today when available.      The 10-year ASCVD risk score (Vimal GARCIA, et al., 2019) is: 7.2%    Values used to calculate the score:      Age: 59 years      Sex: Male      Is Non- : No      Diabetic: No      Tobacco smoker: No      Systolic Blood Pressure: 136 mmHg      Is BP treated: Yes      HDL Cholesterol: 59 mg/dL      Total Cholesterol: 156 mg/dL      4. Attention deficit hyperactivity disorder (ADHD), combined type    Chronic, stable, continue current medications    - dextroamphetamine-amphetamine (ADDERALL XR) 10 MG 24 hr capsule; 1 capsule every afternoon  Dispense: 30 capsule; Refill: 0  - dextroamphetamine-amphetamine (ADDERALL XR) 30 MG 24 hr capsule; Take 1 capsule (30 mg total) by mouth every morning.  Dispense: 30 capsule; Refill: 0  - dextroamphetamine-amphetamine (ADDERALL XR) 10 MG 24 hr capsule; 1 capsule every afternoon  Dispense: 30 capsule; Refill: 0  - dextroamphetamine-amphetamine (ADDERALL XR) 10 MG 24 hr capsule; 1 capsule every afternoon  Dispense: 30 capsule; Refill: 0  - dextroamphetamine-amphetamine (ADDERALL XR) 30 MG 24 hr capsule; Take 1 capsule (30 mg total) by mouth every morning.  Dispense: 30 capsule; Refill: 0  - dextroamphetamine-amphetamine (ADDERALL XR) 30 MG 24 hr capsule; Take 1 capsule (30 mg total) by mouth every morning.  Dispense: 30 capsule; Refill: 0    5. Erectile dysfunction, unspecified erectile dysfunction type    Chronic, stable, continue current medication    - tadalafiL (CIALIS) 5 MG tablet; Take 1 tablet (5 mg total) by mouth daily as  needed for Erectile Dysfunction.  Dispense: 90 tablet; Refill: 1    6. Encounter for colorectal cancer screening    - Cologuard Screening (Multitarget Stool DNA); Future  - Cologuard Screening (Multitarget Stool DNA)           This note was generated with the assistance of ambient listening technology. Verbal consent was obtained by the patient and accompanying visitor(s) for the recording of patient appointment to facilitate this note. I attest to having reviewed and edited the generated note for accuracy, though some syntax or spelling errors may persist. Please contact the author of this note for any clarification.

## 2025-01-14 ENCOUNTER — PATIENT MESSAGE (OUTPATIENT)
Dept: HEPATOLOGY | Facility: CLINIC | Age: 60
End: 2025-01-14

## 2025-01-14 DIAGNOSIS — K76.0 METABOLIC DYSFUNCTION-ASSOCIATED STEATOTIC LIVER DISEASE (MASLD): Primary | ICD-10-CM

## 2025-01-16 ENCOUNTER — PATIENT MESSAGE (OUTPATIENT)
Dept: INTERNAL MEDICINE | Facility: CLINIC | Age: 60
End: 2025-01-16

## 2025-03-13 ENCOUNTER — PATIENT MESSAGE (OUTPATIENT)
Dept: INTERNAL MEDICINE | Facility: CLINIC | Age: 60
End: 2025-03-13

## 2025-03-13 ENCOUNTER — OFFICE VISIT (OUTPATIENT)
Dept: INTERNAL MEDICINE | Facility: CLINIC | Age: 60
End: 2025-03-13

## 2025-03-13 VITALS
OXYGEN SATURATION: 99 % | HEART RATE: 90 BPM | BODY MASS INDEX: 27.04 KG/M2 | SYSTOLIC BLOOD PRESSURE: 168 MMHG | WEIGHT: 182.56 LBS | HEIGHT: 69 IN | TEMPERATURE: 97 F | RESPIRATION RATE: 18 BRPM | DIASTOLIC BLOOD PRESSURE: 102 MMHG

## 2025-03-13 DIAGNOSIS — I10 PRIMARY HYPERTENSION: ICD-10-CM

## 2025-03-13 DIAGNOSIS — F90.2 ATTENTION DEFICIT HYPERACTIVITY DISORDER (ADHD), COMBINED TYPE: Primary | ICD-10-CM

## 2025-03-13 DIAGNOSIS — N52.9 ERECTILE DYSFUNCTION, UNSPECIFIED ERECTILE DYSFUNCTION TYPE: ICD-10-CM

## 2025-03-13 PROCEDURE — 99999 PR PBB SHADOW E&M-EST. PATIENT-LVL IV: CPT | Mod: PBBFAC,,, | Performed by: NURSE PRACTITIONER

## 2025-03-13 PROCEDURE — 99214 OFFICE O/P EST MOD 30 MIN: CPT | Mod: PBBFAC,PO | Performed by: NURSE PRACTITIONER

## 2025-03-13 PROCEDURE — 99214 OFFICE O/P EST MOD 30 MIN: CPT | Mod: S$PBB,,, | Performed by: NURSE PRACTITIONER

## 2025-03-13 RX ORDER — DEXTROAMPHETAMINE SACCHARATE, AMPHETAMINE ASPARTATE MONOHYDRATE, DEXTROAMPHETAMINE SULFATE AND AMPHETAMINE SULFATE 2.5; 2.5; 2.5; 2.5 MG/1; MG/1; MG/1; MG/1
CAPSULE, EXTENDED RELEASE ORAL
Qty: 30 CAPSULE | Refills: 0 | Status: SHIPPED | OUTPATIENT
Start: 2025-03-13

## 2025-03-13 RX ORDER — TADALAFIL 5 MG/1
TABLET ORAL
Qty: 90 TABLET | Refills: 1 | Status: SHIPPED | OUTPATIENT
Start: 2025-03-13

## 2025-03-13 RX ORDER — DEXTROAMPHETAMINE SACCHARATE, AMPHETAMINE ASPARTATE MONOHYDRATE, DEXTROAMPHETAMINE SULFATE AND AMPHETAMINE SULFATE 7.5; 7.5; 7.5; 7.5 MG/1; MG/1; MG/1; MG/1
30 CAPSULE, EXTENDED RELEASE ORAL EVERY MORNING
Qty: 30 CAPSULE | Refills: 0 | Status: SHIPPED | OUTPATIENT
Start: 2025-05-13

## 2025-03-13 RX ORDER — DEXTROAMPHETAMINE SACCHARATE, AMPHETAMINE ASPARTATE MONOHYDRATE, DEXTROAMPHETAMINE SULFATE AND AMPHETAMINE SULFATE 2.5; 2.5; 2.5; 2.5 MG/1; MG/1; MG/1; MG/1
CAPSULE, EXTENDED RELEASE ORAL
Qty: 30 CAPSULE | Refills: 0 | Status: SHIPPED | OUTPATIENT
Start: 2025-05-13

## 2025-03-13 RX ORDER — DEXTROAMPHETAMINE SACCHARATE, AMPHETAMINE ASPARTATE MONOHYDRATE, DEXTROAMPHETAMINE SULFATE AND AMPHETAMINE SULFATE 2.5; 2.5; 2.5; 2.5 MG/1; MG/1; MG/1; MG/1
CAPSULE, EXTENDED RELEASE ORAL
Qty: 30 CAPSULE | Refills: 0 | Status: SHIPPED | OUTPATIENT
Start: 2025-04-13

## 2025-03-13 RX ORDER — DEXTROAMPHETAMINE SACCHARATE, AMPHETAMINE ASPARTATE MONOHYDRATE, DEXTROAMPHETAMINE SULFATE AND AMPHETAMINE SULFATE 7.5; 7.5; 7.5; 7.5 MG/1; MG/1; MG/1; MG/1
30 CAPSULE, EXTENDED RELEASE ORAL EVERY MORNING
Qty: 30 CAPSULE | Refills: 0 | Status: SHIPPED | OUTPATIENT
Start: 2025-03-13

## 2025-03-13 RX ORDER — DEXTROAMPHETAMINE SACCHARATE, AMPHETAMINE ASPARTATE MONOHYDRATE, DEXTROAMPHETAMINE SULFATE AND AMPHETAMINE SULFATE 7.5; 7.5; 7.5; 7.5 MG/1; MG/1; MG/1; MG/1
30 CAPSULE, EXTENDED RELEASE ORAL EVERY MORNING
Qty: 30 CAPSULE | Refills: 0 | Status: SHIPPED | OUTPATIENT
Start: 2025-04-13

## 2025-03-13 NOTE — TELEPHONE ENCOUNTER
No care due was identified.  [facility] Embedded Care Due Messages. Reference number: [id number].   3/13/2025 12:15:21 PM CDT

## 2025-03-13 NOTE — TELEPHONE ENCOUNTER
Refill Routing Note   Medication(s) are not appropriate for processing by [facility] for the following reason(s):        No active prescription written by provider    [facility] action(s):  Defer               Appointments  past 12m or future 3m with PCP    Date Provider   Last Visit   6/12/2024 Puma Solomon MD   Next Visit   Visit date not found Puma Solomon MD   ED visits in past 90 days: 0        Note composed:12:21 PM 03/13/2025

## 2025-03-13 NOTE — PROGRESS NOTES
Subjective:       Patient ID: Brice Byrd is a 59 y.o. male.    Chief Complaint: Follow-up (Rx) and Medication Refill      History of Present Illness    CHIEF COMPLAINT:  Mr. Byrd presents today for medication refill.    HYPERTENSION:  He reports elevated blood pressure reading of 168/102, attributed to two cups of coffee consumption, high-sodium dinner (ham), and traffic-related stress. He continues Mycartis 80mg but is not monitoring blood pressure at home.    CHRONIC PAIN:  He experiences multiple areas of pain including back, hands (arthritis and trigger fingers), and temporomandibular joint (TMJ). He denies knee pain. He manages pain with ibuprofen, taking it in the morning or afternoon when pain is most severe, reporting it as the most effective pain medication. The chronic pain impacts his daily activities.    ADHD:  He continues Adderall with reported effectiveness and no side effects.       ROS:  Cardiovascular: -chest pain  Musculoskeletal: +joint pain, +back pain, +body aches         Review of patient's allergies indicates:   Allergen Reactions    Statins-hmg-coa reductase inhibitors Other (See Comments)     nausea, HA, diarrhea, tiredness       Medication List with Changes/Refills   Current Medications    EVOLOCUMAB (REPATHA SURECLICK) 140 MG/ML PNIJ    Inject 1 mL (140 mg total) into the skin every 14 (fourteen) days.    TELMISARTAN (MICARDIS) 80 MG TAB    Take 1 tablet (80 mg total) by mouth once daily.   Changed and/or Refilled Medications    Modified Medication Previous Medication    DEXTROAMPHETAMINE-AMPHETAMINE (ADDERALL XR) 10 MG 24 HR CAPSULE dextroamphetamine-amphetamine (ADDERALL XR) 10 MG 24 hr capsule       1 capsule every afternoon    1 capsule every afternoon    DEXTROAMPHETAMINE-AMPHETAMINE (ADDERALL XR) 10 MG 24 HR CAPSULE dextroamphetamine-amphetamine (ADDERALL XR) 10 MG 24 hr capsule       1 capsule every afternoon    1 capsule every afternoon    DEXTROAMPHETAMINE-AMPHETAMINE  "(ADDERALL XR) 10 MG 24 HR CAPSULE dextroamphetamine-amphetamine (ADDERALL XR) 10 MG 24 hr capsule       1 capsule every afternoon    1 capsule every afternoon    DEXTROAMPHETAMINE-AMPHETAMINE (ADDERALL XR) 30 MG 24 HR CAPSULE dextroamphetamine-amphetamine (ADDERALL XR) 30 MG 24 hr capsule       Take 1 capsule (30 mg total) by mouth every morning.    Take 1 capsule (30 mg total) by mouth every morning.    DEXTROAMPHETAMINE-AMPHETAMINE (ADDERALL XR) 30 MG 24 HR CAPSULE dextroamphetamine-amphetamine (ADDERALL XR) 30 MG 24 hr capsule       Take 1 capsule (30 mg total) by mouth every morning.    Take 1 capsule (30 mg total) by mouth every morning.    DEXTROAMPHETAMINE-AMPHETAMINE (ADDERALL XR) 30 MG 24 HR CAPSULE dextroamphetamine-amphetamine (ADDERALL XR) 30 MG 24 hr capsule       Take 1 capsule (30 mg total) by mouth every morning.    Take 1 capsule (30 mg total) by mouth every morning.    TADALAFIL (CIALIS) 5 MG TABLET tadalafiL (CIALIS) 5 MG tablet       TAKE ONE TABLET BY MOUTH EVERY DAY AS NEEDED ERECTILE DYSFUNCTION    Take 1 tablet (5 mg total) by mouth daily as needed for Erectile Dysfunction.       Objective:   BP (!) 168/102   Pulse 90   Temp 97.4 °F (36.3 °C) (Temporal)   Resp 18   Ht 5' 9" (1.753 m)   Wt 82.8 kg (182 lb 8.7 oz)   SpO2 99%   BMI 26.96 kg/m²     Physical Exam  Vitals reviewed.   Constitutional:       Appearance: Normal appearance.   HENT:      Head: Normocephalic and atraumatic.   Pulmonary:      Effort: Pulmonary effort is normal.   Neurological:      Mental Status: He is alert and oriented to person, place, and time.         Assessment and Plan:     Assessed patient's blood pressure, which was elevated at 168/102  Considered potential causes for elevated BP: recent salt intake, caffeine consumption, traffic-related stress  Evaluated current Mycardis dose (80 mg) and potential need for increase up to 160 mg based on home BP readings  Considered possibility of decreasing Adderall dose " due to its stimulant effect on blood pressure    1. Attention deficit hyperactivity disorder (ADHD), combined type (Primary)    Chronic, stable, continue current medications    - dextroamphetamine-amphetamine (ADDERALL XR) 30 MG 24 hr capsule; Take 1 capsule (30 mg total) by mouth every morning.  Dispense: 30 capsule; Refill: 0  - dextroamphetamine-amphetamine (ADDERALL XR) 10 MG 24 hr capsule; 1 capsule every afternoon  Dispense: 30 capsule; Refill: 0  - dextroamphetamine-amphetamine (ADDERALL XR) 10 MG 24 hr capsule; 1 capsule every afternoon  Dispense: 30 capsule; Refill: 0  - dextroamphetamine-amphetamine (ADDERALL XR) 30 MG 24 hr capsule; Take 1 capsule (30 mg total) by mouth every morning.  Dispense: 30 capsule; Refill: 0  - dextroamphetamine-amphetamine (ADDERALL XR) 30 MG 24 hr capsule; Take 1 capsule (30 mg total) by mouth every morning.  Dispense: 30 capsule; Refill: 0  - dextroamphetamine-amphetamine (ADDERALL XR) 10 MG 24 hr capsule; 1 capsule every afternoon  Dispense: 30 capsule; Refill: 0    2. Primary hypertension    - Continued Mycardis 80 mg for blood pressure management.  - Recommend reducing salt intake.        FOLLOW-UP:  - Instructed the patient to follow up in 1 week with home blood pressure readings via patient portal message.          This note was generated with the assistance of ambient listening technology. Verbal consent was obtained by the patient and accompanying visitor(s) for the recording of patient appointment to facilitate this note. I attest to having reviewed and edited the generated note for accuracy, though some syntax or spelling errors may persist. Please contact the author of this note for any clarification.

## 2025-03-24 VITALS — DIASTOLIC BLOOD PRESSURE: 78 MMHG | SYSTOLIC BLOOD PRESSURE: 132 MMHG

## 2025-05-19 ENCOUNTER — OFFICE VISIT (OUTPATIENT)
Dept: SPORTS MEDICINE | Facility: CLINIC | Age: 60
End: 2025-05-19
Payer: OTHER GOVERNMENT

## 2025-05-19 ENCOUNTER — HOSPITAL ENCOUNTER (OUTPATIENT)
Dept: RADIOLOGY | Facility: HOSPITAL | Age: 60
Discharge: HOME | End: 2025-05-19
Attending: FAMILY MEDICINE
Payer: OTHER GOVERNMENT

## 2025-05-19 VITALS
SYSTOLIC BLOOD PRESSURE: 181 MMHG | BODY MASS INDEX: 27.1 KG/M2 | HEART RATE: 81 BPM | WEIGHT: 182.94 LBS | HEIGHT: 69 IN | DIASTOLIC BLOOD PRESSURE: 104 MMHG

## 2025-05-19 DIAGNOSIS — M22.42 CHONDROMALACIA, PATELLA, LEFT: Primary | ICD-10-CM

## 2025-05-19 DIAGNOSIS — M51.369 DEGENERATION OF INTERVERTEBRAL DISC OF LUMBAR REGION, UNSPECIFIED WHETHER PAIN PRESENT: Primary | ICD-10-CM

## 2025-05-19 DIAGNOSIS — M17.12 PRIMARY OSTEOARTHRITIS OF LEFT KNEE: ICD-10-CM

## 2025-05-19 DIAGNOSIS — M25.562 LEFT KNEE PAIN, UNSPECIFIED CHRONICITY: ICD-10-CM

## 2025-05-19 PROCEDURE — 99204 OFFICE O/P NEW MOD 45 MIN: CPT | Mod: 25,S$PBB,, | Performed by: FAMILY MEDICINE

## 2025-05-19 PROCEDURE — 73564 X-RAY EXAM KNEE 4 OR MORE: CPT | Mod: 26,50,, | Performed by: RADIOLOGY

## 2025-05-19 PROCEDURE — 99999PBSHW PR PBB SHADOW TECHNICAL ONLY FILED TO HB: Mod: PBBFAC,,,

## 2025-05-19 PROCEDURE — 20611 DRAIN/INJ JOINT/BURSA W/US: CPT | Mod: PBBFAC | Performed by: FAMILY MEDICINE

## 2025-05-19 PROCEDURE — 73564 X-RAY EXAM KNEE 4 OR MORE: CPT | Mod: TC,50

## 2025-05-19 PROCEDURE — 99214 OFFICE O/P EST MOD 30 MIN: CPT | Mod: PBBFAC,25 | Performed by: FAMILY MEDICINE

## 2025-05-19 PROCEDURE — 99999 PR PBB SHADOW E&M-EST. PATIENT-LVL IV: CPT | Mod: PBBFAC,,, | Performed by: FAMILY MEDICINE

## 2025-05-19 RX ORDER — TRIAMCINOLONE ACETONIDE 40 MG/ML
40 INJECTION, SUSPENSION INTRA-ARTICULAR; INTRAMUSCULAR
Status: DISCONTINUED | OUTPATIENT
Start: 2025-05-19 | End: 2025-05-19 | Stop reason: HOSPADM

## 2025-05-19 RX ADMIN — TRIAMCINOLONE ACETONIDE 40 MG: 40 INJECTION, SUSPENSION INTRA-ARTICULAR; INTRAMUSCULAR at 02:05

## 2025-05-19 NOTE — PROGRESS NOTES
CHIEF COMPLAINT (CC):   #1 Knee pain, left    HISTORY OF PRESENT ILLNESS (HPI):   Brice admits to left anterolateral knee pain beginning 1 year ago he initially thought was associated with prior history of low back pain 2 years ago, but then notes acutely worsening pain 2 weeks ago.     #1  Location: anterior, anterolateral   Onset duration: insidious over the past 1 year, worsening over the past 2 weeks  Palliative modifying factors: relative rest, OTC analgesics  Provocative modifying factors: prolonged ambulation, kneeling  Prior: none  Progression: plateau discomfort  Quality: sharp pain  Radiation [+associated signs and symptoms]:   Severity: per nursing documentation  Timing: intermittent w/ use  Trauma: none  AAFP/FPM: Pocket Guide Documentation Guidelines, (c)2014    (04577=8+, 58380=4+)    Review of systems (ROS):   A 10+ review of systems was performed with pertinent positives and negatives noted above in the history of present illness. Other systems were negative unless otherwise specified.    PHYSICAL EXAMINATION (PE):   General:  The patient is alert and oriented x 3. Mood is pleasant. Observation of ears, eyes and nose reveal no gross abnormalities. HEENT: NCAT, sclera anicteric.   Lungs: Respirations are equal and unlabored.  Gait is coordinated. Patient can toe walk and heel walk without difficulty.    KNEE EXAMINATION    Observation/Inspection  Gait:   Antalgic   Alignment:  Neutral   Scars:   None   Muscle atrophy: Mild  Effusion:  None   Warmth:  None   Discoloration:   none     Tenderness / Crepitus (T / C):         T / C      T / C  Patella   + / -   Lateral joint line   - / -     Peripatellar medial  -  Medial joint line    - / -  Peripatellar lateral +  Medial plica   - / -  Patellar tendon -   Popliteal fossa   - / -  Quad tendon   -   Gastrocnemius   -  Prepatellar Bursa - / -   Quadricep   -  Tibial tubercle  -  Thigh/hamstring  -  Pes anserine/HS -  Fibula    -  ITB   - / -  Tibia      -  Tib/fib joint  - / -  LCL    -    MFC   - / -   MCL: Proximal  -    LFC   - / -   Distal    -          ROM: (* = pain)  PASSIVE   ACTIVE    Left :   5 / 0 / 145   5 / 0 / 145     Right :    5 / 0 / 145   5 / 0 / 145    Patellofemoral examination:  See above noted areas of tenderness.   Patella position    Subluxation / dislocation: Centered        Sup. / Inf;   Normal   Crepitus (PF):    Absent   Patellar Mobility:       Medial-lateral:   Normal    Superior-inferior:  Normal    Inferior tilt   Normal    Patellar tendon:  Normal   Lateral tilt:    Normal   J-sign:     None   Patellofemoral grind:   No pain     Meniscal Signs:     Pain on terminal extension:  +  Pain on terminal flexion:  +  Michaels maneuver:  -  Squat     NT  Thesaly    NT    Ligament Examination:  ACL / Lachman:  WNL  PCL-Post.  drawer: normal 0 to 2mm  MCL- Valgus:  normal 0 to 2mm  LCL- Varus:    normal 0 to 2mm  Pivot shift:  guarding   Dial Test:   difference c/w other side   At 30° flexion: normal (< 5°)    At 90° flexion: normal (< 5°)   Reverse Pivot Shift:   normal (Equal)     Strength: (* = with pain) Painful Side  Quadriceps   5/5  Hamstrin/5    Extremity Neuro-vascular Examination:   Sensation:  Grossly intact to light touch all dermatomal regions.   Motor Function:  Fully intact motor function at hip, knee, foot and ankle    DTRs;  quadriceps and  achilles 2+.  No clonus and downgoing Babinski.    Vascular status:  DP and PT pulses 2+, brisk capillary refill, symmetric.     Other Findings:     ASSESSMENT & PLAN:   Assessment  #1 Suspected lateral patellar facet articular cartilage thinning, left    No evidence of neurologic pathology  No evidence of vascular pathology    Imaging studies reviewed:   X-ray knee, bilat 25.05    Plan  We discussed the importance of appropriate diet, weight, and regular exercise    We discussed options including    Watchful waiting / relative rest    Physical therapy    Injection therapy  CSI iaKnee left   Consultation    The patient chooses As above   x = prescribed  CSI = corticosteroid injection  VSI = viscosupplement injection  PRPI = platelet rich plasma injection  ia = intra articular  R = right  L = left  B = bilateral   nfSx = surgical consultation was recommended, but patient is not interested in consultation at this time    Physical Therapy        Formal (fPT), @ [facility] facility    Formal (fPT), @ [facility] facility        Homegoing (hgPT), per concurrent fPT recommendations    Homegoing (hgPT), per prior fPT recommendations    Homegoing (hgPT), handout provided        w/  (atPT)    [blank] = not prescribed  x = prescribed  b = prescribed, and begin as indicated  t = continue as indicated  r = prescribed, and restart as indicated  p = completed prior as indicated  hs = prescribed, and with high school   col = prescribed, and with college or university   nfPT = physical therapy was recommended, but patient is not interested in PT at this time    Activity (e.g. sports, work) restrictions Discussed modification of kneeling activities   [blank] = as tolerated  pt = per physical therapist  at = per   NWB = non weight bearing on affected lower extremity, with crutches assistance for ambulation    Bracing    [blank] = not prescribed  r = recommended, but not fit with at todays visit  f = prescribed and fit with at todays visit  t = continue as indicated  d = d/c  p = as needed  rare = use on rare, as-needed basis; advised against chronic use    Pain management    [blank] = No prescription necessary. A handout detailing dosing of appropriate   over-the-counter musculoskeletal analgesics was made available to the patient.   m = meloxicam x 14 days  mp = 14 day course of meloxicam prescribed prior    Follow up 10 days via [facility]  How was Misha?   [blank] = as needed  [number] = in [number] weeks  CSI = for corticosteroid injection  VSI  = for viscosupplement injection or injection series  PRP = for platelet rich plasma injection or injection series  MRI = after MRI imaging  ns = should surgical options be deferred (no surgery)  o = appointment offered, deferred by patient    Should symptoms worsen or fail to resolve, consider    Revisiting the above options and / or MRI     Vocation:

## 2025-05-19 NOTE — PROCEDURES
"Large Joint Aspiration/Injection: L knee    Date/Time: 5/19/2025 2:00 PM    Performed by: Michael Hinds MD  Authorized by: Michael Hinds MD    Consent Done?:  Yes (Verbal)  Indications:  Pain  Site marked: the procedure site was marked    Timeout: prior to procedure the correct patient, procedure, and site was verified    Prep: patient was prepped and draped in usual sterile fashion      Details:  Needle Size:  25 G  Ultrasonic Guidance for needle placement?: Yes    Images are saved and documented.  Approach:  Lateral  Location:  Knee  Site:  L knee  Medications:  40 mg triamcinolone acetonide 40 mg/mL  Patient tolerance:  Patient tolerated the procedure well with no immediate complications     Description of ultrasound utilization for needle guidance:   Ultrasound guidance used for needle localization. Images saved and stored for documentation. The SUPRAPATELLAR BURSA / KNEE JOINT was visualized. Dynamic visualization of the 25g x 1.5" needle was continuous throughout the procedure.     "

## 2025-05-20 ENCOUNTER — OFFICE VISIT (OUTPATIENT)
Dept: ORTHOPEDICS | Facility: CLINIC | Age: 60
End: 2025-05-20
Payer: OTHER GOVERNMENT

## 2025-05-20 VITALS — HEIGHT: 69 IN | WEIGHT: 178.38 LBS | BODY MASS INDEX: 26.42 KG/M2

## 2025-05-20 DIAGNOSIS — Z98.890 S/P LUMBAR DISCECTOMY: Primary | ICD-10-CM

## 2025-05-20 PROCEDURE — 99213 OFFICE O/P EST LOW 20 MIN: CPT | Mod: PBBFAC | Performed by: ORTHOPAEDIC SURGERY

## 2025-05-20 PROCEDURE — 99213 OFFICE O/P EST LOW 20 MIN: CPT | Mod: S$PBB,,, | Performed by: ORTHOPAEDIC SURGERY

## 2025-05-20 PROCEDURE — 99999 PR PBB SHADOW E&M-EST. PATIENT-LVL III: CPT | Mod: PBBFAC,,, | Performed by: ORTHOPAEDIC SURGERY

## 2025-05-22 ENCOUNTER — HOSPITAL ENCOUNTER (OUTPATIENT)
Dept: RADIOLOGY | Facility: HOSPITAL | Age: 60
Discharge: HOME | End: 2025-05-22
Attending: ORTHOPAEDIC SURGERY
Payer: OTHER GOVERNMENT

## 2025-05-22 DIAGNOSIS — M51.369 DEGENERATION OF INTERVERTEBRAL DISC OF LUMBAR REGION, UNSPECIFIED WHETHER PAIN PRESENT: ICD-10-CM

## 2025-05-22 PROCEDURE — 72110 X-RAY EXAM L-2 SPINE 4/>VWS: CPT | Mod: TC,PO

## 2025-05-22 PROCEDURE — 72110 X-RAY EXAM L-2 SPINE 4/>VWS: CPT | Mod: 26,,, | Performed by: RADIOLOGY

## 2025-05-29 ENCOUNTER — PATIENT MESSAGE (OUTPATIENT)
Dept: SPORTS MEDICINE | Facility: CLINIC | Age: 60
End: 2025-05-29
Payer: OTHER GOVERNMENT

## 2025-05-29 ENCOUNTER — PATIENT MESSAGE (OUTPATIENT)
Dept: ORTHOPEDICS | Facility: CLINIC | Age: 60
End: 2025-05-29
Payer: OTHER GOVERNMENT

## 2025-05-29 DIAGNOSIS — M22.42 CHONDROMALACIA, PATELLA, LEFT: Primary | ICD-10-CM

## 2025-06-10 ENCOUNTER — HOSPITAL ENCOUNTER (OUTPATIENT)
Dept: RADIOLOGY | Facility: HOSPITAL | Age: 60
Discharge: HOME | End: 2025-06-10
Attending: FAMILY MEDICINE
Payer: OTHER GOVERNMENT

## 2025-06-10 DIAGNOSIS — M22.42 CHONDROMALACIA, PATELLA, LEFT: ICD-10-CM

## 2025-06-10 PROCEDURE — 73721 MRI JNT OF LWR EXTRE W/O DYE: CPT | Mod: 26,LT,, | Performed by: RADIOLOGY

## 2025-06-10 PROCEDURE — 73721 MRI JNT OF LWR EXTRE W/O DYE: CPT | Mod: TC,LT

## 2025-06-11 ENCOUNTER — TELEPHONE (OUTPATIENT)
Dept: SPORTS MEDICINE | Facility: CLINIC | Age: 60
End: 2025-06-11
Payer: OTHER GOVERNMENT

## 2025-06-11 ENCOUNTER — TELEPHONE (OUTPATIENT)
Dept: SPORTS MEDICINE | Facility: CLINIC | Age: 60
End: 2025-06-11

## 2025-06-11 ENCOUNTER — OFFICE VISIT (OUTPATIENT)
Dept: SPORTS MEDICINE | Facility: CLINIC | Age: 60
End: 2025-06-11
Payer: OTHER GOVERNMENT

## 2025-06-11 VITALS
SYSTOLIC BLOOD PRESSURE: 187 MMHG | BODY MASS INDEX: 27.13 KG/M2 | HEART RATE: 83 BPM | WEIGHT: 183.19 LBS | HEIGHT: 69 IN | DIASTOLIC BLOOD PRESSURE: 148 MMHG

## 2025-06-11 DIAGNOSIS — M22.42 CHONDROMALACIA, PATELLA, LEFT: ICD-10-CM

## 2025-06-11 DIAGNOSIS — G89.29 CHRONIC PAIN OF LEFT KNEE: ICD-10-CM

## 2025-06-11 DIAGNOSIS — M17.12 PRIMARY OSTEOARTHRITIS OF LEFT KNEE: Primary | ICD-10-CM

## 2025-06-11 DIAGNOSIS — M25.562 CHRONIC PAIN OF LEFT KNEE: ICD-10-CM

## 2025-06-11 PROCEDURE — 99214 OFFICE O/P EST MOD 30 MIN: CPT | Mod: S$PBB,,, | Performed by: FAMILY MEDICINE

## 2025-06-11 PROCEDURE — 99213 OFFICE O/P EST LOW 20 MIN: CPT | Mod: PBBFAC | Performed by: FAMILY MEDICINE

## 2025-06-11 PROCEDURE — 99999 PR PBB SHADOW E&M-EST. PATIENT-LVL III: CPT | Mod: PBBFAC,,, | Performed by: FAMILY MEDICINE

## 2025-06-11 NOTE — TELEPHONE ENCOUNTER
LM with patient to address blood pressure concerns. Asked that pt retake his BP at home.     Michelle Madrid, MS, ATC, OTC  Clinical Assistant, [facility]

## 2025-06-11 NOTE — PROGRESS NOTES
CHIEF COMPLAINT (CC):   #1 Knee pain, left    HISTORY OF PRESENT ILLNESS (HPI):     6/11/25  Brice Byrd, a 59 y.o. male, presents today for follow up evaluation of his left knee MRI. At last appointment, 5/19/2025, patient underwent intra-articular knee CSI & pain/symptoms did not improve. Pain is 4/10 at present & up to severe with after provacative activity including walking. Patient has not been doing physical therapy.Patient reports the pain in his left knee is on and off. He is unable to place any pressure on his left knee including kneeling. He reports his knee feels swollen and stiff.     5/19/25  Brice admits to left anterolateral knee pain beginning 1 year ago he initially thought was associated with prior history of low back pain 2 years ago, but then notes acutely worsening pain 2 weeks ago.   #1  Location: anterior, anterolateral   Onset duration: insidious over the past 1 year, worsening over the past 2 weeks  Palliative modifying factors: relative rest, OTC analgesics  Provocative modifying factors: prolonged ambulation, kneeling  Prior: none  Progression: plateau discomfort  Quality: sharp pain  Radiation [+associated signs and symptoms]:   Severity: per nursing documentation  Timing: intermittent w/ use  Trauma: none  AAFP/FPM: Pocket Guide Documentation Guidelines, (c)2014    (64068=9+, 35574=1+)    Review of systems (ROS):   A 10+ review of systems was performed with pertinent positives and negatives noted above in the history of present illness. Other systems were negative unless otherwise specified.    PHYSICAL EXAMINATION (PE):   General:  The patient is alert and oriented x 3. Mood is pleasant. Observation of ears, eyes and nose reveal no gross abnormalities. HEENT: NCAT, sclera anicteric.   Lungs: Respirations are equal and unlabored.  Gait is coordinated. Patient can toe walk and heel walk without difficulty.    KNEE EXAMINATION    Observation/Inspection  Gait:   Antalgic    Alignment:  Neutral   Scars:   None   Muscle atrophy: Mild  Effusion:  None   Warmth:  None   Discoloration:   none     Tenderness / Crepitus (T / C):         T / C      T / C  Patella   + / -   Lateral joint line   - / -     Peripatellar medial  -  Medial joint line    - / -  Peripatellar lateral +  Medial plica   - / -  Patellar tendon -   Popliteal fossa   - / -  Quad tendon   -   Gastrocnemius   -  Prepatellar Bursa - / -   Quadricep   -  Tibial tubercle  -  Thigh/hamstring  -  Pes anserine/HS -  Fibula    -  ITB   - / -  Tibia     -  Tib/fib joint  - / -  LCL    -    MFC   - / -   MCL: Proximal  -    LFC   - / -   Distal    -          ROM: (* = pain)  PASSIVE   ACTIVE    Left :   5 / 0 / 145   5 / 0 / 145     Right :    5 / 0 / 145   5 / 0 / 145    Patellofemoral examination:  See above noted areas of tenderness.   Patella position    Subluxation / dislocation: Centered        Sup. / Inf;   Normal   Crepitus (PF):    Absent   Patellar Mobility:       Medial-lateral:   Normal    Superior-inferior:  Normal    Inferior tilt   Normal    Patellar tendon:  Normal   Lateral tilt:    Normal   J-sign:     None   Patellofemoral grind:   No pain     Meniscal Signs:     Pain on terminal extension:  +  Pain on terminal flexion:  +  Michaels maneuver:  -  Squat     NT  Thesaly    NT    Ligament Examination:  ACL / Lachman:  WNL  PCL-Post.  drawer: normal 0 to 2mm  MCL- Valgus:  normal 0 to 2mm  LCL- Varus:    normal 0 to 2mm  Pivot shift:  guarding   Dial Test:   difference c/w other side   At 30° flexion: normal (< 5°)    At 90° flexion: normal (< 5°)   Reverse Pivot Shift:   normal (Equal)     Strength: (* = with pain) Painful Side  Quadriceps   5/5  Hamstrin/5    Extremity Neuro-vascular Examination:   Sensation:  Grossly intact to light touch all dermatomal regions.   Motor Function:  Fully intact motor function at hip, knee, foot and ankle    DTRs;  quadriceps and  achilles 2+.  No clonus and downgoing  Babinski.    Vascular status:  DP and PT pulses 2+, brisk capillary refill, symmetric.     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, left    No evidence of neurologic pathology  No evidence of vascular pathology    Imaging studies reviewed:   X-ray knee, bilat 25.05  MRI knee left 25.06    Plan  We discussed the importance of appropriate diet, weight, and regular exercise    We discussed options including    Watchful waiting / relative rest    Physical therapy    Injection therapy VSI iaKnee left   Consultation    The patient chooses As above   x = prescribed  CSI = corticosteroid injection  VSI = viscosupplement injection  PRPI = platelet rich plasma injection  ia = intra articular  R = right  L = left  B = bilateral   nfSx = surgical consultation was recommended, but patient is not interested in consultation at this time    Physical Therapy        Formal (fPT), @ [facility] facility    Formal (fPT), @ [facility] facility        Homegoing (hgPT), per concurrent fPT recommendations    Homegoing (hgPT), per prior fPT recommendations    Homegoing (hgPT), handout provided        w/  (atPT)    [blank] = not prescribed  x = prescribed  b = prescribed, and begin as indicated  t = continue as indicated  r = prescribed, and restart as indicated  p = completed prior as indicated  hs = prescribed, and with high school   col = prescribed, and with college or university   nfPT = physical therapy was recommended, but patient is not interested in PT at this time    Activity (e.g. sports, work) restrictions Discussed modification of kneeling activities   [blank] = as tolerated  pt = per physical therapist  at = per   NWB = non weight bearing on affected lower extremity, with crutches assistance for ambulation    Bracing    [blank] = not prescribed  r = recommended, but not fit with at todays visit  f  = prescribed and fit with at todays visit  t = continue as indicated  d = d/c  p = as needed  rare = use on rare, as-needed basis; advised against chronic use    Pain management    [blank] = No prescription necessary. A handout detailing dosing of appropriate   over-the-counter musculoskeletal analgesics was made available to the patient.   m = meloxicam x 14 days  mp = 14 day course of meloxicam prescribed prior    Follow up    [blank] = as needed  [number] = in [number] weeks  CSI = for corticosteroid injection  VSI = for viscosupplement injection or injection series  PRP = for platelet rich plasma injection or injection series  MRI = after MRI imaging  ns = should surgical options be deferred (no surgery)  o = appointment offered, deferred by patient    Should symptoms worsen or fail to resolve, consider    Revisiting the above options and / or Consultation w/ ortho knee sx     Vocation:

## 2025-06-11 NOTE — TELEPHONE ENCOUNTER
Called and spoke to patient in regards to his blood pressure reading of 187/148. He reports he has a history of high blood pressure and has an appointment oin Friday with his PCP in regards to his high BP. It was asked for him to re take his blood pressure and to send his new vitals via my chart. Patient was compliant and agreed to send his new bp reading.     Jessica Uribe, OTC  Clinical Assistant to Dr. Savannah Smith, DO [facility]

## 2025-06-11 NOTE — TELEPHONE ENCOUNTER
----- Message from Michael Hinds MD sent at 6/11/2025 12:17 PM CDT -----  This gentleman had a BP reading that is (almost) too high to believe. Would you please call him and ask him to grab an accurate reading on his own somewhere and report that finding back to us.

## 2025-06-12 ENCOUNTER — PATIENT MESSAGE (OUTPATIENT)
Dept: SPORTS MEDICINE | Facility: CLINIC | Age: 60
End: 2025-06-12
Payer: OTHER GOVERNMENT

## 2025-06-13 ENCOUNTER — OFFICE VISIT (OUTPATIENT)
Dept: INTERNAL MEDICINE | Facility: CLINIC | Age: 60
End: 2025-06-13
Payer: OTHER GOVERNMENT

## 2025-06-13 VITALS
HEART RATE: 94 BPM | BODY MASS INDEX: 27.07 KG/M2 | TEMPERATURE: 98 F | RESPIRATION RATE: 18 BRPM | WEIGHT: 182.75 LBS | SYSTOLIC BLOOD PRESSURE: 160 MMHG | HEIGHT: 69 IN | DIASTOLIC BLOOD PRESSURE: 112 MMHG | OXYGEN SATURATION: 99 %

## 2025-06-13 DIAGNOSIS — I10 PRIMARY HYPERTENSION: ICD-10-CM

## 2025-06-13 DIAGNOSIS — F90.2 ATTENTION DEFICIT HYPERACTIVITY DISORDER (ADHD), COMBINED TYPE: Primary | ICD-10-CM

## 2025-06-13 PROCEDURE — 99214 OFFICE O/P EST MOD 30 MIN: CPT | Mod: PBBFAC,PO | Performed by: NURSE PRACTITIONER

## 2025-06-13 PROCEDURE — 99999 PR PBB SHADOW E&M-EST. PATIENT-LVL IV: CPT | Mod: PBBFAC,,, | Performed by: NURSE PRACTITIONER

## 2025-06-13 RX ORDER — DEXTROAMPHETAMINE SACCHARATE, AMPHETAMINE ASPARTATE MONOHYDRATE, DEXTROAMPHETAMINE SULFATE AND AMPHETAMINE SULFATE 7.5; 7.5; 7.5; 7.5 MG/1; MG/1; MG/1; MG/1
30 CAPSULE, EXTENDED RELEASE ORAL EVERY MORNING
Qty: 30 CAPSULE | Refills: 0 | Status: SHIPPED | OUTPATIENT
Start: 2025-08-13

## 2025-06-13 RX ORDER — AMLODIPINE BESYLATE 2.5 MG/1
2.5 TABLET ORAL DAILY
Qty: 90 TABLET | Refills: 3 | Status: SHIPPED | OUTPATIENT
Start: 2025-06-13 | End: 2026-06-13

## 2025-06-13 RX ORDER — DEXTROAMPHETAMINE SACCHARATE, AMPHETAMINE ASPARTATE MONOHYDRATE, DEXTROAMPHETAMINE SULFATE AND AMPHETAMINE SULFATE 7.5; 7.5; 7.5; 7.5 MG/1; MG/1; MG/1; MG/1
30 CAPSULE, EXTENDED RELEASE ORAL EVERY MORNING
Qty: 30 CAPSULE | Refills: 0 | Status: SHIPPED | OUTPATIENT
Start: 2025-06-13

## 2025-06-13 RX ORDER — DEXTROAMPHETAMINE SACCHARATE, AMPHETAMINE ASPARTATE MONOHYDRATE, DEXTROAMPHETAMINE SULFATE AND AMPHETAMINE SULFATE 2.5; 2.5; 2.5; 2.5 MG/1; MG/1; MG/1; MG/1
CAPSULE, EXTENDED RELEASE ORAL
Qty: 30 CAPSULE | Refills: 0 | Status: SHIPPED | OUTPATIENT
Start: 2025-08-13

## 2025-06-13 RX ORDER — TELMISARTAN 80 MG/1
80 TABLET ORAL DAILY
Qty: 90 TABLET | Refills: 3 | Status: SHIPPED | OUTPATIENT
Start: 2025-06-13 | End: 2026-06-13

## 2025-06-13 RX ORDER — DEXTROAMPHETAMINE SACCHARATE, AMPHETAMINE ASPARTATE MONOHYDRATE, DEXTROAMPHETAMINE SULFATE AND AMPHETAMINE SULFATE 2.5; 2.5; 2.5; 2.5 MG/1; MG/1; MG/1; MG/1
CAPSULE, EXTENDED RELEASE ORAL
Qty: 30 CAPSULE | Refills: 0 | Status: SHIPPED | OUTPATIENT
Start: 2025-06-13

## 2025-06-13 RX ORDER — DEXTROAMPHETAMINE SACCHARATE, AMPHETAMINE ASPARTATE MONOHYDRATE, DEXTROAMPHETAMINE SULFATE AND AMPHETAMINE SULFATE 7.5; 7.5; 7.5; 7.5 MG/1; MG/1; MG/1; MG/1
30 CAPSULE, EXTENDED RELEASE ORAL EVERY MORNING
Qty: 30 CAPSULE | Refills: 0 | Status: SHIPPED | OUTPATIENT
Start: 2025-09-13

## 2025-06-13 RX ORDER — DEXTROAMPHETAMINE SACCHARATE, AMPHETAMINE ASPARTATE MONOHYDRATE, DEXTROAMPHETAMINE SULFATE AND AMPHETAMINE SULFATE 2.5; 2.5; 2.5; 2.5 MG/1; MG/1; MG/1; MG/1
CAPSULE, EXTENDED RELEASE ORAL
Qty: 30 CAPSULE | Refills: 0 | Status: SHIPPED | OUTPATIENT
Start: 2025-07-13

## 2025-06-13 NOTE — PROGRESS NOTES
Subjective:       Patient ID: Brice Byrd is a 59 y.o. male.    Chief Complaint: Follow-up (3 month ), Generalized Body Aches, and Hypertension    Patient is a 59 y.o. male who traditionally follows with Puma Solomon MD presenting today for routine follow up on ADHD medication. Notes medications are working well and his symptoms are stable without medication side effects.     Has been taking 80 Micardis daily and tried increasing dose to BID without improvement.   BP at home has been:   149/102  156/102  Was on chlorthalidone and Norvasc in the past.     Patient has been having knee pain over 1 year.   Saw ortho on 6/11 - plan for viscous injections and PT.     Review of patient's allergies indicates:   Allergen Reactions    Statins-hmg-coa reductase inhibitors Other (See Comments)     nausea, HA, diarrhea, tiredness       Medication List with Changes/Refills   New Medications    AMLODIPINE (NORVASC) 2.5 MG TABLET    Take 1 tablet (2.5 mg total) by mouth once daily.   Current Medications    EVOLOCUMAB (REPATHA SURECLICK) 140 MG/ML PNIJ    Inject 1 mL (140 mg total) into the skin every 14 (fourteen) days.    TADALAFIL (CIALIS) 5 MG TABLET    TAKE ONE TABLET BY MOUTH EVERY DAY AS NEEDED ERECTILE DYSFUNCTION   Changed and/or Refilled Medications    Modified Medication Previous Medication    DEXTROAMPHETAMINE-AMPHETAMINE (ADDERALL XR) 10 MG 24 HR CAPSULE dextroamphetamine-amphetamine (ADDERALL XR) 10 MG 24 hr capsule       1 capsule every afternoon    1 capsule every afternoon    DEXTROAMPHETAMINE-AMPHETAMINE (ADDERALL XR) 10 MG 24 HR CAPSULE dextroamphetamine-amphetamine (ADDERALL XR) 10 MG 24 hr capsule       1 capsule every afternoon    1 capsule every afternoon    DEXTROAMPHETAMINE-AMPHETAMINE (ADDERALL XR) 10 MG 24 HR CAPSULE dextroamphetamine-amphetamine (ADDERALL XR) 10 MG 24 hr capsule       1 capsule every afternoon    1 capsule every afternoon    DEXTROAMPHETAMINE-AMPHETAMINE (ADDERALL XR) 30 MG 24  "HR CAPSULE dextroamphetamine-amphetamine (ADDERALL XR) 30 MG 24 hr capsule       Take 1 capsule (30 mg total) by mouth every morning.    Take 1 capsule (30 mg total) by mouth every morning.    DEXTROAMPHETAMINE-AMPHETAMINE (ADDERALL XR) 30 MG 24 HR CAPSULE dextroamphetamine-amphetamine (ADDERALL XR) 30 MG 24 hr capsule       Take 1 capsule (30 mg total) by mouth every morning.    Take 1 capsule (30 mg total) by mouth every morning.    DEXTROAMPHETAMINE-AMPHETAMINE (ADDERALL XR) 30 MG 24 HR CAPSULE dextroamphetamine-amphetamine (ADDERALL XR) 30 MG 24 hr capsule       Take 1 capsule (30 mg total) by mouth every morning.    Take 1 capsule (30 mg total) by mouth every morning.    TELMISARTAN (MICARDIS) 80 MG TAB telmisartan (MICARDIS) 80 MG Tab       Take 1 tablet (80 mg total) by mouth once daily.    Take 1 tablet (80 mg total) by mouth once daily.     Medical, social and surgical history has been reviewed with the patient.     Review of Systems   Musculoskeletal:  Positive for joint pain.      Objective:   BP (!) 160/112 (BP Location: Left arm, Patient Position: Sitting)   Pulse 94   Temp 97.8 °F (36.6 °C) (Temporal)   Resp 18   Ht 5' 9" (1.753 m)   Wt 82.9 kg (182 lb 12.2 oz)   SpO2 99%   BMI 26.99 kg/m²     Physical Exam  Vitals reviewed.   Constitutional:       Appearance: Normal appearance.   HENT:      Head: Normocephalic and atraumatic.   Pulmonary:      Effort: Pulmonary effort is normal.   Skin:     General: Skin is warm and dry.   Neurological:      Mental Status: He is alert and oriented to person, place, and time.       I reviewed and independently interpreted the labs and imaging below:  Last Labs:  Glucose   Date Value Ref Range Status   12/12/2024 85 70 - 110 mg/dL Final   12/14/2023 87 70 - 110 mg/dL Final     BUN   Date Value Ref Range Status   12/12/2024 15 6 - 20 mg/dL Final   12/14/2023 18 6 - 20 mg/dL Final     Creatinine   Date Value Ref Range Status   12/12/2024 1.0 0.5 - 1.4 mg/dL Final "   12/14/2023 0.9 0.5 - 1.4 mg/dL Final     Cholesterol   Date Value Ref Range Status   12/12/2024 250 (H) 120 - 199 mg/dL Final     Comment:     The National Cholesterol Education Program (NCEP) has set the  following guidelines (reference ranges) for Cholesterol:  Optimal.....................<200 mg/dL  Borderline High.............200-239 mg/dL  High........................> or = 240 mg/dL     12/14/2023 156 120 - 199 mg/dL Final     Comment:     The National Cholesterol Education Program (NCEP) has set the  following guidelines (reference ranges) for Cholesterol:  Optimal.....................<200 mg/dL  Borderline High.............200-239 mg/dL  High........................> or = 240 mg/dL       Hemoglobin A1C   Date Value Ref Range Status   12/14/2023 5.0 4.0 - 5.6 % Final     Comment:     ADA Screening Guidelines:  5.7-6.4%  Consistent with prediabetes  >or=6.5%  Consistent with diabetes    High levels of fetal hemoglobin interfere with the HbA1C  assay. Heterozygous hemoglobin variants (HbS, HgC, etc)do  not significantly interfere with this assay.   However, presence of multiple variants may affect accuracy.     11/23/2021 5.4 4.0 - 5.6 % Final     Comment:     ADA Screening Guidelines:  5.7-6.4%  Consistent with prediabetes  >or=6.5%  Consistent with diabetes    High levels of fetal hemoglobin interfere with the HbA1C  assay. Heterozygous hemoglobin variants (HbS, HgC, etc)do  not significantly interfere with this assay.   However, presence of multiple variants may affect accuracy.       Hemoglobin   Date Value Ref Range Status   12/12/2024 13.8 (L) 14.0 - 18.0 g/dL Final   12/14/2023 14.7 14.0 - 18.0 g/dL Final     Hematocrit   Date Value Ref Range Status   12/12/2024 43.0 40.0 - 54.0 % Final   12/14/2023 44.9 40.0 - 54.0 % Final       Assessment and Plan:     1. Attention deficit hyperactivity disorder (ADHD), combined type    Chronic, stable, continue current medications    - dextroamphetamine-amphetamine  (ADDERALL XR) 10 MG 24 hr capsule; 1 capsule every afternoon  Dispense: 30 capsule; Refill: 0  - dextroamphetamine-amphetamine (ADDERALL XR) 10 MG 24 hr capsule; 1 capsule every afternoon  Dispense: 30 capsule; Refill: 0  - dextroamphetamine-amphetamine (ADDERALL XR) 30 MG 24 hr capsule; Take 1 capsule (30 mg total) by mouth every morning.  Dispense: 30 capsule; Refill: 0  - dextroamphetamine-amphetamine (ADDERALL XR) 30 MG 24 hr capsule; Take 1 capsule (30 mg total) by mouth every morning.  Dispense: 30 capsule; Refill: 0  - dextroamphetamine-amphetamine (ADDERALL XR) 10 MG 24 hr capsule; 1 capsule every afternoon  Dispense: 30 capsule; Refill: 0  - dextroamphetamine-amphetamine (ADDERALL XR) 30 MG 24 hr capsule; Take 1 capsule (30 mg total) by mouth every morning.  Dispense: 30 capsule; Refill: 0    2. Primary hypertension    Chronic, stable, continue current medications    - telmisartan (MICARDIS) 80 MG Tab; Take 1 tablet (80 mg total) by mouth once daily.  Dispense: 90 tablet; Refill: 3  - amLODIPine (NORVASC) 2.5 MG tablet; Take 1 tablet (2.5 mg total) by mouth once daily.  Dispense: 90 tablet; Refill: 3

## 2025-06-19 ENCOUNTER — PATIENT MESSAGE (OUTPATIENT)
Dept: INTERNAL MEDICINE | Facility: CLINIC | Age: 60
End: 2025-06-19
Payer: OTHER GOVERNMENT

## 2025-06-25 ENCOUNTER — OFFICE VISIT (OUTPATIENT)
Dept: SPORTS MEDICINE | Facility: CLINIC | Age: 60
End: 2025-06-25
Payer: OTHER GOVERNMENT

## 2025-06-25 VITALS
HEIGHT: 69 IN | SYSTOLIC BLOOD PRESSURE: 159 MMHG | WEIGHT: 182.31 LBS | BODY MASS INDEX: 27 KG/M2 | HEART RATE: 88 BPM | DIASTOLIC BLOOD PRESSURE: 107 MMHG

## 2025-06-25 DIAGNOSIS — G89.29 CHRONIC PAIN OF LEFT KNEE: ICD-10-CM

## 2025-06-25 DIAGNOSIS — M25.562 CHRONIC PAIN OF LEFT KNEE: ICD-10-CM

## 2025-06-25 DIAGNOSIS — M17.12 PRIMARY OSTEOARTHRITIS OF LEFT KNEE: Primary | ICD-10-CM

## 2025-06-25 PROCEDURE — 99999PBSHW PR PBB SHADOW TECHNICAL ONLY FILED TO HB: Mod: PBBFAC,,,

## 2025-06-25 PROCEDURE — 20611 DRAIN/INJ JOINT/BURSA W/US: CPT | Mod: PBBFAC | Performed by: FAMILY MEDICINE

## 2025-06-25 PROCEDURE — 99999 PR PBB SHADOW E&M-EST. PATIENT-LVL III: CPT | Mod: PBBFAC,,, | Performed by: FAMILY MEDICINE

## 2025-06-25 PROCEDURE — 99213 OFFICE O/P EST LOW 20 MIN: CPT | Mod: PBBFAC | Performed by: FAMILY MEDICINE

## 2025-06-25 RX ORDER — TRIAMCINOLONE ACETONIDE 40 MG/ML
40 INJECTION, SUSPENSION INTRA-ARTICULAR; INTRAMUSCULAR
Status: DISCONTINUED | OUTPATIENT
Start: 2025-06-25 | End: 2025-06-25 | Stop reason: HOSPADM

## 2025-06-25 RX ADMIN — Medication 48 MG: at 11:06

## 2025-06-25 RX ADMIN — TRIAMCINOLONE ACETONIDE 40 MG: 40 INJECTION, SUSPENSION INTRA-ARTICULAR; INTRAMUSCULAR at 11:06

## 2025-06-25 NOTE — PROGRESS NOTES
Alicia knee joint left 1/1  Lot number: [id number]  Expiration date: 2028-02    MEDICAL NECESSITY FOR VISCOSUPPLEMENTATION USE: After thorough evaluation of the patient, I have determined that viscosupplementation treatment is medically necessary. 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 Oral analgesics including NSAIDs have not adequately controlled the patient's symptoms. There is radiographic evidence of Kellgren-Rajiv grade II (or greater) osteoarthritic (OA) changes, or if lack of radiographic evidence, there is arthroscopic or other evidence of chondrosis of the knee(s).

## 2025-06-25 NOTE — PROCEDURES
"Large Joint Aspiration/Injection: L knee    Date/Time: 6/25/2025 11:00 AM    Performed by: Michael Hinds MD  Authorized by: Michael Hinds MD    Consent Done?:  Yes (Verbal)  Indications:  Pain  Site marked: the procedure site was marked    Timeout: prior to procedure the correct patient, procedure, and site was verified    Prep: patient was prepped and draped in usual sterile fashion      Details:  Needle Size:  25 G  Ultrasonic Guidance for needle placement?: Yes    Images are saved and documented.  Approach:  Lateral  Location:  Knee  Site:  L knee  Medications:  40 mg triamcinolone acetonide 40 mg/mL; 48 mg hylan g-f 20 48 mg/6 mL  Patient tolerance:  Patient tolerated the procedure well with no immediate complications     Description of ultrasound utilization for needle guidance:   Ultrasound guidance used for needle localization. Images saved and stored for documentation. The SUPRAPATELLAR BURSA / KNEE JOINT was visualized. Dynamic visualization of the 25g x 1.5" needle was continuous throughout the procedure.     "

## 2025-07-29 ENCOUNTER — PATIENT MESSAGE (OUTPATIENT)
Dept: INTERNAL MEDICINE | Facility: CLINIC | Age: 60
End: 2025-07-29
Payer: OTHER GOVERNMENT

## 2025-07-29 NOTE — TELEPHONE ENCOUNTER
Spoke with pharmacy, pt has a rx that was written on 06/13/25 due for future ----- 08/13/25 and 09/13/25. Rx for 09/13/25 will no longer work because it was filled 2 far out. Pt next available would be 08/13/25. Pt stating he needs meds now. Therefore pt will need completely new scripts sent to [facility] .

## 2025-07-29 NOTE — TELEPHONE ENCOUNTER
Gamaliel Burnham    Reviewed request w/ PCP, Dr. Solomon  Pt needs appt w/ him to review meds and recheck elevated BP

## (undated) DEVICE — DRESSING AQUACEL FOAM 5 X 5

## (undated) DEVICE — KIT SURGIFLO HEMOSTATIC MATRIX

## (undated) DEVICE — SUT STRATAFIX 3-0 30CM

## (undated) DEVICE — SUT VICRYL PLUS 0 CT1 18IN

## (undated) DEVICE — CATH BIDIRECTIONAL DF CRV 7FR

## (undated) DEVICE — CORD FOR BIPOLAR FORCEPS 12

## (undated) DEVICE — SEE MEDLINE ITEM 156905

## (undated) DEVICE — GLOVE GAMMEX SURG LF PI SZ 7.5

## (undated) DEVICE — NDL SPINAL 18GX3.5 SPINOCAN

## (undated) DEVICE — DRESSING SURGICAL 1/2X1/2

## (undated) DEVICE — DRAPE C-ARMOR EQUIPMENT COVER

## (undated) DEVICE — GAUZE SPONGE 4X4 12PLY

## (undated) DEVICE — CATH SAFIRE 7FR 4CC LG SWEEP

## (undated) DEVICE — INTRO 8.5FR 63CM SRO

## (undated) DEVICE — PAD GROUND UNIV STYLE CORD 9IN

## (undated) DEVICE — INTRODUCER HEMOSTASIS 7.5F

## (undated) DEVICE — DRESSING ABSRBNT ISLAND 3.6X8

## (undated) DEVICE — CATH SUPREME QPLR CRD-2 6F 120

## (undated) DEVICE — PAD DEFIB CADENCE ADULT R2

## (undated) DEVICE — PACK EP DRAPE

## (undated) DEVICE — KIT PROBE COVER WITH GEL

## (undated) DEVICE — BUR BONE CUT MICRO TPS 3X3.8MM

## (undated) DEVICE — DRAPE LAP T SHT W/ INSTR PAD

## (undated) DEVICE — CATH RESPONSE QPLR JSN 6F 120

## (undated) DEVICE — DRAPE C-ARM ELAS CLIP 42X120IN

## (undated) DEVICE — CARTRIDGE OIL

## (undated) DEVICE — SUT VICRYL PLUS 2-0

## (undated) DEVICE — DIFFUSER

## (undated) DEVICE — BLADE 4IN EDGE INSULATED

## (undated) DEVICE — KIT ENSITE ELECTRODE SURFACE

## (undated) DEVICE — DRAPE STERI-DRAPE 1000 17X11IN

## (undated) DEVICE — DRESSING AQUACEL SACRAL 9 X 9

## (undated) DEVICE — Device

## (undated) DEVICE — GLOVE BIOGEL SKINSENSE PI 7.0

## (undated) DEVICE — APPLICATOR CHLORAPREP ORN 26ML

## (undated) DEVICE — ADHESIVE DERMABOND ADVANCED

## (undated) DEVICE — DRAPE TOP 53X102IN

## (undated) DEVICE — ELECTRODE REM PLYHSV RETURN 9